# Patient Record
Sex: FEMALE | Race: WHITE | NOT HISPANIC OR LATINO | Employment: PART TIME | URBAN - METROPOLITAN AREA
[De-identification: names, ages, dates, MRNs, and addresses within clinical notes are randomized per-mention and may not be internally consistent; named-entity substitution may affect disease eponyms.]

---

## 2018-09-17 ENCOUNTER — TRANSCRIBE ORDERS (OUTPATIENT)
Dept: ADMINISTRATIVE | Facility: HOSPITAL | Age: 53
End: 2018-09-17

## 2018-09-17 ENCOUNTER — HOSPITAL ENCOUNTER (OUTPATIENT)
Dept: RADIOLOGY | Facility: HOSPITAL | Age: 53
Discharge: HOME | End: 2018-09-17
Payer: COMMERCIAL

## 2018-09-17 DIAGNOSIS — M54.2 NECK PAIN ON RIGHT SIDE: ICD-10-CM

## 2018-09-17 DIAGNOSIS — R07.89 STERNOCOSTAL PAIN: ICD-10-CM

## 2018-09-17 DIAGNOSIS — M54.2 NECK PAIN ON RIGHT SIDE: Primary | ICD-10-CM

## 2018-09-17 PROCEDURE — 71130 X-RAY STRENOCLAVIC JT 3/>VWS: CPT

## 2018-09-17 PROCEDURE — 72050 X-RAY EXAM NECK SPINE 4/5VWS: CPT

## 2018-09-21 ENCOUNTER — TRANSCRIBE ORDERS (OUTPATIENT)
Dept: ADMINISTRATIVE | Facility: HOSPITAL | Age: 53
End: 2018-09-21

## 2018-09-21 DIAGNOSIS — M89.8X1 CLAVICLE PAIN: Primary | ICD-10-CM

## 2018-09-27 ENCOUNTER — HOSPITAL ENCOUNTER (OUTPATIENT)
Dept: CT IMAGING | Facility: HOSPITAL | Age: 53
Discharge: HOME | End: 2018-09-27
Payer: COMMERCIAL

## 2018-09-27 DIAGNOSIS — M89.8X1 CLAVICLE PAIN: ICD-10-CM

## 2018-09-27 PROCEDURE — 73200 CT UPPER EXTREMITY W/O DYE: CPT

## 2019-05-27 ENCOUNTER — OFFICE VISIT (OUTPATIENT)
Dept: URGENT CARE | Facility: CLINIC | Age: 54
End: 2019-05-27
Payer: COMMERCIAL

## 2019-05-27 VITALS
HEIGHT: 64 IN | TEMPERATURE: 97.6 F | SYSTOLIC BLOOD PRESSURE: 153 MMHG | HEART RATE: 82 BPM | RESPIRATION RATE: 16 BRPM | DIASTOLIC BLOOD PRESSURE: 75 MMHG | WEIGHT: 155 LBS | OXYGEN SATURATION: 98 % | BODY MASS INDEX: 26.46 KG/M2

## 2019-05-27 DIAGNOSIS — M62.838 TRAPEZIUS MUSCLE SPASM: Primary | ICD-10-CM

## 2019-05-27 PROCEDURE — 99204 OFFICE O/P NEW MOD 45 MIN: CPT | Performed by: NURSE PRACTITIONER

## 2019-05-27 PROCEDURE — S9088 SERVICES PROVIDED IN URGENT: HCPCS | Performed by: NURSE PRACTITIONER

## 2019-05-27 RX ORDER — BACLOFEN 10 MG/1
10 TABLET ORAL 3 TIMES DAILY PRN
Qty: 30 TABLET | Refills: 0 | Status: SHIPPED | OUTPATIENT
Start: 2019-05-27 | End: 2022-07-12

## 2019-05-27 RX ORDER — LEVOTHYROXINE SODIUM 0.1 MG/1
TABLET ORAL
COMMUNITY
Start: 2011-11-23 | End: 2022-07-12

## 2019-05-27 RX ORDER — MELOXICAM 7.5 MG/1
7.5 TABLET ORAL 2 TIMES DAILY
COMMUNITY
End: 2022-07-12

## 2019-05-27 RX ORDER — DOXYCYCLINE HYCLATE 50 MG/1
324 CAPSULE, GELATIN COATED ORAL
COMMUNITY
End: 2022-07-12

## 2019-06-11 ENCOUNTER — EVALUATION (OUTPATIENT)
Dept: PHYSICAL THERAPY | Facility: CLINIC | Age: 54
End: 2019-06-11
Payer: COMMERCIAL

## 2019-06-11 VITALS — SYSTOLIC BLOOD PRESSURE: 134 MMHG | HEART RATE: 64 BPM | DIASTOLIC BLOOD PRESSURE: 87 MMHG

## 2019-06-11 DIAGNOSIS — R29.3 POSTURE ABNORMALITY: ICD-10-CM

## 2019-06-11 DIAGNOSIS — M62.838 TRAPEZIUS MUSCLE SPASM: Primary | ICD-10-CM

## 2019-06-11 PROCEDURE — 97162 PT EVAL MOD COMPLEX 30 MIN: CPT | Performed by: PHYSICAL THERAPIST

## 2019-06-11 PROCEDURE — 97535 SELF CARE MNGMENT TRAINING: CPT | Performed by: PHYSICAL THERAPIST

## 2019-06-14 ENCOUNTER — OFFICE VISIT (OUTPATIENT)
Dept: PHYSICAL THERAPY | Facility: CLINIC | Age: 54
End: 2019-06-14
Payer: COMMERCIAL

## 2019-06-14 DIAGNOSIS — M62.838 TRAPEZIUS MUSCLE SPASM: Primary | ICD-10-CM

## 2019-06-14 DIAGNOSIS — R29.3 POSTURE ABNORMALITY: ICD-10-CM

## 2019-06-14 PROCEDURE — 97110 THERAPEUTIC EXERCISES: CPT | Performed by: PHYSICAL THERAPIST

## 2019-06-14 PROCEDURE — 97140 MANUAL THERAPY 1/> REGIONS: CPT | Performed by: PHYSICAL THERAPIST

## 2019-06-17 ENCOUNTER — OFFICE VISIT (OUTPATIENT)
Dept: PHYSICAL THERAPY | Facility: CLINIC | Age: 54
End: 2019-06-17
Payer: COMMERCIAL

## 2019-06-17 DIAGNOSIS — M62.838 TRAPEZIUS MUSCLE SPASM: Primary | ICD-10-CM

## 2019-06-17 DIAGNOSIS — R29.3 POSTURE ABNORMALITY: ICD-10-CM

## 2019-06-17 PROCEDURE — 97110 THERAPEUTIC EXERCISES: CPT | Performed by: PHYSICAL THERAPIST

## 2019-06-17 PROCEDURE — 97140 MANUAL THERAPY 1/> REGIONS: CPT | Performed by: PHYSICAL THERAPIST

## 2019-06-19 ENCOUNTER — OFFICE VISIT (OUTPATIENT)
Dept: PHYSICAL THERAPY | Facility: CLINIC | Age: 54
End: 2019-06-19
Payer: COMMERCIAL

## 2019-06-19 DIAGNOSIS — M62.838 TRAPEZIUS MUSCLE SPASM: Primary | ICD-10-CM

## 2019-06-19 DIAGNOSIS — R29.3 POSTURE ABNORMALITY: ICD-10-CM

## 2019-06-19 PROCEDURE — 97110 THERAPEUTIC EXERCISES: CPT | Performed by: PHYSICAL THERAPIST

## 2019-06-19 PROCEDURE — 97140 MANUAL THERAPY 1/> REGIONS: CPT | Performed by: PHYSICAL THERAPIST

## 2019-06-24 ENCOUNTER — APPOINTMENT (OUTPATIENT)
Dept: PHYSICAL THERAPY | Facility: CLINIC | Age: 54
End: 2019-06-24
Payer: COMMERCIAL

## 2019-06-27 ENCOUNTER — OFFICE VISIT (OUTPATIENT)
Dept: PHYSICAL THERAPY | Facility: CLINIC | Age: 54
End: 2019-06-27
Payer: COMMERCIAL

## 2019-06-27 DIAGNOSIS — M62.838 TRAPEZIUS MUSCLE SPASM: Primary | ICD-10-CM

## 2019-06-27 DIAGNOSIS — R29.3 POSTURE ABNORMALITY: ICD-10-CM

## 2019-06-27 PROCEDURE — 97140 MANUAL THERAPY 1/> REGIONS: CPT | Performed by: PHYSICAL THERAPIST

## 2019-06-27 PROCEDURE — 97110 THERAPEUTIC EXERCISES: CPT | Performed by: PHYSICAL THERAPIST

## 2019-08-21 ENCOUNTER — APPOINTMENT (OUTPATIENT)
Dept: LAB | Facility: HOSPITAL | Age: 54
End: 2019-08-21
Payer: COMMERCIAL

## 2019-08-21 ENCOUNTER — TRANSCRIBE ORDERS (OUTPATIENT)
Dept: ADMINISTRATIVE | Facility: HOSPITAL | Age: 54
End: 2019-08-21

## 2019-08-21 DIAGNOSIS — Z98.84 S/P BARIATRIC SURGERY: ICD-10-CM

## 2019-08-21 DIAGNOSIS — E03.9 HYPOTHYROIDISM, UNSPECIFIED TYPE: Primary | ICD-10-CM

## 2019-08-21 DIAGNOSIS — E03.9 HYPOTHYROIDISM, UNSPECIFIED TYPE: ICD-10-CM

## 2019-08-21 DIAGNOSIS — Z98.84 S/P BARIATRIC SURGERY: Primary | ICD-10-CM

## 2019-08-21 LAB
25(OH)D3 SERPL-MCNC: 27.2 NG/ML (ref 30–100)
ALBUMIN SERPL BCP-MCNC: 4.2 G/DL (ref 3.5–5)
ALP SERPL-CCNC: 105 U/L (ref 46–116)
ALT SERPL W P-5'-P-CCNC: 40 U/L (ref 12–78)
ANION GAP SERPL CALCULATED.3IONS-SCNC: 7 MMOL/L (ref 4–13)
AST SERPL W P-5'-P-CCNC: 24 U/L (ref 5–45)
BASOPHILS # BLD AUTO: 0.05 THOUSANDS/ΜL (ref 0–0.1)
BASOPHILS NFR BLD AUTO: 1 % (ref 0–1)
BILIRUB SERPL-MCNC: 0.42 MG/DL (ref 0.2–1)
BUN SERPL-MCNC: 12 MG/DL (ref 5–25)
CALCIUM SERPL-MCNC: 9.5 MG/DL (ref 8.3–10.1)
CHLORIDE SERPL-SCNC: 108 MMOL/L (ref 100–108)
CHOLEST SERPL-MCNC: 251 MG/DL (ref 50–200)
CO2 SERPL-SCNC: 29 MMOL/L (ref 21–32)
CREAT SERPL-MCNC: 0.85 MG/DL (ref 0.6–1.3)
EOSINOPHIL # BLD AUTO: 0.48 THOUSAND/ΜL (ref 0–0.61)
EOSINOPHIL NFR BLD AUTO: 7 % (ref 0–6)
ERYTHROCYTE [DISTWIDTH] IN BLOOD BY AUTOMATED COUNT: 12.5 % (ref 11.6–15.1)
FERRITIN SERPL-MCNC: 692 NG/ML (ref 8–388)
FOLATE SERPL-MCNC: >20 NG/ML (ref 3.1–17.5)
GFR SERPL CREATININE-BSD FRML MDRD: 78 ML/MIN/1.73SQ M
GLUCOSE P FAST SERPL-MCNC: 90 MG/DL (ref 65–99)
HCT VFR BLD AUTO: 42.7 % (ref 34.8–46.1)
HDLC SERPL-MCNC: 55 MG/DL (ref 40–60)
HGB BLD-MCNC: 14.2 G/DL (ref 11.5–15.4)
IMM GRANULOCYTES # BLD AUTO: 0.02 THOUSAND/UL (ref 0–0.2)
IMM GRANULOCYTES NFR BLD AUTO: 0 % (ref 0–2)
IRON SERPL-MCNC: 96 UG/DL (ref 50–170)
LDLC SERPL CALC-MCNC: 162 MG/DL (ref 0–100)
LYMPHOCYTES # BLD AUTO: 2.42 THOUSANDS/ΜL (ref 0.6–4.47)
LYMPHOCYTES NFR BLD AUTO: 33 % (ref 14–44)
MCH RBC QN AUTO: 31.8 PG (ref 26.8–34.3)
MCHC RBC AUTO-ENTMCNC: 33.3 G/DL (ref 31.4–37.4)
MCV RBC AUTO: 96 FL (ref 82–98)
MONOCYTES # BLD AUTO: 0.44 THOUSAND/ΜL (ref 0.17–1.22)
MONOCYTES NFR BLD AUTO: 6 % (ref 4–12)
NEUTROPHILS # BLD AUTO: 3.84 THOUSANDS/ΜL (ref 1.85–7.62)
NEUTS SEG NFR BLD AUTO: 53 % (ref 43–75)
NONHDLC SERPL-MCNC: 196 MG/DL
NRBC BLD AUTO-RTO: 0 /100 WBCS
PLATELET # BLD AUTO: 266 THOUSANDS/UL (ref 149–390)
PMV BLD AUTO: 9.9 FL (ref 8.9–12.7)
POTASSIUM SERPL-SCNC: 4 MMOL/L (ref 3.5–5.3)
PROT SERPL-MCNC: 7.3 G/DL (ref 6.4–8.2)
PTH-INTACT SERPL-MCNC: 38 PG/ML (ref 18.4–80.1)
RBC # BLD AUTO: 4.46 MILLION/UL (ref 3.81–5.12)
SODIUM SERPL-SCNC: 144 MMOL/L (ref 136–145)
T4 FREE SERPL-MCNC: 1.16 NG/DL (ref 0.76–1.46)
TRIGL SERPL-MCNC: 169 MG/DL
TSH SERPL DL<=0.05 MIU/L-ACNC: 0.15 UIU/ML (ref 0.36–3.74)
VIT B12 SERPL-MCNC: 2268 PG/ML (ref 100–900)
WBC # BLD AUTO: 7.25 THOUSAND/UL (ref 4.31–10.16)

## 2019-08-21 PROCEDURE — 85025 COMPLETE CBC W/AUTO DIFF WBC: CPT

## 2019-08-21 PROCEDURE — 80053 COMPREHEN METABOLIC PANEL: CPT

## 2019-08-21 PROCEDURE — 84425 ASSAY OF VITAMIN B-1: CPT

## 2019-08-21 PROCEDURE — 84630 ASSAY OF ZINC: CPT

## 2019-08-21 PROCEDURE — 83970 ASSAY OF PARATHORMONE: CPT

## 2019-08-21 PROCEDURE — 84439 ASSAY OF FREE THYROXINE: CPT

## 2019-08-21 PROCEDURE — 84590 ASSAY OF VITAMIN A: CPT

## 2019-08-21 PROCEDURE — 82306 VITAMIN D 25 HYDROXY: CPT

## 2019-08-21 PROCEDURE — 36415 COLL VENOUS BLD VENIPUNCTURE: CPT

## 2019-08-21 PROCEDURE — 84443 ASSAY THYROID STIM HORMONE: CPT

## 2019-08-21 PROCEDURE — 83540 ASSAY OF IRON: CPT

## 2019-08-21 PROCEDURE — 80061 LIPID PANEL: CPT

## 2019-08-21 PROCEDURE — 82728 ASSAY OF FERRITIN: CPT

## 2019-08-21 PROCEDURE — 82607 VITAMIN B-12: CPT

## 2019-08-21 PROCEDURE — 82746 ASSAY OF FOLIC ACID SERUM: CPT

## 2019-08-24 LAB — ZINC SERPL-MCNC: 86 UG/DL (ref 56–134)

## 2019-08-25 LAB — VIT B1 BLD-SCNC: 195.3 NMOL/L (ref 66.5–200)

## 2019-08-26 LAB — VIT A SERPL-MCNC: 69.5 UG/DL (ref 20.1–62)

## 2019-09-26 ENCOUNTER — TRANSCRIBE ORDERS (OUTPATIENT)
Dept: ADMINISTRATIVE | Facility: HOSPITAL | Age: 54
End: 2019-09-26

## 2019-09-26 DIAGNOSIS — Z12.39 BREAST SCREENING, UNSPECIFIED: Primary | ICD-10-CM

## 2019-09-30 ENCOUNTER — HOSPITAL ENCOUNTER (OUTPATIENT)
Dept: MAMMOGRAPHY | Facility: HOSPITAL | Age: 54
Discharge: HOME | End: 2019-09-30
Attending: SPECIALIST
Payer: COMMERCIAL

## 2019-09-30 VITALS — BODY MASS INDEX: 27.31 KG/M2 | WEIGHT: 160 LBS | HEIGHT: 64 IN

## 2019-09-30 DIAGNOSIS — Z12.39 BREAST SCREENING, UNSPECIFIED: ICD-10-CM

## 2019-09-30 PROCEDURE — 77063 BREAST TOMOSYNTHESIS BI: CPT

## 2019-09-30 PROCEDURE — 77067 SCR MAMMO BI INCL CAD: CPT

## 2020-09-25 ENCOUNTER — TRANSCRIBE ORDERS (OUTPATIENT)
Dept: URGENT CARE | Facility: CLINIC | Age: 55
End: 2020-09-25

## 2020-09-25 ENCOUNTER — LAB (OUTPATIENT)
Dept: LAB | Facility: CLINIC | Age: 55
End: 2020-09-25
Payer: COMMERCIAL

## 2020-09-25 DIAGNOSIS — E03.9 HYPOTHYROIDISM, UNSPECIFIED TYPE: ICD-10-CM

## 2020-09-25 DIAGNOSIS — Z98.84 BARIATRIC SURGERY STATUS: ICD-10-CM

## 2020-09-25 DIAGNOSIS — Z98.84 BARIATRIC SURGERY STATUS: Primary | ICD-10-CM

## 2020-09-25 LAB
25(OH)D3 SERPL-MCNC: 30.2 NG/ML (ref 30–100)
ALBUMIN SERPL BCP-MCNC: 4.1 G/DL (ref 3.5–5)
ALP SERPL-CCNC: 108 U/L (ref 46–116)
ALT SERPL W P-5'-P-CCNC: 109 U/L (ref 12–78)
ANION GAP SERPL CALCULATED.3IONS-SCNC: 4 MMOL/L (ref 4–13)
AST SERPL W P-5'-P-CCNC: 60 U/L (ref 5–45)
BASOPHILS # BLD AUTO: 0.07 THOUSANDS/ΜL (ref 0–0.1)
BASOPHILS NFR BLD AUTO: 1 % (ref 0–1)
BILIRUB SERPL-MCNC: 0.38 MG/DL (ref 0.2–1)
BUN SERPL-MCNC: 13 MG/DL (ref 5–25)
CALCIUM SERPL-MCNC: 9.2 MG/DL (ref 8.3–10.1)
CHLORIDE SERPL-SCNC: 108 MMOL/L (ref 100–108)
CHOLEST SERPL-MCNC: 213 MG/DL (ref 50–200)
CO2 SERPL-SCNC: 28 MMOL/L (ref 21–32)
CREAT SERPL-MCNC: 0.91 MG/DL (ref 0.6–1.3)
EOSINOPHIL # BLD AUTO: 0.55 THOUSAND/ΜL (ref 0–0.61)
EOSINOPHIL NFR BLD AUTO: 8 % (ref 0–6)
ERYTHROCYTE [DISTWIDTH] IN BLOOD BY AUTOMATED COUNT: 12.4 % (ref 11.6–15.1)
FERRITIN SERPL-MCNC: 817 NG/ML (ref 8–388)
FOLATE SERPL-MCNC: >20 NG/ML (ref 3.1–17.5)
GFR SERPL CREATININE-BSD FRML MDRD: 72 ML/MIN/1.73SQ M
GLUCOSE P FAST SERPL-MCNC: 91 MG/DL (ref 65–99)
HCT VFR BLD AUTO: 42.6 % (ref 34.8–46.1)
HDLC SERPL-MCNC: 52 MG/DL
HGB BLD-MCNC: 14.1 G/DL (ref 11.5–15.4)
IMM GRANULOCYTES # BLD AUTO: 0.01 THOUSAND/UL (ref 0–0.2)
IMM GRANULOCYTES NFR BLD AUTO: 0 % (ref 0–2)
IRON SERPL-MCNC: 70 UG/DL (ref 50–170)
LDLC SERPL CALC-MCNC: 139 MG/DL (ref 0–100)
LYMPHOCYTES # BLD AUTO: 2 THOUSANDS/ΜL (ref 0.6–4.47)
LYMPHOCYTES NFR BLD AUTO: 30 % (ref 14–44)
MCH RBC QN AUTO: 31.9 PG (ref 26.8–34.3)
MCHC RBC AUTO-ENTMCNC: 33.1 G/DL (ref 31.4–37.4)
MCV RBC AUTO: 96 FL (ref 82–98)
MONOCYTES # BLD AUTO: 0.52 THOUSAND/ΜL (ref 0.17–1.22)
MONOCYTES NFR BLD AUTO: 8 % (ref 4–12)
NEUTROPHILS # BLD AUTO: 3.47 THOUSANDS/ΜL (ref 1.85–7.62)
NEUTS SEG NFR BLD AUTO: 53 % (ref 43–75)
NONHDLC SERPL-MCNC: 161 MG/DL
NRBC BLD AUTO-RTO: 0 /100 WBCS
PLATELET # BLD AUTO: 278 THOUSANDS/UL (ref 149–390)
PMV BLD AUTO: 9.5 FL (ref 8.9–12.7)
POTASSIUM SERPL-SCNC: 3.9 MMOL/L (ref 3.5–5.3)
PROT SERPL-MCNC: 7.4 G/DL (ref 6.4–8.2)
PTH-INTACT SERPL-MCNC: 79.9 PG/ML (ref 18.4–80.1)
RBC # BLD AUTO: 4.42 MILLION/UL (ref 3.81–5.12)
SODIUM SERPL-SCNC: 140 MMOL/L (ref 136–145)
T4 FREE SERPL-MCNC: 0.93 NG/DL (ref 0.76–1.46)
TRIGL SERPL-MCNC: 110 MG/DL
TSH SERPL DL<=0.05 MIU/L-ACNC: 2.14 UIU/ML (ref 0.36–3.74)
VIT B12 SERPL-MCNC: 440 PG/ML (ref 100–900)
WBC # BLD AUTO: 6.62 THOUSAND/UL (ref 4.31–10.16)

## 2020-09-25 PROCEDURE — 84443 ASSAY THYROID STIM HORMONE: CPT

## 2020-09-25 PROCEDURE — 85025 COMPLETE CBC W/AUTO DIFF WBC: CPT

## 2020-09-25 PROCEDURE — 36415 COLL VENOUS BLD VENIPUNCTURE: CPT

## 2020-09-25 PROCEDURE — 83970 ASSAY OF PARATHORMONE: CPT

## 2020-09-25 PROCEDURE — 80053 COMPREHEN METABOLIC PANEL: CPT

## 2020-09-25 PROCEDURE — 82746 ASSAY OF FOLIC ACID SERUM: CPT

## 2020-09-25 PROCEDURE — 84630 ASSAY OF ZINC: CPT

## 2020-09-25 PROCEDURE — 82728 ASSAY OF FERRITIN: CPT

## 2020-09-25 PROCEDURE — 84439 ASSAY OF FREE THYROXINE: CPT

## 2020-09-25 PROCEDURE — 84590 ASSAY OF VITAMIN A: CPT

## 2020-09-25 PROCEDURE — 80061 LIPID PANEL: CPT

## 2020-09-25 PROCEDURE — 83540 ASSAY OF IRON: CPT

## 2020-09-25 PROCEDURE — 82607 VITAMIN B-12: CPT

## 2020-09-25 PROCEDURE — 82306 VITAMIN D 25 HYDROXY: CPT

## 2020-09-25 PROCEDURE — 84425 ASSAY OF VITAMIN B-1: CPT

## 2020-09-28 LAB — VIT A SERPL-MCNC: 53.4 UG/DL (ref 20.1–62)

## 2020-09-29 LAB — ZINC SERPL-MCNC: 96 UG/DL (ref 56–134)

## 2020-09-30 LAB — VIT B1 BLD-SCNC: 121.7 NMOL/L (ref 66.5–200)

## 2022-04-05 ENCOUNTER — TELEPHONE (OUTPATIENT)
Dept: BARIATRICS | Facility: CLINIC | Age: 57
End: 2022-04-05

## 2022-04-05 NOTE — TELEPHONE ENCOUNTER
Transfer patient who had the RNY on 3/14/2003 with Dr Kenny Cornea  Do not need the operative note due to the surgery being performed more than 10 years ago  Still need patients PCP records  Gave patient our fax number  for her to have her records faxed over to us  Patient stated she will call us back to give us the name of where the surgery was performed  Placed paper work in the [address] [place] [address] under the letter A

## 2022-07-05 ENCOUNTER — TELEPHONE (OUTPATIENT)
Dept: GASTROENTEROLOGY | Facility: CLINIC | Age: 57
End: 2022-07-05

## 2022-07-05 DIAGNOSIS — E78.5 HYPERLIPIDEMIA, UNSPECIFIED HYPERLIPIDEMIA TYPE: Primary | ICD-10-CM

## 2022-07-05 DIAGNOSIS — E55.9 VITAMIN D DEFICIENCY: ICD-10-CM

## 2022-07-05 DIAGNOSIS — E03.9 HYPOTHYROIDISM, UNSPECIFIED TYPE: ICD-10-CM

## 2022-07-05 NOTE — TELEPHONE ENCOUNTER
Patient changing over from [facility]  Wants to know if you can order blood work for her for an upcoming appointment on 08/11/2022  Thank you

## 2022-07-12 ENCOUNTER — OFFICE VISIT (OUTPATIENT)
Dept: FAMILY MEDICINE CLINIC | Facility: CLINIC | Age: 57
End: 2022-07-12
Payer: COMMERCIAL

## 2022-07-12 VITALS
OXYGEN SATURATION: 98 % | HEART RATE: 88 BPM | HEIGHT: 64 IN | WEIGHT: 176.2 LBS | RESPIRATION RATE: 16 BRPM | DIASTOLIC BLOOD PRESSURE: 80 MMHG | BODY MASS INDEX: 30.08 KG/M2 | SYSTOLIC BLOOD PRESSURE: 136 MMHG | TEMPERATURE: 97.5 F

## 2022-07-12 DIAGNOSIS — Z78.0 ASYMPTOMATIC MENOPAUSE: ICD-10-CM

## 2022-07-12 DIAGNOSIS — J45.21 MILD INTERMITTENT ASTHMA WITH ACUTE EXACERBATION: ICD-10-CM

## 2022-07-12 DIAGNOSIS — Z12.12 ENCOUNTER FOR COLORECTAL CANCER SCREENING: ICD-10-CM

## 2022-07-12 DIAGNOSIS — E66.09 CLASS 1 OBESITY DUE TO EXCESS CALORIES WITH SERIOUS COMORBIDITY AND BODY MASS INDEX (BMI) OF 30.0 TO 30.9 IN ADULT: ICD-10-CM

## 2022-07-12 DIAGNOSIS — Z12.31 ENCOUNTER FOR SCREENING MAMMOGRAM FOR BREAST CANCER: ICD-10-CM

## 2022-07-12 DIAGNOSIS — Z12.11 ENCOUNTER FOR COLORECTAL CANCER SCREENING: ICD-10-CM

## 2022-07-12 DIAGNOSIS — E03.8 OTHER SPECIFIED HYPOTHYROIDISM: ICD-10-CM

## 2022-07-12 DIAGNOSIS — J20.8 ACUTE BRONCHITIS DUE TO OTHER SPECIFIED ORGANISMS: Primary | ICD-10-CM

## 2022-07-12 PROBLEM — E66.811 CLASS 1 OBESITY DUE TO EXCESS CALORIES WITH SERIOUS COMORBIDITY AND BODY MASS INDEX (BMI) OF 30.0 TO 30.9 IN ADULT: Status: ACTIVE | Noted: 2022-07-12

## 2022-07-12 PROBLEM — R06.83 SNORING: Status: ACTIVE | Noted: 2021-10-26

## 2022-07-12 PROBLEM — Z98.84 S/P BARIATRIC SURGERY: Status: ACTIVE | Noted: 2019-08-05

## 2022-07-12 PROBLEM — E55.9 VITAMIN D DEFICIENCY: Status: ACTIVE | Noted: 2017-01-06

## 2022-07-12 PROCEDURE — 99204 OFFICE O/P NEW MOD 45 MIN: CPT | Performed by: FAMILY MEDICINE

## 2022-07-12 PROCEDURE — 3725F SCREEN DEPRESSION PERFORMED: CPT | Performed by: FAMILY MEDICINE

## 2022-07-12 RX ORDER — ALBUTEROL SULFATE 90 UG/1
2 AEROSOL, METERED RESPIRATORY (INHALATION) EVERY 6 HOURS PRN
Qty: 18 G | Refills: 2 | Status: SHIPPED | OUTPATIENT
Start: 2022-07-12

## 2022-07-12 RX ORDER — AZITHROMYCIN 250 MG/1
TABLET, FILM COATED ORAL
Qty: 6 TABLET | Refills: 0 | Status: SHIPPED | OUTPATIENT
Start: 2022-07-12 | End: 2022-07-16

## 2022-07-12 RX ORDER — PREDNISONE 50 MG/1
50 TABLET ORAL DAILY
Qty: 5 TABLET | Refills: 0 | Status: SHIPPED | OUTPATIENT
Start: 2022-07-12 | End: 2022-07-17

## 2022-07-12 RX ORDER — LEVOTHYROXINE SODIUM 88 UG/1
TABLET ORAL
COMMUNITY
Start: 2022-05-09 | End: 2022-09-29 | Stop reason: ALTCHOICE

## 2022-07-12 RX ORDER — ESCITALOPRAM OXALATE 10 MG/1
10 TABLET ORAL DAILY
COMMUNITY
Start: 2022-05-31

## 2022-07-12 NOTE — PROGRESS NOTES
Assessment/Plan:  Hypothyroidism  Continue same  I will check her blood work and she is to follow up with endocrinologist     Acute bronchitis due to other specified organisms  She was given prescriptions for Z-Lizandro, prednisone and albuterol inhaler  Discussed about increasing oral hydration humidifier at home  If symptoms are not improving call or come back  Mild intermittent asthma with acute exacerbation  She was given prescriptions for prednisone and albuterol inhaler  She was told to come back in 3-4 weeks  I will consider checking chest x-ray and pulmonary function test when she is better  Class 1 obesity due to excess calories with serious comorbidity and body mass index (BMI) of 30 0 to 30 9 in adult  Discussed about low carb diet and regular exercise  Discussed about treatment options including medications  Discussed about phentermine  Come back in 3-4 weeks and get blood work before she comes back  Diagnoses and all orders for this visit:    Acute bronchitis due to other specified organisms  -     predniSONE 50 mg tablet; Take 1 tablet (50 mg total) by mouth daily for 5 days  -     azithromycin (ZITHROMAX) 250 mg tablet; Take 2 tablets today then 1 tablet daily x 4 days  -     albuterol (ProAir HFA) 90 mcg/act inhaler; Inhale 2 puffs every 6 (six) hours as needed for wheezing    Other specified hypothyroidism  -     CBC and differential; Future    Encounter for screening mammogram for breast cancer  -     Mammo screening bilateral w 3d & cad; Future    Encounter for colorectal cancer screening  -     Ambulatory Referral to General Surgery; Future    Asymptomatic menopause  -     DXA bone density spine hip and pelvis; Future    Mild intermittent asthma with acute exacerbation    BMI 30 0-30 9,adult    Class 1 obesity due to excess calories with serious comorbidity and body mass index (BMI) of 30 0 to 30 9 in adult  -     Insulin, fasting;  Future    Other orders  -     escitalopram (LEXAPRO) 10 mg tablet; Take 10 mg by mouth daily  -     levothyroxine 88 mcg tablet; take 1 tablet by mouth 6 DAYS PER WEEK        There are no Patient Instructions on file for this visit  Return in about 3 weeks (around 8/2/2022)  Subjective:      Patient ID: Román Dobbs is a 65 y o  female  Chief Complaint   Patient presents with   [facility]      Would like labs done    Cough     Coughing up green mucus and having some wheezing    Obesity    Diarrhea    Fatigue    Screening Colonoscopy     Did stool card through insurance       She is here today as a new patient to establish and with complaint of cough and wheezing and upper respiratory symptoms  She stated her symptoms been getting worse over the last week  Also complained of feeling fatigue and tired and weight gain  She follow-up with endocrinologist for hypothyroidism  She has history of gastric bypass more than 20 years ago  The following portions of the patient's history were reviewed and updated as appropriate: allergies, current medications, past family history, past medical history, past social history, past surgical history and problem list     Review of Systems   Constitutional: Positive for fatigue  Negative for activity change, chills and fever  HENT: Positive for congestion, postnasal drip, rhinorrhea and sinus pressure  Respiratory: Positive for cough and wheezing  Negative for apnea  Gastrointestinal: Negative for diarrhea and vomiting  Skin: Negative for rash  Neurological: Negative for dizziness           Current Outpatient Medications   Medication Sig Dispense Refill    albuterol (ProAir HFA) 90 mcg/act inhaler Inhale 2 puffs every 6 (six) hours as needed for wheezing 18 g 2    azithromycin (ZITHROMAX) 250 mg tablet Take 2 tablets today then 1 tablet daily x 4 days 6 tablet 0    escitalopram (LEXAPRO) 10 mg tablet Take 10 mg by mouth daily      levothyroxine 88 mcg tablet take 1 tablet by mouth 6 DAYS PER WEEK      predniSONE 50 mg tablet Take 1 tablet (50 mg total) by mouth daily for 5 days 5 tablet 0     No current facility-administered medications for this visit  Objective:    /80 (BP Location: Left arm, Patient Position: Sitting, Cuff Size: Standard)   Pulse 88   Temp 97 5 °F (36 4 °C) (Tympanic)   Resp 16   Ht 5' 4" (1 626 m)   Wt 79 9 kg (176 lb 3 2 oz)   SpO2 98%   BMI 30 24 kg/m²        Physical Exam  Vitals and nursing note reviewed  Constitutional:       Appearance: Normal appearance  She is well-developed  HENT:      Head: Normocephalic and atraumatic  Right Ear: A middle ear effusion is present  Left Ear: A middle ear effusion is present  Mouth/Throat:      Pharynx: Posterior oropharyngeal erythema present  Eyes:      Pupils: Pupils are equal, round, and reactive to light  Cardiovascular:      Rate and Rhythm: Normal rate and regular rhythm  Heart sounds: Normal heart sounds  Pulmonary:      Effort: Pulmonary effort is normal       Breath sounds: Wheezing present  Abdominal:      General: Bowel sounds are normal       Palpations: Abdomen is soft  Musculoskeletal:         General: Normal range of motion  Cervical back: Normal range of motion and neck supple  Lymphadenopathy:      Cervical: No cervical adenopathy  Skin:     General: Skin is warm and dry  Neurological:      Mental Status: She is alert and oriented to person, place, and time  Cranial Nerves: No cranial nerve deficit  John Weeks MD BMI Counseling: Body mass index is 30 24 kg/m²  The BMI is above normal  Nutrition recommendations include reducing portion sizes, decreasing overall calorie intake and 3-5 servings of fruits/vegetables daily  Exercise recommendations include moderate aerobic physical activity for 150 minutes/week

## 2022-07-12 NOTE — ASSESSMENT & PLAN NOTE
She was given prescriptions for prednisone and albuterol inhaler  She was told to come back in 3-4 weeks  I will consider checking chest x-ray and pulmonary function test when she is better

## 2022-07-12 NOTE — ASSESSMENT & PLAN NOTE
She was given prescriptions for Z-Lizandro, prednisone and albuterol inhaler  Discussed about increasing oral hydration humidifier at home  If symptoms are not improving call or come back

## 2022-07-12 NOTE — ASSESSMENT & PLAN NOTE
Discussed about low carb diet and regular exercise  Discussed about treatment options including medications  Discussed about phentermine  Come back in 3-4 weeks and get blood work before she comes back

## 2022-07-19 ENCOUNTER — TELEPHONE (OUTPATIENT)
Dept: FAMILY MEDICINE CLINIC | Facility: CLINIC | Age: 57
End: 2022-07-19

## 2022-07-19 ENCOUNTER — HOSPITAL ENCOUNTER (OUTPATIENT)
Dept: RADIOLOGY | Facility: HOSPITAL | Age: 57
Discharge: HOME | End: 2022-07-19
Attending: FAMILY MEDICINE

## 2022-07-19 ENCOUNTER — APPOINTMENT (OUTPATIENT)
Dept: RADIOLOGY | Facility: CLINIC | Age: 57
End: 2022-07-19
Payer: COMMERCIAL

## 2022-07-19 DIAGNOSIS — J20.8 ACUTE BRONCHITIS DUE TO OTHER SPECIFIED ORGANISMS: ICD-10-CM

## 2022-07-19 DIAGNOSIS — J20.8 ACUTE BRONCHITIS DUE TO OTHER SPECIFIED ORGANISMS: Primary | ICD-10-CM

## 2022-07-19 PROCEDURE — 71046 X-RAY EXAM CHEST 2 VIEWS: CPT

## 2022-07-19 RX ORDER — METHYLPREDNISOLONE 4 MG/1
TABLET ORAL
Qty: 21 EACH | Refills: 0 | Status: SHIPPED | OUTPATIENT
Start: 2022-07-19 | End: 2022-08-02 | Stop reason: ALTCHOICE

## 2022-07-19 NOTE — TELEPHONE ENCOUNTER
Patient was just seen and finished her meds on Saturday  Still has same symptoms, wheezing, cough and gurgling especially worse when lying down  Asking what to do

## 2022-07-19 NOTE — TELEPHONE ENCOUNTER
Discussed with Dr Lennie Blake , CXR, medrol dose pack, follow appt  Pt aware and agrees  appt sched

## 2022-07-22 ENCOUNTER — OFFICE VISIT (OUTPATIENT)
Dept: FAMILY MEDICINE CLINIC | Facility: CLINIC | Age: 57
End: 2022-07-22
Payer: COMMERCIAL

## 2022-07-22 ENCOUNTER — RA CDI HCC (OUTPATIENT)
Dept: OTHER | Facility: HOSPITAL | Age: 57
End: 2022-07-22

## 2022-07-22 VITALS
RESPIRATION RATE: 18 BRPM | TEMPERATURE: 98 F | HEART RATE: 84 BPM | WEIGHT: 174 LBS | DIASTOLIC BLOOD PRESSURE: 70 MMHG | SYSTOLIC BLOOD PRESSURE: 122 MMHG | HEIGHT: 64 IN | BODY MASS INDEX: 29.71 KG/M2 | OXYGEN SATURATION: 97 %

## 2022-07-22 DIAGNOSIS — J20.8 ACUTE BRONCHITIS DUE TO OTHER SPECIFIED ORGANISMS: ICD-10-CM

## 2022-07-22 DIAGNOSIS — J45.21 MILD INTERMITTENT ASTHMA WITH ACUTE EXACERBATION: Primary | ICD-10-CM

## 2022-07-22 PROCEDURE — 99214 OFFICE O/P EST MOD 30 MIN: CPT | Performed by: NURSE PRACTITIONER

## 2022-07-22 NOTE — PROGRESS NOTES
Assessment/Plan:    Mild intermittent asthma with acute exacerbation  - On day 3 of medrol dose pack  Still complains of wet nonproductive cough and rattling sensation in chest   - Chest x-ray was normal    - Recommended Singulair daily  - Will obtain PFTs and continue to follow up  Acute bronchitis due to other specified organisms  - Treated with z-marco, prednisone, and albuterol inhaler with no improvement  Given additional medrol dose pack  On day 3  Still complains of cough and wheezing    - Will send for PFTs and continue to follow up  - Continue use of albuterol as needed  Diagnoses and all orders for this visit:    Mild intermittent asthma with acute exacerbation  -     Complete PFT with post bronchodilator; Future    Acute bronchitis due to other specified organisms        Subjective:      Patient ID: Anel Kaplan is a 65 y o  female  Patient presents today for follow up appointment  She was treated on 7/12 for acute bronchitis and given a z-marco, prednisone, and albuterol inhaler  She was not feeling better after 7 days of treatment  She was given a medrol dose pack which she is on day 3 of  She had a CXR done which was normal  She still complains of a wet nonproductive cough and wheezing  She does have a history of asthma but it has been uncomplicated for her adult life  She did get COVID twice, [place] this illness is worse  She has had symptoms for about 3 weeks  Denies any fever or chills  The following portions of the patient's history were reviewed and updated as appropriate: allergies, current medications, past family history, past medical history, past social history, past surgical history and problem list     Review of Systems   Constitutional: Negative for chills, fatigue and fever  HENT: Negative for sinus pressure, sinus pain and trouble swallowing  Eyes: Negative for visual disturbance     Respiratory: Positive for cough (nonproductive) and wheezing  Negative for shortness of breath  Cardiovascular: Negative for chest pain and palpitations  Gastrointestinal: Negative for abdominal pain and blood in stool  Endocrine: Negative for cold intolerance and heat intolerance  Genitourinary: Negative for difficulty urinating and dysuria  Musculoskeletal: Negative for gait problem  Skin: Negative for rash  Neurological: Negative for dizziness, syncope and headaches  Hematological: Negative for adenopathy  Psychiatric/Behavioral: Negative for behavioral problems  Objective:      /70 (BP Location: Left arm, Patient Position: Sitting, Cuff Size: Large)   Pulse 84   Temp 98 °F (36 7 °C) (Tympanic)   Resp 18   Ht 5' 4" (1 626 m)   Wt 78 9 kg (174 lb)   SpO2 97%   BMI 29 87 kg/m²          Physical Exam  Vitals and nursing note reviewed  Constitutional:       Appearance: Normal appearance  HENT:      Head: Normocephalic and atraumatic  Right Ear: External ear normal       Left Ear: External ear normal    Eyes:      Conjunctiva/sclera: Conjunctivae normal    Cardiovascular:      Rate and Rhythm: Normal rate and regular rhythm  Heart sounds: Normal heart sounds  Pulmonary:      Effort: Pulmonary effort is normal  No respiratory distress  Breath sounds: Rhonchi (clears with cough) present  Musculoskeletal:         General: Normal range of motion  Cervical back: Normal range of motion  Skin:     General: Skin is warm and dry  Neurological:      Mental Status: She is alert and oriented to person, place, and time  Cranial Nerves: No cranial nerve deficit     Psychiatric:         Mood and Affect: Mood normal          Behavior: Behavior normal

## 2022-07-22 NOTE — ASSESSMENT & PLAN NOTE
- Treated with z-marco, prednisone, and albuterol inhaler with no improvement  Given additional medrol dose pack  On day 3  Still complains of cough and wheezing    - Will send for PFTs and continue to follow up  - Continue use of albuterol as needed

## 2022-07-22 NOTE — PROGRESS NOTES
[facility]  coding opportunities       Chart reviewed, no opportunity found: CHART REVIEWED, NO OPPORTUNITY FOUND        Patients Insurance        Commercial Insurance: [employer]

## 2022-07-22 NOTE — ASSESSMENT & PLAN NOTE
- On day 3 of medrol dose pack  Still complains of wet nonproductive cough and rattling sensation in chest   - Chest x-ray was normal    - Recommended Singulair daily  - Will obtain PFTs and continue to follow up  normal...

## 2022-08-01 ENCOUNTER — APPOINTMENT (OUTPATIENT)
Dept: LAB | Facility: CLINIC | Age: 57
End: 2022-08-01
Payer: COMMERCIAL

## 2022-08-01 DIAGNOSIS — E03.9 HYPOTHYROIDISM, UNSPECIFIED TYPE: ICD-10-CM

## 2022-08-01 DIAGNOSIS — E55.9 VITAMIN D DEFICIENCY: ICD-10-CM

## 2022-08-01 DIAGNOSIS — E03.8 OTHER SPECIFIED HYPOTHYROIDISM: ICD-10-CM

## 2022-08-01 DIAGNOSIS — E78.5 HYPERLIPIDEMIA, UNSPECIFIED HYPERLIPIDEMIA TYPE: ICD-10-CM

## 2022-08-01 DIAGNOSIS — E66.09 CLASS 1 OBESITY DUE TO EXCESS CALORIES WITH SERIOUS COMORBIDITY AND BODY MASS INDEX (BMI) OF 30.0 TO 30.9 IN ADULT: ICD-10-CM

## 2022-08-01 LAB
ALBUMIN SERPL BCP-MCNC: 3.4 G/DL (ref 3.5–5)
ALP SERPL-CCNC: 95 U/L (ref 46–116)
ALT SERPL W P-5'-P-CCNC: 35 U/L (ref 12–78)
ANION GAP SERPL CALCULATED.3IONS-SCNC: 5 MMOL/L (ref 4–13)
AST SERPL W P-5'-P-CCNC: 23 U/L (ref 5–45)
BASOPHILS # BLD AUTO: 0.03 THOUSANDS/ΜL (ref 0–0.1)
BASOPHILS NFR BLD AUTO: 0 % (ref 0–1)
BILIRUB SERPL-MCNC: 0.42 MG/DL (ref 0.2–1)
BUN SERPL-MCNC: 12 MG/DL (ref 5–25)
CALCIUM ALBUM COR SERPL-MCNC: 9.7 MG/DL (ref 8.3–10.1)
CALCIUM SERPL-MCNC: 9.2 MG/DL (ref 8.3–10.1)
CHLORIDE SERPL-SCNC: 110 MMOL/L (ref 96–108)
CHOLEST SERPL-MCNC: 247 MG/DL
CO2 SERPL-SCNC: 26 MMOL/L (ref 21–32)
CREAT SERPL-MCNC: 0.79 MG/DL (ref 0.6–1.3)
EOSINOPHIL # BLD AUTO: 0.55 THOUSAND/ΜL (ref 0–0.61)
EOSINOPHIL NFR BLD AUTO: 8 % (ref 0–6)
ERYTHROCYTE [DISTWIDTH] IN BLOOD BY AUTOMATED COUNT: 13 % (ref 11.6–15.1)
GFR SERPL CREATININE-BSD FRML MDRD: 83 ML/MIN/1.73SQ M
GLUCOSE P FAST SERPL-MCNC: 83 MG/DL (ref 65–99)
HCT VFR BLD AUTO: 41.4 % (ref 34.8–46.1)
HDLC SERPL-MCNC: 49 MG/DL
HGB BLD-MCNC: 13.4 G/DL (ref 11.5–15.4)
IMM GRANULOCYTES # BLD AUTO: 0.01 THOUSAND/UL (ref 0–0.2)
IMM GRANULOCYTES NFR BLD AUTO: 0 % (ref 0–2)
LDLC SERPL CALC-MCNC: 152 MG/DL (ref 0–100)
LYMPHOCYTES # BLD AUTO: 2.15 THOUSANDS/ΜL (ref 0.6–4.47)
LYMPHOCYTES NFR BLD AUTO: 32 % (ref 14–44)
MCH RBC QN AUTO: 32 PG (ref 26.8–34.3)
MCHC RBC AUTO-ENTMCNC: 32.4 G/DL (ref 31.4–37.4)
MCV RBC AUTO: 99 FL (ref 82–98)
MONOCYTES # BLD AUTO: 0.57 THOUSAND/ΜL (ref 0.17–1.22)
MONOCYTES NFR BLD AUTO: 8 % (ref 4–12)
NEUTROPHILS # BLD AUTO: 3.5 THOUSANDS/ΜL (ref 1.85–7.62)
NEUTS SEG NFR BLD AUTO: 52 % (ref 43–75)
NONHDLC SERPL-MCNC: 198 MG/DL
NRBC BLD AUTO-RTO: 0 /100 WBCS
PLATELET # BLD AUTO: 263 THOUSANDS/UL (ref 149–390)
PMV BLD AUTO: 9.9 FL (ref 8.9–12.7)
POTASSIUM SERPL-SCNC: 3.4 MMOL/L (ref 3.5–5.3)
PROT SERPL-MCNC: 6.7 G/DL (ref 6.4–8.4)
RBC # BLD AUTO: 4.19 MILLION/UL (ref 3.81–5.12)
SODIUM SERPL-SCNC: 141 MMOL/L (ref 135–147)
T4 FREE SERPL-MCNC: 1.02 NG/DL (ref 0.76–1.46)
TRIGL SERPL-MCNC: 228 MG/DL
TSH SERPL DL<=0.05 MIU/L-ACNC: 0.63 UIU/ML (ref 0.45–4.5)
WBC # BLD AUTO: 6.81 THOUSAND/UL (ref 4.31–10.16)

## 2022-08-01 PROCEDURE — 82306 VITAMIN D 25 HYDROXY: CPT

## 2022-08-01 PROCEDURE — 84439 ASSAY OF FREE THYROXINE: CPT

## 2022-08-01 PROCEDURE — 80053 COMPREHEN METABOLIC PANEL: CPT

## 2022-08-01 PROCEDURE — 36415 COLL VENOUS BLD VENIPUNCTURE: CPT

## 2022-08-01 PROCEDURE — 80061 LIPID PANEL: CPT

## 2022-08-01 PROCEDURE — 85025 COMPLETE CBC W/AUTO DIFF WBC: CPT

## 2022-08-01 PROCEDURE — 84443 ASSAY THYROID STIM HORMONE: CPT

## 2022-08-01 PROCEDURE — 83525 ASSAY OF INSULIN: CPT

## 2022-08-02 ENCOUNTER — OFFICE VISIT (OUTPATIENT)
Dept: FAMILY MEDICINE CLINIC | Facility: CLINIC | Age: 57
End: 2022-08-02
Payer: COMMERCIAL

## 2022-08-02 VITALS
TEMPERATURE: 98.7 F | BODY MASS INDEX: 30.22 KG/M2 | SYSTOLIC BLOOD PRESSURE: 122 MMHG | HEIGHT: 64 IN | DIASTOLIC BLOOD PRESSURE: 72 MMHG | HEART RATE: 73 BPM | WEIGHT: 177 LBS | OXYGEN SATURATION: 97 % | RESPIRATION RATE: 14 BRPM

## 2022-08-02 DIAGNOSIS — E66.09 CLASS 1 OBESITY DUE TO EXCESS CALORIES WITH SERIOUS COMORBIDITY AND BODY MASS INDEX (BMI) OF 30.0 TO 30.9 IN ADULT: ICD-10-CM

## 2022-08-02 DIAGNOSIS — J45.21 MILD INTERMITTENT ASTHMA WITH ACUTE EXACERBATION: ICD-10-CM

## 2022-08-02 DIAGNOSIS — E78.49 OTHER HYPERLIPIDEMIA: Primary | ICD-10-CM

## 2022-08-02 LAB
25(OH)D3 SERPL-MCNC: 45.3 NG/ML (ref 30–100)
INSULIN SERPL-ACNC: 6.1 MU/L (ref 3–25)

## 2022-08-02 PROCEDURE — 99214 OFFICE O/P EST MOD 30 MIN: CPT | Performed by: FAMILY MEDICINE

## 2022-08-02 RX ORDER — PHENTERMINE HYDROCHLORIDE 37.5 MG/1
37.5 CAPSULE ORAL EVERY MORNING
Qty: 30 CAPSULE | Refills: 0 | Status: SHIPPED | OUTPATIENT
Start: 2022-08-02 | End: 2022-09-06 | Stop reason: SDUPTHER

## 2022-08-02 RX ORDER — ROSUVASTATIN CALCIUM 10 MG/1
10 TABLET, COATED ORAL DAILY
Qty: 90 TABLET | Refills: 3 | Status: SHIPPED | OUTPATIENT
Start: 2022-08-02

## 2022-08-02 NOTE — ASSESSMENT & PLAN NOTE
Not well controlled  I am going to start her on Crestor 10 mg 1 daily  Discussed about possible side effects  Discussed about low-fat diet and regular exercise  I will repeat blood work in 3 months

## 2022-08-02 NOTE — ASSESSMENT & PLAN NOTE
Not well controlled  Discussed about low carb diet and regular exercise  I am going to start her on phentermine  It was discussed about possible side effect of the medication  Come back in 1 month for follow-up

## 2022-08-02 NOTE — PROGRESS NOTES
Assessment/Plan:  Mild intermittent asthma with acute exacerbation    Improved  Will continue to monitor  Class 1 obesity due to excess calories with serious comorbidity and body mass index (BMI) of 30 0 to 30 9 in adult    Not well controlled  Discussed about low carb diet and regular exercise  I am going to start her on phentermine  It was discussed about possible side effect of the medication  Come back in 1 month for follow-up  Other hyperlipidemia    Not well controlled  I am going to start her on Crestor 10 mg 1 daily  Discussed about possible side effects  Discussed about low-fat diet and regular exercise  I will repeat blood work in 3 months  Diagnoses and all orders for this visit:    Other hyperlipidemia  -     rosuvastatin (CRESTOR) 10 MG tablet; Take 1 tablet (10 mg total) by mouth daily    Class 1 obesity due to excess calories with serious comorbidity and body mass index (BMI) of 30 0 to 30 9 in adult  -     phentermine 37 5 MG capsule; Take 1 capsule (37 5 mg total) by mouth every morning    Mild intermittent asthma with acute exacerbation        There are no Patient Instructions on file for this visit  Return in about 1 month (around 9/2/2022)  Subjective:      Patient ID: Geeta Dyer is a 65 y o  female  Chief Complaint   Patient presents with    Follow-up         She is here today for follow-up multiple medical problems  She has been taking her medications  Denies any side effects  She is doing well on Lexapro  She finished her prednisone course on her breathing has been better  Her wheezing improved  She had blood work done recently showed elevated cholesterol  She has significant family  Cardiac medical history for coronary artery disease  She has been trying to diet but has not been exercising  She has not been successful losing weight and she is requesting medication to help with weight loss        The following portions of the patient's history were reviewed and updated as appropriate: allergies, current medications, past family history, past medical history, past social history, past surgical history and problem list     Review of Systems   Constitutional: Negative for chills and fever  HENT: Negative for trouble swallowing  Eyes: Negative for visual disturbance  Respiratory: Negative for cough and shortness of breath  Cardiovascular: Negative for chest pain, palpitations and leg swelling  Gastrointestinal: Negative for abdominal pain, constipation and diarrhea  Endocrine: Negative for cold intolerance and heat intolerance  Genitourinary: Negative for difficulty urinating and dysuria  Musculoskeletal: Negative for gait problem  Skin: Negative for rash  Neurological: Negative for dizziness, tremors, seizures and headaches  Hematological: Negative for adenopathy  Psychiatric/Behavioral: Negative for behavioral problems  Current Outpatient Medications   Medication Sig Dispense Refill    albuterol (ProAir HFA) 90 mcg/act inhaler Inhale 2 puffs every 6 (six) hours as needed for wheezing 18 g 2    escitalopram (LEXAPRO) 10 mg tablet Take 10 mg by mouth daily      levothyroxine 88 mcg tablet take 1 tablet by mouth 6 DAYS PER WEEK      phentermine 37 5 MG capsule Take 1 capsule (37 5 mg total) by mouth every morning 30 capsule 0    rosuvastatin (CRESTOR) 10 MG tablet Take 1 tablet (10 mg total) by mouth daily 90 tablet 3     No current facility-administered medications for this visit  Objective:    /72 (BP Location: Left arm, Patient Position: Sitting, Cuff Size: Large)   Pulse 73   Temp 98 7 °F (37 1 °C) (Tympanic)   Resp 14   Ht 5' 4" (1 626 m)   Wt 80 3 kg (177 lb)   SpO2 97%   BMI 30 38 kg/m²        Physical Exam  Vitals and nursing note reviewed  Constitutional:       Appearance: She is well-developed  HENT:      Head: Normocephalic and atraumatic     Eyes:      Pupils: Pupils are equal, round, and reactive to light  Cardiovascular:      Rate and Rhythm: Normal rate and regular rhythm  Heart sounds: Normal heart sounds  Pulmonary:      Effort: Pulmonary effort is normal       Breath sounds: Normal breath sounds  Abdominal:      General: Bowel sounds are normal       Palpations: Abdomen is soft  Musculoskeletal:         General: Normal range of motion  Cervical back: Normal range of motion and neck supple  Lymphadenopathy:      Cervical: No cervical adenopathy  Skin:     General: Skin is warm  Neurological:      Mental Status: She is alert and oriented to person, place, and time  Cranial Nerves: No cranial nerve deficit                  Henrene Libman, MD

## 2022-08-11 ENCOUNTER — TELEPHONE (OUTPATIENT)
Dept: OTHER | Facility: OTHER | Age: 57
End: 2022-08-11

## 2022-08-11 NOTE — TELEPHONE ENCOUNTER
Patient is calling regarding cancelling an appointment  Date/Time: 8/11/22 09:00     Patient was rescheduled: YES [] NO [x]    Patient requesting call back to reschedule: YES [x] NO []    Patient is sick and needs to reschedule

## 2022-08-12 ENCOUNTER — TELEPHONE (OUTPATIENT)
Dept: FAMILY MEDICINE CLINIC | Facility: CLINIC | Age: 57
End: 2022-08-12

## 2022-08-12 DIAGNOSIS — R05.9 COUGH: Primary | ICD-10-CM

## 2022-08-12 RX ORDER — AZITHROMYCIN 250 MG/1
TABLET, FILM COATED ORAL
Qty: 6 TABLET | Refills: 0 | Status: SHIPPED | OUTPATIENT
Start: 2022-08-12 | End: 2022-08-16

## 2022-08-12 RX ORDER — PREDNISONE 50 MG/1
50 TABLET ORAL DAILY
Qty: 5 TABLET | Refills: 0 | Status: SHIPPED | OUTPATIENT
Start: 2022-08-12 | End: 2022-09-06 | Stop reason: ALTCHOICE

## 2022-08-12 NOTE — TELEPHONE ENCOUNTER
----- Message from [address] Cleaves sent at 8/11/2022  8:55 AM EDT -----  Regarding: Follow up/Sick again  Good morning,  Im having the same issues I did with chest congestion, wheezing and coughing  I was good for about a day after my last visit with Dr Thresa Peabody, but its back again  Not sure if its viral, bacterial or just got aggravated by allergies(pollen etc)

## 2022-08-12 NOTE — TELEPHONE ENCOUNTER
Pt c/o chest congestion , wheezing, negative home test  No head congestion  Son who is a PT was sent home from work same symptoms, grandson positive for RSV  Pt has been using Albuterol, Claritin  Per Alie Valverde, Z pack , prednisone 50mg x5d, increase oral hydration, humidifier  ER for SOB  Pt aware and agrees

## 2022-08-17 ENCOUNTER — HOSPITAL ENCOUNTER (OUTPATIENT)
Dept: BONE DENSITY | Facility: HOSPITAL | Age: 57
Discharge: HOME | End: 2022-08-17
Payer: COMMERCIAL

## 2022-08-17 DIAGNOSIS — Z78.0 ASYMPTOMATIC MENOPAUSE: ICD-10-CM

## 2022-08-17 PROCEDURE — 77080 DXA BONE DENSITY AXIAL: CPT

## 2022-08-31 ENCOUNTER — TELEPHONE (OUTPATIENT)
Dept: FAMILY MEDICINE CLINIC | Facility: CLINIC | Age: 57
End: 2022-08-31

## 2022-08-31 NOTE — TELEPHONE ENCOUNTER
----- Message from Davi Jacobson MD sent at 8/31/2022  6:55 AM EDT -----  DEXA scan showed osteopenia  Recommend vitamin-D with calcium

## 2022-09-06 ENCOUNTER — OFFICE VISIT (OUTPATIENT)
Dept: FAMILY MEDICINE CLINIC | Facility: CLINIC | Age: 57
End: 2022-09-06
Payer: COMMERCIAL

## 2022-09-06 VITALS
DIASTOLIC BLOOD PRESSURE: 76 MMHG | WEIGHT: 172 LBS | HEART RATE: 75 BPM | BODY MASS INDEX: 29.37 KG/M2 | OXYGEN SATURATION: 93 % | TEMPERATURE: 97.3 F | HEIGHT: 64 IN | SYSTOLIC BLOOD PRESSURE: 128 MMHG | RESPIRATION RATE: 16 BRPM

## 2022-09-06 DIAGNOSIS — J45.30 MILD PERSISTENT ASTHMA WITHOUT COMPLICATION: Primary | ICD-10-CM

## 2022-09-06 DIAGNOSIS — E03.8 OTHER SPECIFIED HYPOTHYROIDISM: ICD-10-CM

## 2022-09-06 DIAGNOSIS — E66.09 CLASS 1 OBESITY DUE TO EXCESS CALORIES WITH SERIOUS COMORBIDITY AND BODY MASS INDEX (BMI) OF 30.0 TO 30.9 IN ADULT: ICD-10-CM

## 2022-09-06 PROCEDURE — 99214 OFFICE O/P EST MOD 30 MIN: CPT | Performed by: FAMILY MEDICINE

## 2022-09-06 RX ORDER — PHENTERMINE HYDROCHLORIDE 37.5 MG/1
37.5 CAPSULE ORAL EVERY MORNING
Qty: 30 CAPSULE | Refills: 0 | Status: SHIPPED | OUTPATIENT
Start: 2022-09-06 | End: 2022-10-04 | Stop reason: SDUPTHER

## 2022-09-06 RX ORDER — BUDESONIDE 90 UG/1
1 AEROSOL, POWDER RESPIRATORY (INHALATION) 2 TIMES DAILY
Qty: 1 EACH | Refills: 2 | Status: SHIPPED | OUTPATIENT
Start: 2022-09-06 | End: 2022-10-10

## 2022-09-06 NOTE — ASSESSMENT & PLAN NOTE
She was given prescription for Pulmicort inhaler  She has an appointment for pulmonary function test scheduled    Her chest x-ray came back normal   I am going to refer to see pulmonologist

## 2022-09-06 NOTE — ASSESSMENT & PLAN NOTE
Improving  Continue on phentermine  Discussed about low carb diet and regular exercise  Continue to monitor  Come back in 1 month

## 2022-09-06 NOTE — PROGRESS NOTES
Assessment/Plan:  Mild persistent asthma without complication  She was given prescription for Pulmicort inhaler  She has an appointment for pulmonary function test scheduled  Her chest x-ray came back normal   I am going to refer to see pulmonologist     Hypothyroidism  Well controlled  Continue same  Will continue to monitor  BMI 29 0-29 9,adult  Improving  Continue on phentermine  Discussed about low carb diet and regular exercise  Continue to monitor  Come back in 1 month  Diagnoses and all orders for this visit:    Mild persistent asthma without complication  -     Ambulatory Referral to Pulmonology; Future  -     budesonide (Pulmicort Flexhaler) 90 MCG/ACT inhaler; Inhale 1 puff 2 (two) times a day Rinse mouth after use  Class 1 obesity due to excess calories with serious comorbidity and body mass index (BMI) of 30 0 to 30 9 in adult  -     phentermine 37 5 MG capsule; Take 1 capsule (37 5 mg total) by mouth every morning    Other specified hypothyroidism    BMI 29 0-29 9,adult        There are no Patient Instructions on file for this visit  Return in about 1 month (around 10/6/2022)  Subjective:      Patient ID: Star Nguyen is a 65 y o  female  Chief Complaint   Patient presents with    Obesity     Follow up     Cough       She is here today for follow-up for weight management and for asthma with acute exacerbation  Her symptoms improved after she took the prednisone but she continues to cough and wheezes mostly at nighttime  Denies any shortness of breath  She has been taking phentermine and she lost 5 lb since the last visit  She denies any side effects from the medication  The following portions of the patient's history were reviewed and updated as appropriate: allergies, current medications, past family history, past medical history, past social history, past surgical history and problem list     Review of Systems   Constitutional: Negative for chills and fever  HENT: Negative for trouble swallowing  Eyes: Negative for visual disturbance  Respiratory: Positive for wheezing  Negative for cough and shortness of breath  Cardiovascular: Negative for chest pain, palpitations and leg swelling  Gastrointestinal: Negative for abdominal pain, constipation and diarrhea  Endocrine: Negative for cold intolerance and heat intolerance  Genitourinary: Negative for difficulty urinating and dysuria  Musculoskeletal: Negative for gait problem  Skin: Negative for rash  Neurological: Negative for dizziness, tremors, seizures and headaches  Hematological: Negative for adenopathy  Psychiatric/Behavioral: Negative for behavioral problems  Current Outpatient Medications   Medication Sig Dispense Refill    albuterol (ProAir HFA) 90 mcg/act inhaler Inhale 2 puffs every 6 (six) hours as needed for wheezing 18 g 2    budesonide (Pulmicort Flexhaler) 90 MCG/ACT inhaler Inhale 1 puff 2 (two) times a day Rinse mouth after use  1 each 2    escitalopram (LEXAPRO) 10 mg tablet Take 10 mg by mouth daily      levothyroxine 88 mcg tablet take 1 tablet by mouth 6 DAYS PER WEEK      phentermine 37 5 MG capsule Take 1 capsule (37 5 mg total) by mouth every morning 30 capsule 0    rosuvastatin (CRESTOR) 10 MG tablet Take 1 tablet (10 mg total) by mouth daily 90 tablet 3     No current facility-administered medications for this visit  Objective:    /76 (BP Location: Right arm, Patient Position: Sitting, Cuff Size: Adult)   Pulse 75   Temp (!) 97 3 °F (36 3 °C) (Tympanic)   Resp 16   Ht 5' 4" (1 626 m)   Wt 78 kg (172 lb)   SpO2 93%   BMI 29 52 kg/m²        Physical Exam  Vitals and nursing note reviewed  Constitutional:       Appearance: Normal appearance  She is well-developed  HENT:      Head: Normocephalic and atraumatic  Eyes:      Pupils: Pupils are equal, round, and reactive to light     Cardiovascular:      Rate and Rhythm: Normal rate and regular rhythm  Heart sounds: Normal heart sounds  Pulmonary:      Effort: Pulmonary effort is normal       Breath sounds: Wheezing present  Abdominal:      General: Bowel sounds are normal       Palpations: Abdomen is soft  Musculoskeletal:         General: Normal range of motion  Cervical back: Normal range of motion and neck supple  Lymphadenopathy:      Cervical: No cervical adenopathy  Skin:     General: Skin is warm  Neurological:      Mental Status: She is alert and oriented to person, place, and time  Cranial Nerves: No cranial nerve deficit                  Mae Ge MD

## 2022-09-09 ENCOUNTER — HOSPITAL ENCOUNTER (OUTPATIENT)
Dept: PULMONOLOGY | Facility: HOSPITAL | Age: 57
End: 2022-09-09
Payer: COMMERCIAL

## 2022-09-09 DIAGNOSIS — J45.21 MILD INTERMITTENT ASTHMA WITH ACUTE EXACERBATION: ICD-10-CM

## 2022-09-09 PROCEDURE — 94729 DIFFUSING CAPACITY: CPT

## 2022-09-09 PROCEDURE — 94760 N-INVAS EAR/PLS OXIMETRY 1: CPT

## 2022-09-09 PROCEDURE — 94060 EVALUATION OF WHEEZING: CPT | Performed by: INTERNAL MEDICINE

## 2022-09-09 PROCEDURE — 94726 PLETHYSMOGRAPHY LUNG VOLUMES: CPT

## 2022-09-09 PROCEDURE — 94726 PLETHYSMOGRAPHY LUNG VOLUMES: CPT | Performed by: INTERNAL MEDICINE

## 2022-09-09 PROCEDURE — 94729 DIFFUSING CAPACITY: CPT | Performed by: INTERNAL MEDICINE

## 2022-09-09 PROCEDURE — 94060 EVALUATION OF WHEEZING: CPT

## 2022-09-09 RX ORDER — ALBUTEROL SULFATE 2.5 MG/3ML
2.5 SOLUTION RESPIRATORY (INHALATION) ONCE AS NEEDED
Status: COMPLETED | OUTPATIENT
Start: 2022-09-09 | End: 2022-09-09

## 2022-09-09 RX ADMIN — ALBUTEROL SULFATE 2.5 MG: 2.5 SOLUTION RESPIRATORY (INHALATION) at 08:29

## 2022-09-12 ENCOUNTER — TELEPHONE (OUTPATIENT)
Dept: PULMONOLOGY | Facility: CLINIC | Age: 57
End: 2022-09-12

## 2022-09-13 ENCOUNTER — TELEPHONE (OUTPATIENT)
Dept: FAMILY MEDICINE CLINIC | Facility: CLINIC | Age: 57
End: 2022-09-13

## 2022-09-13 NOTE — TELEPHONE ENCOUNTER
Lm for pt to check her my chart for her results and a ref was placed for pulmonology  She is to call with any questions

## 2022-09-13 NOTE — TELEPHONE ENCOUNTER
----- Message from Gabriel Burnett, [address] sent at 9/13/2022  8:41 AM EDT -----  Pulmonary function test was normal  Please schedule appt to see Pulmonologist

## 2022-09-29 ENCOUNTER — OFFICE VISIT (OUTPATIENT)
Dept: ENDOCRINOLOGY | Facility: CLINIC | Age: 57
End: 2022-09-29
Payer: COMMERCIAL

## 2022-09-29 VITALS
BODY MASS INDEX: 29.06 KG/M2 | WEIGHT: 170.2 LBS | HEIGHT: 64 IN | HEART RATE: 70 BPM | SYSTOLIC BLOOD PRESSURE: 130 MMHG | DIASTOLIC BLOOD PRESSURE: 78 MMHG

## 2022-09-29 DIAGNOSIS — E78.2 MIXED HYPERLIPIDEMIA: ICD-10-CM

## 2022-09-29 DIAGNOSIS — Z98.84 S/P BARIATRIC SURGERY: ICD-10-CM

## 2022-09-29 DIAGNOSIS — E03.9 HYPOTHYROIDISM, UNSPECIFIED TYPE: Primary | ICD-10-CM

## 2022-09-29 DIAGNOSIS — E55.9 VITAMIN D DEFICIENCY: ICD-10-CM

## 2022-09-29 PROCEDURE — 99244 OFF/OP CNSLTJ NEW/EST MOD 40: CPT | Performed by: STUDENT IN AN ORGANIZED HEALTH CARE EDUCATION/TRAINING PROGRAM

## 2022-09-29 RX ORDER — LEVOTHYROXINE SODIUM 0.07 MG/1
TABLET ORAL
Qty: 90 TABLET | Refills: 3 | Status: SHIPPED | OUTPATIENT
Start: 2022-09-29

## 2022-09-29 NOTE — PROGRESS NOTES
Román Dobbs 65 y o  female MRN: [id number]    Encounter: [id number]      Assessment/Plan     Assessment: This is a 65y o -year-old female with hypothyroidism and history of Yuki-en-Y    Plan:    1  Hypothyroidism - hypothyroidism is well controlled  I have updated her script of levothyroxine to 75 mcg daily from 88 mcg 6/7 days per week  Repeat thyroid labs should be obtained in several months for monitoring  2  History of yuki-en-y surgery - will suggest using daily bariatric multivitamin plus calcium citrate supplementation  I believe she is not getting enough calcium through her diet and supplements  Preferred compound is calcium citrate & goal intake is 1200 - 1500 mg daily in divided doses  3  Vit D deficiency - this is stable  Will monitor    4  Mixed hyperlipidemia - LDL elevated, but 10y ASCVD risk is low (3 1%)  Advised focus on healthy diet and exercise  Will monitor  5  Overweight - presently on phentermine    6  Osteopenia - encouraged optimization of nutrition and activity as well as fall prevention     Orders Placed This Encounter   Procedures    T4, free Lab Collect    TSH, 3rd generation Lab Collect    Lipid panel Lab Collect Lab Collect    Comprehensive metabolic panel Lab Collect    Vitamin D 25 hydroxy Lab Collect     RTC 6-mo    CC: Hypothyroidism    History of Present Illness     HPI:    Hope Cabral presents today for initial evaluation  She she has longstanding hypothyroidism which is well controlled on levothyroxine 88 mcg daily except for Sunday  Her dose of levothyroxine has not changed for a long time to her best recollection  She does report some fatigue but also acknowledges poor sleep due to a respiratory symptoms she tends to experience worse at night, including cough and wheezing  She is prescribed a rescue inhaler and was also prescribed a maintenance Pulmicort inhaler which she has not been using regularly    She does have an upcoming visit planned with pulmonology  Radha has no history of radiation to the head or neck and no history of thyroid surgery  There is no family history of thyroid cancer  In addition to the aforementioned symptoms she also reports weight gain, which has her concerned due to her history of bypass surgery  Regarding history of malabsorption, Daja Moreira takes numerous supplements  She hopes to obtain some guidance for management of her supplements  Vit D3 4000 units daily + Zinc Quercetin Vit C + D3 (Vit C 250 mg, D3 2500), zinc 15 mg    Pediatric Flinstones with Iron   Iron 65 mg 2 tabs daily   Vit C 500 mg daily    Calcium carb + D3 (600 mg carbonate + D3 500 units)    Dalia reports no history of kidney stones, fractures or falls  Menopause occurred in mid-40s  She does reprot brittle teeth  Patient prescribed statin but has not yet started therapy  Her goal is to limit pharmacy  She does report parents with cardiovascular disease history       Review of Systems   Constitutional: Positive for fatigue and unexpected weight change  HENT: Negative for trouble swallowing and voice change  Eyes: Negative for photophobia and visual disturbance  Respiratory: Negative for shortness of breath  Cardiovascular: Negative for chest pain and palpitations  Gastrointestinal: Negative for nausea and vomiting  Endocrine: Negative for cold intolerance and heat intolerance  Genitourinary: Negative for flank pain  Neurological: Negative for tremors  Psychiatric/Behavioral: Positive for sleep disturbance  All other systems reviewed and are negative        Historical Information   Past Medical History:   Diagnosis Date    Disease of thyroid gland      Past Surgical History:   Procedure Laterality Date    REDUCTION MAMMAPLASTY Bilateral 1985     Social History   Social History     Substance and Sexual Activity   Alcohol Use None     Social History     Substance and Sexual Activity   Drug Use Not on file     Social History Tobacco Use   Smoking Status Never Smoker   Smokeless Tobacco Never Used     Family History:   Family History   Problem Relation Age of Onset    No Known Problems Mother     No Known Problems Father     No Known Problems Sister     No Known Problems Daughter     No Known Problems Maternal Grandmother     No Known Problems Paternal Grandmother     No Known Problems Paternal Aunt        Meds/Allergies   Current Outpatient Medications   Medication Sig Dispense Refill    albuterol (ProAir HFA) 90 mcg/act inhaler Inhale 2 puffs every 6 (six) hours as needed for wheezing 18 g 2    budesonide (Pulmicort Flexhaler) 90 MCG/ACT inhaler Inhale 1 puff 2 (two) times a day Rinse mouth after use  1 each 2    escitalopram (LEXAPRO) 10 mg tablet Take 10 mg by mouth daily      levothyroxine 88 mcg tablet take 1 tablet by mouth 6 DAYS PER WEEK      phentermine 37 5 MG capsule Take 1 capsule (37 5 mg total) by mouth every morning 30 capsule 0    rosuvastatin (CRESTOR) 10 MG tablet Take 1 tablet (10 mg total) by mouth daily 90 tablet 3     No current facility-administered medications for this visit  Allergies   Allergen Reactions    Sulfa Antibiotics Hives and Rash       Objective   Vitals: Blood pressure 130/78, pulse 70, height 5' 4" (1 626 m), weight 77 2 kg (170 lb 3 2 oz)  Physical Exam  Vitals reviewed  Constitutional:       Appearance: Normal appearance  HENT:      Head: Normocephalic and atraumatic  Nose: Nose normal    Eyes:      General: No scleral icterus  Conjunctiva/sclera: Conjunctivae normal    Neck:      Thyroid: No thyroid mass, thyromegaly or thyroid tenderness  Cardiovascular:      Rate and Rhythm: Normal rate and regular rhythm  Pulmonary:      Effort: Pulmonary effort is normal  No respiratory distress  Musculoskeletal:      Cervical back: Normal range of motion  Right lower leg: No edema  Left lower leg: No edema     Lymphadenopathy:      Cervical: No cervical adenopathy  Neurological:      General: No focal deficit present  Mental Status: She is alert  Psychiatric:         Mood and Affect: Mood normal          Behavior: Behavior normal          The history was obtained from the review of the chart, patient      Lab Results:   Lab Results   Component Value Date/Time    TSH 3RD GENERATON 0 628 08/01/2022 01:50 PM    Free T4 1 02 08/01/2022 01:50 PM     Component      Latest Ref Rng & Units 8/1/2022   WBC      4 31 - 10 16 Thousand/uL 6 81   Red Blood Cell Count      3 81 - 5 12 Million/uL 4 19   Hemoglobin      11 5 - 15 4 g/dL 13 4   HCT      34 8 - 46 1 % 41 4   MCV      82 - 98 fL 99 (H)   MCH      26 8 - 34 3 pg 32 0   MCHC      31 4 - 37 4 g/dL 32 4   RDW      11 6 - 15 1 % 13 0   MPV      8 9 - 12 7 fL 9 9   Platelet Count      543 - 390 Thousands/uL 263   nRBC      /100 WBCs 0   Neutrophils %      43 - 75 % 52   Immat GRANS %      0 - 2 % 0   Lymphocytes Relative      14 - 44 % 32   Monocytes Relative      4 - 12 % 8   Eosinophils      0 - 6 % 8 (H)   Basophils Relative      0 - 1 % 0   Absolute Neutrophils      1 85 - 7 62 Thousands/µL 3 50   Immature Grans Absolute      0 00 - 0 20 Thousand/uL 0 01   Lymphocytes Absolute      0 60 - 4 47 Thousands/µL 2 15   Absolute Monocytes      0 17 - 1 22 Thousand/µL 0 57   Absolute Eosinophils      0 00 - 0 61 Thousand/µL 0 55   Basophils Absolute      0 00 - 0 10 Thousands/µL 0 03   Sodium      135 - 147 mmol/L 141   Potassium      3 5 - 5 3 mmol/L 3 4 (L)   Chloride      96 - 108 mmol/L 110 (H)   CO2      21 - 32 mmol/L 26   Anion Gap      4 - 13 mmol/L 5   BUN      5 - 25 mg/dL 12   Creatinine      0 60 - 1 30 mg/dL 0 79   GLUCOSE FASTING      65 - 99 mg/dL 83   Calcium      8 3 - 10 1 mg/dL 9 2   CORRECTED CALCIUM      8 3 - 10 1 mg/dL 9 7   AST      5 - 45 U/L 23   ALT      12 - 78 U/L 35   Alkaline Phosphatase      46 - 116 U/L 95   Total Protein      6 4 - 8 4 g/dL 6 7   Albumin      3 5 - 5 0 g/dL 3 4 (L) TOTAL BILIRUBIN      0 20 - 1 00 mg/dL 0 42   eGFR      ml/min/1 73sq m 83   Cholesterol      See Comment mg/dL 247 (H)   Triglycerides      See Comment mg/dL 228 (H)   HDL      >=50 mg/dL 49 (L)   LDL Calculated      0 - 100 mg/dL 152 (H)   Non-HDL Cholesterol      mg/dl 198   Vit D, 25-Hydroxy      30 0 - 100 0 ng/mL 45 3   Free T4      0 76 - 1 46 ng/dL 1 02   TSH 3RD GENERATON      0 450 - 4 500 uIU/mL 0 628   INSULIN, FASTING      3 0 - 25 0 mU/L 6 1     Imaging Studies:     8/17/2022    CENTRAL  DXA SCAN     CLINICAL HISTORY:  52-year-old postmenopausal female  OTHER RISK FACTORS:  SSRI therapy      PHARMACOLOGIC THERAPY FOR OSTEOPOROSIS:  None      TECHNIQUE: Bone densitometry was performed using a GE Lunar Prodigy  bone densitometer  Regions of interest appear properly placed       COMPARISON: 2/20/2017      RESULTS:      LUMBAR SPINE  Level: L1-L4 :   BMD:  0 91  gm/cm2   T-score: -2 3         LEFT TOTAL HIP:   BMD: 0 798  gm/cm2   T-score: -1 7      LEFT FEMORAL NECK:   BMD: 0 725  gm/cm2   T score: -2 3      RIGHT TOTAL HIP:   BMD:  0 801  gm/cm2   T-score: -1 6     RIGHT FEMORAL NECK:   BMD:  0 721  gm/cm2    T score: -2 3         IMPRESSION:     1  Low bone mass (osteopenia)      2  Since a DXA study from 2/20/2017, there has been:  A  STATISTICALLY SIGNIFICANT INCREASE in bone mineral density of  0 017 g/cm2 (2 2)% in the hips            3  The 10 year risk of hip fracture is 1 4% with the 10 year risk of major osteoporotic fracture being 8 8% as calculated by the [place]/WHO fracture risk assessment tool (FRAX)     4  The current NOF guidelines recommend treating patients with a T-score of -2 5 or less in the lumbar spine or hips, or in post-menopausal women and men over the age of 48 with low bone mass (osteopenia) and a FRAX 10 year risk score of >3% for hip   fracture and/or >20% for major osteoporotic fracture      5    The NOF recommends follow-up DXA in 1-2 years after initiating therapy for osteoporosis and every 2 years thereafter  More frequent evaluation is appropriate for patients with conditions associated with rapid bone loss, such as glucocorticoid   therapy  The interval between DXA screenings may be longer for individuals without major risk factors and initial T-score in the normal or upper low bone mass range  I have personally reviewed pertinent reports  Portions of the record may have been created with voice recognition software  Occasional wrong word or "sound a like" substitutions may have occurred due to the inherent limitations of voice recognition software  Read the chart carefully and recognize, using context, where substitutions have occurred

## 2022-10-04 ENCOUNTER — OFFICE VISIT (OUTPATIENT)
Dept: FAMILY MEDICINE CLINIC | Facility: CLINIC | Age: 57
End: 2022-10-04
Payer: COMMERCIAL

## 2022-10-04 VITALS
RESPIRATION RATE: 14 BRPM | OXYGEN SATURATION: 98 % | DIASTOLIC BLOOD PRESSURE: 80 MMHG | HEIGHT: 64 IN | TEMPERATURE: 97.5 F | WEIGHT: 169.2 LBS | SYSTOLIC BLOOD PRESSURE: 130 MMHG | BODY MASS INDEX: 28.89 KG/M2 | HEART RATE: 81 BPM

## 2022-10-04 DIAGNOSIS — J45.30 MILD PERSISTENT ASTHMA WITHOUT COMPLICATION: ICD-10-CM

## 2022-10-04 PROBLEM — E66.811 CLASS 1 OBESITY DUE TO EXCESS CALORIES WITH SERIOUS COMORBIDITY AND BODY MASS INDEX (BMI) OF 30.0 TO 30.9 IN ADULT: Status: ACTIVE | Noted: 2022-10-04

## 2022-10-04 PROBLEM — E66.811 CLASS 1 OBESITY DUE TO EXCESS CALORIES WITH SERIOUS COMORBIDITY AND BODY MASS INDEX (BMI) OF 30.0 TO 30.9 IN ADULT: Status: RESOLVED | Noted: 2022-10-04 | Resolved: 2022-10-04

## 2022-10-04 PROBLEM — E66.09 CLASS 1 OBESITY DUE TO EXCESS CALORIES WITH SERIOUS COMORBIDITY AND BODY MASS INDEX (BMI) OF 30.0 TO 30.9 IN ADULT: Status: ACTIVE | Noted: 2022-10-04

## 2022-10-04 PROBLEM — E66.09 CLASS 1 OBESITY DUE TO EXCESS CALORIES WITH SERIOUS COMORBIDITY AND BODY MASS INDEX (BMI) OF 30.0 TO 30.9 IN ADULT: Status: RESOLVED | Noted: 2022-10-04 | Resolved: 2022-10-04

## 2022-10-04 PROCEDURE — 99213 OFFICE O/P EST LOW 20 MIN: CPT | Performed by: FAMILY MEDICINE

## 2022-10-04 RX ORDER — PHENTERMINE HYDROCHLORIDE 37.5 MG/1
37.5 CAPSULE ORAL EVERY MORNING
Qty: 30 CAPSULE | Refills: 0 | Status: SHIPPED | OUTPATIENT
Start: 2022-10-04 | End: 2022-10-19 | Stop reason: SDUPTHER

## 2022-10-04 NOTE — ASSESSMENT & PLAN NOTE
Her chest x-ray and pulmonary function test came back normal   She stated Pulmicort did not help  She was given sample for inhaler  She has an appointment to see pulmonologist next week

## 2022-10-04 NOTE — PROGRESS NOTES
Name: Laura Brink      : 1965      MRN: [id number]  Encounter Provider: Maxwell Fontana MD  Encounter Date: 10/4/2022   Encounter department: [facility] [address] PRIMARY CARE    Assessment & Plan     1  BMI 29 0-29 9,adult  Assessment & Plan:  Improving  Continue to follow low carb diet and regular exercise  Continue on phentermine  She was given refill  Come back in 1 month  Orders:  -     phentermine 37 5 MG capsule; Take 1 capsule (37 5 mg total) by mouth every morning    2  Mild persistent asthma without complication  Assessment & Plan:  Her chest x-ray and pulmonary function test came back normal   She stated Pulmicort did not help  She was given sample for inhaler  She has an appointment to see pulmonologist next week  Subjective     She is here today for follow-up multiple medical problems  She has been taking her phentermine to help with her weight loss  She lost 3 lb since the last visit  She denies any side effects  She was given prescription for Pulmicort last visit  She has been using inhaler without much improvement  She had her pulmonary function test that came back normal   She has an appointment to see pulmonologist next week  Review of Systems   Constitutional: Negative for chills and fever  HENT: Negative for trouble swallowing  Eyes: Negative for visual disturbance  Respiratory: Negative for cough and shortness of breath  Cardiovascular: Negative for chest pain, palpitations and leg swelling  Gastrointestinal: Negative for abdominal pain, constipation and diarrhea  Endocrine: Negative for cold intolerance and heat intolerance  Genitourinary: Negative for difficulty urinating and dysuria  Musculoskeletal: Negative for gait problem  Skin: Negative for rash  Neurological: Negative for dizziness, tremors, seizures and headaches  Hematological: Negative for adenopathy  Psychiatric/Behavioral: Negative for behavioral problems  Past Medical History:   Diagnosis Date    Disease of thyroid gland      Past Surgical History:   Procedure Laterality Date    REDUCTION MAMMAPLASTY Bilateral 1985     Family History   Problem Relation Age of Onset    No Known Problems Mother     No Known Problems Father     No Known Problems Sister     No Known Problems Daughter     No Known Problems Maternal Grandmother     No Known Problems Paternal Grandmother     No Known Problems Paternal Aunt      Social History     Socioeconomic History    Marital status: /Civil Union     Spouse name: None    Number of children: None    Years of education: None    Highest education level: None   Occupational History    None   Tobacco Use    Smoking status: Never Smoker    Smokeless tobacco: Never Used   Vaping Use    Vaping Use: Never used   Substance and Sexual Activity    Alcohol use: None    Drug use: None    Sexual activity: None   Other Topics Concern    None   Social History Narrative    None     Social Determinants of Health     Financial Resource Strain: Not on file   Food Insecurity: Not on file   Transportation Needs: Not on file   Physical Activity: Not on file   Stress: Not on file   Social Connections: Not on file   Intimate Partner Violence: Not on file   Housing Stability: Not on file     Current Outpatient Medications on File Prior to Visit   Medication Sig    albuterol (ProAir HFA) 90 mcg/act inhaler Inhale 2 puffs every 6 (six) hours as needed for wheezing    budesonide (Pulmicort Flexhaler) 90 MCG/ACT inhaler Inhale 1 puff 2 (two) times a day Rinse mouth after use      escitalopram (LEXAPRO) 10 mg tablet Take 10 mg by mouth daily    levothyroxine 75 mcg tablet Take one tablet daily on empty stomach 1 hour before breakfast and 4 hours apart from calcium and vitamin D supplementation    rosuvastatin (CRESTOR) 10 MG tablet Take 1 tablet (10 mg total) by mouth daily    [DISCONTINUED] phentermine 37 5 MG capsule Take 1 capsule (37 5 mg total) by mouth every morning     Allergies   Allergen Reactions    Sulfa Antibiotics Hives and Rash       There is no immunization history on file for this patient  Objective     /80 (BP Location: Left arm, Patient Position: Sitting, Cuff Size: Large)   Pulse 81   Temp 97 5 °F (36 4 °C) (Tympanic)   Resp 14   Ht 5' 4" (1 626 m)   Wt 76 7 kg (169 lb 3 2 oz)   SpO2 98%   BMI 29 04 kg/m²     Physical Exam  Vitals and nursing note reviewed  Constitutional:       Appearance: She is well-developed  HENT:      Head: Normocephalic and atraumatic  Eyes:      Pupils: Pupils are equal, round, and reactive to light  Cardiovascular:      Rate and Rhythm: Normal rate and regular rhythm  Heart sounds: Normal heart sounds  Pulmonary:      Effort: Pulmonary effort is normal       Breath sounds: Normal breath sounds  Abdominal:      General: Bowel sounds are normal       Palpations: Abdomen is soft  Musculoskeletal:         General: Normal range of motion  Cervical back: Normal range of motion and neck supple  Lymphadenopathy:      Cervical: No cervical adenopathy  Skin:     General: Skin is warm  Neurological:      Mental Status: She is alert and oriented to person, place, and time  Cranial Nerves: No cranial nerve deficit         Magdiel Wiley MD

## 2022-10-04 NOTE — ASSESSMENT & PLAN NOTE
Improving  Continue to follow low carb diet and regular exercise  Continue on phentermine  She was given refill  Come back in 1 month

## 2022-10-09 NOTE — PROGRESS NOTES
Pulmonary Consultation   Jordan Acevedo 65 y o  female MRN: [id number]  10/10/2022      Assessment:  Post acute viral bronchitis  · Likely the culprit for the symptoms, now improving on triple inhalers, possible underlying asthma given the history of environmental/seasonal allergies  · + COVID 19 PCR in April/May with similar symptoms  · Recurrent symptoms in July with negative testing however suspect viral URI bronchitis  · Clear lung fields on chest x-ray, PFT without obstruction/restriction, no BD response and normal DLCO    Plan:  · May continue Breztri 2 puffs q 12 for another 14 days, then 1 puff q 12 until next visit  · Reviewed/reinforced the proper inhaler use technique  · Counseled/explained about underlying etiologies and avoiding exposure to sick contact  · Offered bronchoprovocation/methacholine challenge test however she would like to hold off for now  · Continue albuterol p r n  Return in about 5 weeks (around 11/14/2022)  History of Present Illness     New Consult for:  Concern for asthma/bronchitis       Background  65 y o  female with a h/o hypothyroidism, seasonal allergies  Noted URI/wheezing, chest congestion, and rhinitis symptoms around July  Given 2 rounds of steroids with no significant improvement as well as p r n  Albuterol  Reports having positive COVID-19 PCR with nasal congestion/fatigue/and cough in April/May with positive exposure to sick contacts  Symptoms improved after that however had recurrence of the symptoms in July, tested negative for COVID-19 at that time  Also tried Pulmicort inhalers, no improvement of symptoms even with albuterol nebs during the PFT  Chest x-ray was normal   Reports no changes in environment, no new carpets, moving, pains  Recently given Breztri 2 weeks ago, with near resolution of all of the symptoms which she is currently using on daily basis      Hx of seasonal allergies as a child, used to be on allergy shots from age of 12 until age of 6  Noted allergic rhinitis with seasonal variation, or with exposure to grass, pet danders/care  No formal history of asthma, no ED visits, frequent steroid use, or hospitalization  Review of Systems  As per hpi, all other systems reviewed and were negative  Social/exposure history  Born in [place], moved to [address] [place] [address] as a child where she is raised and resides  Lifelong nonsmoker, nonalcoholic  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  No exposure to mold, has 1 dog for several years    Family history:  Seasonal allergies in her son and daughter    Studies:  Imaging and other studies: I have personally reviewed pertinent films in PACS  Chest x-ray 7/19/2022-clear lung fields    Pulmonary function testing:   PFT 09/09/2022-ratio 78%, FEV1 2 58 L/98%, FVC 3 32 L/101%  No BD response  TLC 99%, RV 97%, DLCO 112%  EKG, Pathology, and Other Studies: I have personally reviewed pertinent reports  Historical Information   Past Medical History:   Diagnosis Date   • Anemia     Post gastric bypass surgery   • Asthma     Allergy induced   • Disease of thyroid gland      Past Surgical History:   Procedure Laterality Date   • CHOLECYSTECTOMY      When I had weight loss surgery   • HERNIA REPAIR      Belly Button   • REDUCTION MAMMAPLASTY Bilateral 1985     Family History   Problem Relation Age of Onset   • Cancer Mother    • Hypertension Father         Has a pacemaker as well  • Asthma Sister    • No Known Problems Daughter    • No Known Problems Maternal Grandmother    • No Known Problems Paternal Grandmother    • No Known Problems Paternal Aunt          Medications/Allergies: Reviewed    Vitals: Blood pressure 128/68, pulse 74, temperature 97 8 °F (36 6 °C), resp  rate 16, height 5' 4" (1 626 m), weight 78 kg (172 lb), SpO2 99 %  Body mass index is 29 52 kg/m²  Oxygen Therapy  SpO2: 99 %  Oxygen Therapy: None (Room air)      Physical Exam  Body mass index is 29 52 kg/m²  Gen: not in acute distress,   Neck/Eyes: supple, no adenopathy, PERRL  Ear: normal appearance, no significant hearing impairment  Nose:  normal nasal mucosa, no drainage  Mouth:  unremarkable/normal appearance of lips, teeth and gums  Oropharynx: mucosa is moist, no focal lesions or erythema  Salivary glands: soft nontender  Chest: normal respiratory efforts, clear breath sounds bilaterally  CV: RRR, no murmurs appreciated, no edema  Abdomen: soft, non tender  Extremities:  No observed deformity   Skin: unremarkable  Neuro: AAO X3, no focal motor deficit         Labs:  Lab Results   Component Value Date    WBC 6 81 08/01/2022    HGB 13 4 08/01/2022    HCT 41 4 08/01/2022    MCV 99 (H) 08/01/2022     08/01/2022     Lab Results   Component Value Date    CALCIUM 9 2 08/01/2022    K 3 4 (L) 08/01/2022    CO2 26 08/01/2022     (H) 08/01/2022    BUN 12 08/01/2022    CREATININE 0 79 08/01/2022     No results found for: IGE  Lab Results   Component Value Date    ALT 35 08/01/2022    AST 23 08/01/2022    ALKPHOS 95 08/01/2022           Portions of the record may have been created with voice recognition software  Occasional wrong word or "sound a like" substitutions may have occurred due to the inherent limitations of voice recognition software  Read the chart carefully and recognize, using context, where substitutions have occurred    [facility]

## 2022-10-10 ENCOUNTER — CONSULT (OUTPATIENT)
Dept: PULMONOLOGY | Facility: CLINIC | Age: 57
End: 2022-10-10
Payer: COMMERCIAL

## 2022-10-10 VITALS
WEIGHT: 172 LBS | RESPIRATION RATE: 16 BRPM | TEMPERATURE: 97.8 F | HEART RATE: 74 BPM | DIASTOLIC BLOOD PRESSURE: 68 MMHG | BODY MASS INDEX: 29.37 KG/M2 | HEIGHT: 64 IN | OXYGEN SATURATION: 99 % | SYSTOLIC BLOOD PRESSURE: 128 MMHG

## 2022-10-10 DIAGNOSIS — J45.30 MILD PERSISTENT ASTHMA WITHOUT COMPLICATION: ICD-10-CM

## 2022-10-10 PROCEDURE — 99244 OFF/OP CNSLTJ NEW/EST MOD 40: CPT | Performed by: INTERNAL MEDICINE

## 2022-10-10 RX ORDER — BUDESONIDE, GLYCOPYRROLATE, AND FORMOTEROL FUMARATE 160; 9; 4.8 UG/1; UG/1; UG/1
2 AEROSOL, METERED RESPIRATORY (INHALATION)
COMMUNITY

## 2022-10-11 PROBLEM — J20.8 ACUTE BRONCHITIS DUE TO OTHER SPECIFIED ORGANISMS: Status: RESOLVED | Noted: 2022-07-12 | Resolved: 2022-10-11

## 2022-10-20 NOTE — TELEPHONE ENCOUNTER
Refills have been requested for the following medications:         phentermine 37 5 MG capsule [Wassim Mattiesamra]     [facility]erred pharmacy: [facility]  , [address]    Last seen 10/4/22  Has appt 11/11/22  Sent to provider for approval      1 STEPHEN CHAN 1965 [address] [facility]           Search Criteria    Name Date of Birth Date Range   Vidal Noonan 49- 10- To 10-     Requester Name Requested Date   Ozzie Reyna Thu Oct 20 2022 09:18:44 GMT-0400 Fantasma Leighton Daylight Time)     Summary   Prescriptions  2  Prescribers  1  Pharmacies  1  View Map    Drug Classes   Benzodiazepines  0  Stimulants  2  Opioids  0  Muscle Relaxants  0    Opioid Dosage   Total MME for Active Prescriptions  0    Average MME  0 00  MME Graph   MME Calculator       · Prescriptions  · Notifications    · Prescribers  · Pharmacies  · MME Graph      [] PA   Drug Categories:      [] Stimulants       Show  entries  Search:  Patient Id Prescription #  Filled Written Drug Label Qty Days Strength MME** Prescriber Pharmacy Payment REFILL #/Auth State Detail   1  [id number] 09/09/2022 09/06/2022 Phentermine Hcl (Capsule)  30 0 30 37 5 MG NA [facility]) [address] / 0 [place]    1  [id number] 08/02/2022 08/02/2022 Phentermine Hcl (Capsule)  30 0 30 37 5 MG NA MIMI) [address]

## 2022-10-21 RX ORDER — PHENTERMINE HYDROCHLORIDE 37.5 MG/1
37.5 CAPSULE ORAL EVERY MORNING
Qty: 30 CAPSULE | Refills: 0 | Status: SHIPPED | OUTPATIENT
Start: 2022-10-21

## 2022-10-24 ENCOUNTER — APPOINTMENT (OUTPATIENT)
Dept: RADIOLOGY | Facility: CLINIC | Age: 57
End: 2022-10-24
Payer: COMMERCIAL

## 2022-10-24 ENCOUNTER — OFFICE VISIT (OUTPATIENT)
Dept: URGENT CARE | Facility: CLINIC | Age: 57
End: 2022-10-24
Payer: COMMERCIAL

## 2022-10-24 VITALS
HEIGHT: 64 IN | TEMPERATURE: 98 F | WEIGHT: 170 LBS | RESPIRATION RATE: 18 BRPM | DIASTOLIC BLOOD PRESSURE: 80 MMHG | BODY MASS INDEX: 29.02 KG/M2 | HEART RATE: 70 BPM | SYSTOLIC BLOOD PRESSURE: 146 MMHG | OXYGEN SATURATION: 99 %

## 2022-10-24 DIAGNOSIS — R07.81 RIB PAIN ON LEFT SIDE: Primary | ICD-10-CM

## 2022-10-24 DIAGNOSIS — R07.81 RIB PAIN ON LEFT SIDE: ICD-10-CM

## 2022-10-24 PROCEDURE — 71101 X-RAY EXAM UNILAT RIBS/CHEST: CPT

## 2022-10-24 PROCEDURE — S9088 SERVICES PROVIDED IN URGENT: HCPCS | Performed by: PHYSICIAN ASSISTANT

## 2022-10-24 PROCEDURE — 99213 OFFICE O/P EST LOW 20 MIN: CPT | Performed by: PHYSICIAN ASSISTANT

## 2022-10-24 NOTE — PROGRESS NOTES
[facility]      NAME: Venu Smiley is a 65 y o  female  : 1965    MRN: [id number]  DATE: 2022  TIME: 2:40 PM    Assessment and Plan   Rib pain on left side [R07 81]  1  Rib pain on left side  XR ribs left w pa chest min 3 views       Patient Instructions   Xray appears negative for any fracture  Will follow up with radiologist report when available  Incentive spirometer given  Did hear few crackles in left lower lung  To follow up with PCP in next 2-3 days  Patient agreeable to plan  To present to the ER if symptoms worsen  Chief Complaint     Chief Complaint   Patient presents with   • Rib Injury     Left side  Last week was digging up a tree root  Thinks she injured a rib on her left side  No trauma to the area  History of Present Illness   Venu Smiley presents to the clinic c/o    Hx of osteopenia  Chest Pain   This is a new (left rib pain, not chest pain) problem  The current episode started in the past 7 days (when digging up a tree root)  The onset quality is sudden  The problem occurs constantly  The problem has been unchanged  The pain is present in the lateral region (left rib region, under breast)  The pain is at a severity of 7/10  The pain is moderate  The quality of the pain is described as sharp  The pain does not radiate  Pertinent negatives include no abdominal pain, back pain, cough, fever, palpitations, shortness of breath or vomiting  The pain is aggravated by nothing  She has tried NSAIDs for the symptoms  The treatment provided mild relief  Pertinent negatives for past medical history include no seizures  Review of Systems   Review of Systems   Constitutional: Negative for chills and fever  HENT: Negative for ear pain and sore throat  Eyes: Negative for pain and visual disturbance  Respiratory: Negative for cough and shortness of breath  Cardiovascular: Negative for chest pain and palpitations     Gastrointestinal: Negative for abdominal pain and vomiting  Genitourinary: Negative for dysuria and hematuria  Musculoskeletal: Positive for arthralgias  Negative for back pain  Skin: Negative for color change and rash  Neurological: Negative for seizures and syncope  All other systems reviewed and are negative  Current Medications     Long-Term Medications   Medication Sig Dispense Refill   • escitalopram (LEXAPRO) 10 mg tablet Take 10 mg by mouth daily     • levothyroxine 75 mcg tablet Take one tablet daily on empty stomach 1 hour before breakfast and 4 hours apart from calcium and vitamin D supplementation 90 tablet 3   • phentermine 37 5 MG capsule Take 1 capsule (37 5 mg total) by mouth every morning 30 capsule 0   • rosuvastatin (CRESTOR) 10 MG tablet Take 1 tablet (10 mg total) by mouth daily 90 tablet 3   • [DISCONTINUED] budesonide (Pulmicort Flexhaler) 90 MCG/ACT inhaler Inhale 1 puff 2 (two) times a day Rinse mouth after use   (Patient not taking: Reported on 10/10/2022) 1 each 2       Current Allergies     Allergies as of 10/24/2022 - Reviewed 10/24/2022   Allergen Reaction Noted   • Sulfa antibiotics Hives and Rash 01/06/2017            The following portions of the patient's history were reviewed and updated as appropriate: allergies, current medications, past family history, past medical history, past social history, past surgical history and problem list   Past Medical History:   Diagnosis Date   • Anemia     Post gastric bypass surgery   • Asthma     Allergy induced   • Disease of thyroid gland      Past Surgical History:   Procedure Laterality Date   • CHOLECYSTECTOMY      When I had weight loss surgery   • HERNIA REPAIR      Belly Button   • REDUCTION MAMMAPLASTY Bilateral 1985     Social History     Socioeconomic History   • Marital status: /Civil Union     Spouse name: Not on file   • Number of children: Not on file   • Years of education: Not on file   • Highest education level: Not on file Occupational History   • Not on file   Tobacco Use   • Smoking status: Never Smoker   • Smokeless tobacco: Never Used   Vaping Use   • Vaping Use: Never used   Substance and Sexual Activity   • Alcohol use: Not Currently   • Drug use: Not Currently   • Sexual activity: Yes     Partners: Male     Birth control/protection: Post-menopausal   Other Topics Concern   • Not on file   Social History Narrative   • Not on file     Social Determinants of Health     Financial Resource Strain: Not on file   Food Insecurity: Not on file   Transportation Needs: Not on file   Physical Activity: Not on file   Stress: Not on file   Social Connections: Not on file   Intimate Partner Violence: Not on file   Housing Stability: Not on file       Objective   /80   Pulse 70   Temp 98 °F (36 7 °C)   Resp 18   Ht 5' 4" (1 626 m)   Wt 77 1 kg (170 lb)   SpO2 99%   BMI 29 18 kg/m²      Physical Exam     Physical Exam  Vitals and nursing note reviewed  Constitutional:       General: She is not in acute distress  Appearance: She is well-developed  She is not diaphoretic  HENT:      Head: Normocephalic and atraumatic  Right Ear: External ear normal       Left Ear: External ear normal       Nose: Nose normal    Eyes:      General: No scleral icterus  Right eye: No discharge  Left eye: No discharge  Conjunctiva/sclera: Conjunctivae normal    Cardiovascular:      Rate and Rhythm: Normal rate and regular rhythm  Heart sounds: Normal heart sounds  No murmur heard  No friction rub  No gallop  Pulmonary:      Effort: Pulmonary effort is normal  No respiratory distress  Breath sounds: Normal breath sounds  No decreased breath sounds, wheezing, rhonchi or rales  Comments: Few crackles noted in left lower lung  Chest:       Skin:     General: Skin is warm and dry  Coloration: Skin is not pale  Findings: No erythema or rash     Neurological:      Mental Status: She is alert and oriented to person, place, and time  Psychiatric:         Behavior: Behavior normal          Thought Content:  Thought content normal          Judgment: Judgment normal          Anila Khan PA-C

## 2022-11-21 NOTE — TELEPHONE ENCOUNTER
Refills have been requested for the following medications:         phentermine 37 5 MG capsule [Wassim Abosamra]     Preferred pharmacy: [facility]  , [address]    Last seen 10/4/22  Has appt 12/6/22     1 [facility] CHAN 1965 F 1994 [id number] [facility]           Search Criteria    Name Date of Birth Date Range   Kalee Grain 89- 11- To 11-     Requester Name Requested Date   Patrick Larose Nov 21 2022 08:39:25 GMT-0500 [place] Standard Time)     Summary   Prescriptions  4  Prescribers  1  Pharmacies  1  View Map  Drug Classes   Benzodiazepines  0  Stimulants  4  Opioids  0  Muscle Relaxants  0  Opioid Dosage   Total MME for Active Prescriptions  0    Average MME  0 00  MME Graph   MME Calculator     • Prescriptions  • Notifications    • Prescribers  • Pharmacies  • MME Graph    [] PA   Drug Categories:      [] Stimulants       Show  entries  Search:  Patient Id Prescription #  Filled Written Drug Label Qty Days Strength MME** Prescriber Pharmacy Payment REFILL #/Auth State Detail   1  [id number] 11/19/2022  10/21/2022 Phentermine Hcl (Capsule)  30 0 30 37 5 MG NA MIMI) [facility]  [facility] 0 / 0 [place]    1  [id number] 10/19/2022  10/04/2022 Phentermine Hcl (Capsule)  30 0 30 37 5 MG NA MIMI) [address] 0 / 0 [place]    1  [id number] 09/09/2022 09/06/2022 Phentermine Hcl (Capsule)  30 0 30 37 5 MG NA WASSIM(MD) [address] 0 / 0 [place]    1  [id number] 08/02/2022 08/02/2022 Phentermine Hcl (Capsule)  30 0 30 37 5 MG NA VICTOR MANUELM(MD) [address]

## 2022-11-22 RX ORDER — PHENTERMINE HYDROCHLORIDE 37.5 MG/1
37.5 CAPSULE ORAL EVERY MORNING
Qty: 30 CAPSULE | Refills: 0 | Status: SHIPPED | OUTPATIENT
Start: 2022-11-22

## 2022-12-06 ENCOUNTER — OFFICE VISIT (OUTPATIENT)
Dept: FAMILY MEDICINE CLINIC | Facility: CLINIC | Age: 57
End: 2022-12-06

## 2022-12-06 VITALS
WEIGHT: 153.8 LBS | HEIGHT: 64 IN | TEMPERATURE: 97.6 F | HEART RATE: 78 BPM | DIASTOLIC BLOOD PRESSURE: 84 MMHG | RESPIRATION RATE: 16 BRPM | SYSTOLIC BLOOD PRESSURE: 124 MMHG | OXYGEN SATURATION: 97 % | BODY MASS INDEX: 26.26 KG/M2

## 2022-12-06 DIAGNOSIS — D50.8 OTHER IRON DEFICIENCY ANEMIA: ICD-10-CM

## 2022-12-06 DIAGNOSIS — E55.9 VITAMIN D INSUFFICIENCY: ICD-10-CM

## 2022-12-06 DIAGNOSIS — Z12.31 ENCOUNTER FOR SCREENING MAMMOGRAM FOR BREAST CANCER: ICD-10-CM

## 2022-12-06 DIAGNOSIS — Z12.11 COLON CANCER SCREENING: ICD-10-CM

## 2022-12-06 DIAGNOSIS — E78.49 OTHER HYPERLIPIDEMIA: ICD-10-CM

## 2022-12-06 DIAGNOSIS — E03.8 OTHER SPECIFIED HYPOTHYROIDISM: ICD-10-CM

## 2022-12-06 DIAGNOSIS — Z00.01 ABNORMAL WELLNESS EXAM: ICD-10-CM

## 2022-12-06 DIAGNOSIS — G56.03 BILATERAL CARPAL TUNNEL SYNDROME: Primary | ICD-10-CM

## 2022-12-06 PROBLEM — D64.9 ABSOLUTE ANEMIA: Status: ACTIVE | Noted: 2022-12-06

## 2022-12-06 RX ORDER — DICLOFENAC SODIUM 75 MG/1
75 TABLET, DELAYED RELEASE ORAL 2 TIMES DAILY
Qty: 60 TABLET | Refills: 1 | Status: SHIPPED | OUTPATIENT
Start: 2022-12-06

## 2022-12-06 NOTE — PROGRESS NOTES
Name: Crissy [place]      : 1965      MRN: [id number]  Encounter Provider: Severino Alegria MD  Encounter Date: 2022   Encounter department: [address] [place] [address]  Bilateral carpal tunnel syndrome  Assessment & Plan:  She was given prescription for diclofenac 75 mg 1 tablet twice a day  Discussed about possible side effects  Referral to hand physical therapy    Orders:  -     diclofenac (VOLTAREN) 75 mg EC tablet; Take 1 tablet (75 mg total) by mouth 2 (two) times a day  -     Ambulatory Referral to PT/OT Hand Therapy; Future    2  Colon cancer screening  -     Cologuard    3  Other hyperlipidemia  Assessment & Plan:  Not well controlled  Discussed with patient to continue on her Crestor  She was told to check her blood work  Continue to follow low-fat diet and regular exercise  Orders:  -     CBC and differential; Future  -     Comprehensive metabolic panel; Future  -     Lipid Panel with Direct LDL reflex; Future  -     TSH, 3rd generation with Free T4 reflex; Future    4  Vitamin D insufficiency  -     Vitamin D 25 hydroxy; Future    5  Other iron deficiency anemia  Assessment & Plan: We will continue to monitor her CBC and iron panel  Orders:  -     Iron Panel (Includes Ferritin, Iron Sat%, Iron, and TIBC); Future    6  Encounter for screening mammogram for breast cancer  -     Mammo screening bilateral w 3d & cad; Future; Expected date: 2022    7  Other specified hypothyroidism  Assessment & Plan:  Stable  Continue levothyroxine 75 mcg daily  We will continue to monitor  8  Abnormal wellness exam  Assessment & Plan: It was discussed about immunizations, diet, exercise and safety measures  9  BMI 26 0-26 9,adult  Assessment & Plan:  Improving  Continue on phentermine  We will continue to monitor  Come back in 1 month  Subjective     She is here today for follow-up multiple medical problems    She has been taking her medications  She denies any side effects with her medications  She is due for blood work  She has been taking her phentermine  She lost 23 pounds since starting on phentermine  Today she complains of numbness and tingling in her hands bilateral   She was diagnosed before with carpal tunnel  Review of Systems   Constitutional: Negative for chills and fever  HENT: Negative for trouble swallowing  Eyes: Negative for visual disturbance  Respiratory: Negative for cough and shortness of breath  Cardiovascular: Negative for chest pain, palpitations and leg swelling  Gastrointestinal: Negative for abdominal pain, constipation and diarrhea  Endocrine: Negative for cold intolerance and heat intolerance  Genitourinary: Negative for difficulty urinating and dysuria  Musculoskeletal: Negative for gait problem  Skin: Negative for rash  Neurological: Positive for numbness (Hands bilateral)  Negative for dizziness, tremors, seizures and headaches  Hematological: Negative for adenopathy  Psychiatric/Behavioral: Negative for behavioral problems  Past Medical History:   Diagnosis Date   • Anemia     Post gastric bypass surgery   • Asthma     Allergy induced   • Disease of thyroid gland      Past Surgical History:   Procedure Laterality Date   • CHOLECYSTECTOMY      When I had weight loss surgery   • HERNIA REPAIR      Belly Button   • REDUCTION MAMMAPLASTY Bilateral 1985     Family History   Problem Relation Age of Onset   • Cancer Mother    • Hypertension Father         Has a pacemaker as well     • Asthma Sister    • No Known Problems Daughter    • No Known Problems Maternal Grandmother    • No Known Problems Paternal Grandmother    • No Known Problems Paternal Aunt      Social History     Socioeconomic History   • Marital status: /Civil Union     Spouse name: None   • Number of children: None   • Years of education: None   • Highest education level: None   Occupational History   • None   Tobacco Use   • Smoking status: Never   • Smokeless tobacco: Never   Vaping Use   • Vaping Use: Never used   Substance and Sexual Activity   • Alcohol use: Not Currently   • Drug use: Not Currently   • Sexual activity: Yes     Partners: Male     Birth control/protection: Post-menopausal   Other Topics Concern   • None   Social History Narrative   • None     Social Determinants of Health     Financial Resource Strain: Not on file   Food Insecurity: Not on file   Transportation Needs: Not on file   Physical Activity: Not on file   Stress: Not on file   Social Connections: Not on file   Intimate Partner Violence: Not on file   Housing Stability: Not on file     Current Outpatient Medications on File Prior to Visit   Medication Sig   • albuterol (ProAir HFA) 90 mcg/act inhaler Inhale 2 puffs every 6 (six) hours as needed for wheezing   • Budeson-Glycopyrrol-Formoterol (Breztri Aerosphere) 160-9-4 8 MCG/ACT AERO Inhale 2 puffs Rinse mouth after use  • escitalopram (LEXAPRO) 10 mg tablet Take 10 mg by mouth daily   • levothyroxine 75 mcg tablet Take one tablet daily on empty stomach 1 hour before breakfast and 4 hours apart from calcium and vitamin D supplementation   • phentermine 37 5 MG capsule Take 1 capsule (37 5 mg total) by mouth every morning   • rosuvastatin (CRESTOR) 10 MG tablet Take 1 tablet (10 mg total) by mouth daily   • [DISCONTINUED] budesonide (Pulmicort Flexhaler) 90 MCG/ACT inhaler Inhale 1 puff 2 (two) times a day Rinse mouth after use  (Patient not taking: Reported on 10/10/2022)     Allergies   Allergen Reactions   • Sulfa Antibiotics Hives and Rash       There is no immunization history on file for this patient      Objective     /84 (BP Location: Right arm, Patient Position: Sitting, Cuff Size: Standard)   Pulse 78   Temp 97 6 °F (36 4 °C) (Tympanic)   Resp 16   Ht 5' 4" (1 626 m)   Wt 69 8 kg (153 lb 12 8 oz)   SpO2 97%   BMI 26 40 kg/m²     Physical Exam  Vitals and nursing note reviewed  Constitutional:       Appearance: She is well-developed and well-nourished  HENT:      Head: Normocephalic and atraumatic  Eyes:      Extraocular Movements: EOM normal       Pupils: Pupils are equal, round, and reactive to light  Cardiovascular:      Rate and Rhythm: Normal rate and regular rhythm  Heart sounds: Normal heart sounds  Pulmonary:      Effort: Pulmonary effort is normal       Breath sounds: Normal breath sounds  Abdominal:      General: Bowel sounds are normal       Palpations: Abdomen is soft  Musculoskeletal:         General: No edema  Normal range of motion  Cervical back: Normal range of motion and neck supple  Lymphadenopathy:      Cervical: No cervical adenopathy  Skin:     General: Skin is warm  Neurological:      Mental Status: She is alert and oriented to person, place, and time  Cranial Nerves: No cranial nerve deficit     Psychiatric:         Mood and Affect: Mood and affect normal        Catina Crabtree MD

## 2022-12-06 NOTE — PROGRESS NOTES
Name: Vish Rader      : 1965      MRN: [id number]  Encounter Provider: Rhiannon Cardona MD  Encounter Date: 2022   Encounter department: [address] [place] [address]  Bilateral carpal tunnel syndrome  Assessment & Plan:  She was given prescription for diclofenac 75 mg 1 tablet twice a day  Discussed about possible side effects  Referral to hand physical therapy    Orders:  -     diclofenac (VOLTAREN) 75 mg EC tablet; Take 1 tablet (75 mg total) by mouth 2 (two) times a day  -     Ambulatory Referral to PT/OT Hand Therapy; Future    2  Colon cancer screening  -     Cologuard    3  Other hyperlipidemia  Assessment & Plan:  Not well controlled  Discussed with patient to continue on her Crestor  She was told to check her blood work  Continue to follow low-fat diet and regular exercise  Orders:  -     CBC and differential; Future  -     Comprehensive metabolic panel; Future  -     Lipid Panel with Direct LDL reflex; Future  -     TSH, 3rd generation with Free T4 reflex; Future    4  Vitamin D insufficiency  -     Vitamin D 25 hydroxy; Future    5  Other iron deficiency anemia  Assessment & Plan: We will continue to monitor her CBC and iron panel  Orders:  -     Iron Panel (Includes Ferritin, Iron Sat%, Iron, and TIBC); Future    6  Encounter for screening mammogram for breast cancer  -     Mammo screening bilateral w 3d & cad; Future; Expected date: 2022    7  Other specified hypothyroidism  Assessment & Plan:  Stable  Continue levothyroxine 75 mcg daily  We will continue to monitor  8  Abnormal wellness exam  Assessment & Plan: It was discussed about immunizations, diet, exercise and safety measures  9  BMI 26 0-26 9,adult  Assessment & Plan:  Improving  Continue on phentermine  We will continue to monitor  Come back in 1 month  Subjective     She is here today for follow-up multiple medical problems    She has been taking her medications  She denies any side effects with her medications  She is due for blood work  She has been taking her phentermine  She lost 23 pounds since starting on phentermine  Today she complains of numbness and tingling in her hands bilateral   She was diagnosed before with carpal tunnel  Hyperlipidemia  Pertinent negatives include no chest pain or shortness of breath  Review of Systems   Constitutional: Negative for chills and fever  HENT: Negative for trouble swallowing  Eyes: Negative for visual disturbance  Respiratory: Negative for cough and shortness of breath  Cardiovascular: Negative for chest pain, palpitations and leg swelling  Gastrointestinal: Negative for abdominal pain, constipation and diarrhea  Endocrine: Negative for cold intolerance and heat intolerance  Genitourinary: Negative for difficulty urinating and dysuria  Musculoskeletal: Negative for gait problem  Skin: Negative for rash  Neurological: Positive for numbness (Hands bilateral)  Negative for dizziness, tremors, seizures and headaches  Hematological: Negative for adenopathy  Psychiatric/Behavioral: Negative for behavioral problems  Past Medical History:   Diagnosis Date   • Anemia     Post gastric bypass surgery   • Asthma     Allergy induced   • Disease of thyroid gland      Past Surgical History:   Procedure Laterality Date   • CHOLECYSTECTOMY      When I had weight loss surgery   • HERNIA REPAIR      Belly Button   • REDUCTION MAMMAPLASTY Bilateral 1985     Family History   Problem Relation Age of Onset   • Cancer Mother    • Hypertension Father         Has a pacemaker as well     • Asthma Sister    • No Known Problems Daughter    • No Known Problems Maternal Grandmother    • No Known Problems Paternal Grandmother    • No Known Problems Paternal Aunt      Social History     Socioeconomic History   • Marital status: /Civil Union     Spouse name: None   • Number of children: None   • Years of education: None   • Highest education level: None   Occupational History   • None   Tobacco Use   • Smoking status: Never   • Smokeless tobacco: Never   Vaping Use   • Vaping Use: Never used   Substance and Sexual Activity   • Alcohol use: Not Currently   • Drug use: Not Currently   • Sexual activity: Yes     Partners: Male     Birth control/protection: Post-menopausal   Other Topics Concern   • None   Social History Narrative   • None     Social Determinants of Health     Financial Resource Strain: Not on file   Food Insecurity: Not on file   Transportation Needs: Not on file   Physical Activity: Not on file   Stress: Not on file   Social Connections: Not on file   Intimate Partner Violence: Not on file   Housing Stability: Not on file     Current Outpatient Medications on File Prior to Visit   Medication Sig   • albuterol (ProAir HFA) 90 mcg/act inhaler Inhale 2 puffs every 6 (six) hours as needed for wheezing   • Budeson-Glycopyrrol-Formoterol (Breztri Aerosphere) 160-9-4 8 MCG/ACT AERO Inhale 2 puffs Rinse mouth after use  • escitalopram (LEXAPRO) 10 mg tablet Take 10 mg by mouth daily   • levothyroxine 75 mcg tablet Take one tablet daily on empty stomach 1 hour before breakfast and 4 hours apart from calcium and vitamin D supplementation   • phentermine 37 5 MG capsule Take 1 capsule (37 5 mg total) by mouth every morning   • rosuvastatin (CRESTOR) 10 MG tablet Take 1 tablet (10 mg total) by mouth daily   • [DISCONTINUED] budesonide (Pulmicort Flexhaler) 90 MCG/ACT inhaler Inhale 1 puff 2 (two) times a day Rinse mouth after use  (Patient not taking: Reported on 10/10/2022)     Allergies   Allergen Reactions   • Sulfa Antibiotics Hives and Rash       There is no immunization history on file for this patient      Objective     /84 (BP Location: Right arm, Patient Position: Sitting, Cuff Size: Standard)   Pulse 78   Temp 97 6 °F (36 4 °C) (Tympanic)   Resp 16   Ht 5' 4" (1 626 m)   Wt 69 8 kg (153 lb 12 8 oz)   SpO2 97%   BMI 26 40 kg/m²     Physical Exam  Vitals and nursing note reviewed  Constitutional:       Appearance: She is well-developed  HENT:      Head: Normocephalic and atraumatic  Eyes:      Pupils: Pupils are equal, round, and reactive to light  Cardiovascular:      Rate and Rhythm: Normal rate and regular rhythm  Heart sounds: Normal heart sounds  Pulmonary:      Effort: Pulmonary effort is normal       Breath sounds: Normal breath sounds  Abdominal:      General: Bowel sounds are normal       Palpations: Abdomen is soft  Musculoskeletal:         General: Normal range of motion  Cervical back: Normal range of motion and neck supple  Lymphadenopathy:      Cervical: No cervical adenopathy  Skin:     General: Skin is warm  Neurological:      Mental Status: She is alert and oriented to person, place, and time  Cranial Nerves: No cranial nerve deficit         Marlin Silverman MD

## 2022-12-06 NOTE — ASSESSMENT & PLAN NOTE
Not well controlled  Discussed with patient to continue on her Crestor  She was told to check her blood work  Continue to follow low-fat diet and regular exercise

## 2022-12-06 NOTE — ASSESSMENT & PLAN NOTE
She was given prescription for diclofenac 75 mg 1 tablet twice a day  Discussed about possible side effects    Referral to hand physical therapy

## 2022-12-08 ENCOUNTER — VBI (OUTPATIENT)
Dept: ADMINISTRATIVE | Facility: OTHER | Age: 57
End: 2022-12-08

## 2022-12-13 ENCOUNTER — VBI (OUTPATIENT)
Dept: ADMINISTRATIVE | Facility: OTHER | Age: 57
End: 2022-12-13

## 2022-12-13 ENCOUNTER — OFFICE VISIT (OUTPATIENT)
Dept: URGENT CARE | Facility: CLINIC | Age: 57
End: 2022-12-13

## 2022-12-13 VITALS — RESPIRATION RATE: 18 BRPM | HEART RATE: 85 BPM | OXYGEN SATURATION: 99 % | TEMPERATURE: 97.9 F

## 2022-12-13 DIAGNOSIS — J20.9 ACUTE BRONCHITIS, UNSPECIFIED ORGANISM: Primary | ICD-10-CM

## 2022-12-13 DIAGNOSIS — R05.1 ACUTE COUGH: ICD-10-CM

## 2022-12-13 RX ORDER — AZITHROMYCIN 250 MG/1
TABLET, FILM COATED ORAL
Qty: 6 TABLET | Refills: 0 | Status: SHIPPED | OUTPATIENT
Start: 2022-12-13 | End: 2022-12-17

## 2022-12-13 RX ORDER — BUDESONIDE, GLYCOPYRROLATE, AND FORMOTEROL FUMARATE 160; 9; 4.8 UG/1; UG/1; UG/1
2 AEROSOL, METERED RESPIRATORY (INHALATION) DAILY
Qty: 10.7 G | Refills: 0 | Status: SHIPPED | OUTPATIENT
Start: 2022-12-13

## 2022-12-13 RX ORDER — PREDNISONE 20 MG/1
40 TABLET ORAL DAILY
Qty: 10 TABLET | Refills: 0 | Status: SHIPPED | OUTPATIENT
Start: 2022-12-13 | End: 2022-12-18

## 2022-12-13 NOTE — PATIENT INSTRUCTIONS
Start azithromycin as prescribed  Take prednisone as prescribed  Vitamin D3 2000 IU daily  Vitamin C 1000mg twice per day  Multivitamin daily  Fluids and rest  Over the counter cold medication as needed (EX: Coricidin HBP, tylenol/motrin)  Follow up with PCP in 3-5 days  Proceed to ER if symptoms worsen  Eat yogurt with live and active cultures and/or take a probiotic at least 3 hours before or after antibiotic dose  Monitor stool for diarrhea and/or blood  If this occurs, contact primary care doctor ASAP

## 2022-12-13 NOTE — PROGRESS NOTES
[facility]        NAME: Tara Jones is a 65 y o  female  : 1965    MRN: [id number]  DATE: 2022  TIME: 1:26 PM    Assessment and Plan   Acute bronchitis, unspecified organism [J20 9]  1  Acute bronchitis, unspecified organism  azithromycin (ZITHROMAX) 250 mg tablet    predniSONE 20 mg tablet    Budeson-Glycopyrrol-Formoterol (Breztri Aerosphere) 160-9-4 8 MCG/ACT AERO      2  Acute cough  Budeson-Glycopyrrol-Formoterol (Breztri Aerosphere) 160-9-4 8 MCG/ACT AERO            Patient Instructions     Start azithromycin as prescribed  Take prednisone as prescribed  Vitamin D3 2000 IU daily  Vitamin C 1000mg twice per day  Multivitamin daily  Fluids and rest  Over the counter cold medication as needed (EX: Coricidin HBP, tylenol/motrin)  Follow up with PCP in 3-5 days  Proceed to ER if symptoms worsen  Eat yogurt with live and active cultures and/or take a probiotic at least 3 hours before or after antibiotic dose  Monitor stool for diarrhea and/or blood  If this occurs, contact primary care doctor ASAP  Chief Complaint     Chief Complaint   Patient presents with   • Nasal Congestion     Nasal congestion, loss of voice, sore throat and shortness of breath for over a week  History of Present Illness       Patient is a 60-year-old female with significant PMH of asthma presenting in the clinic today for cold symptoms x1 week  Admits to active cough with discolored sputum, sore throat, congestion, loss of voice  Denies fever, chills, chest pain, SOB, abdominal pain, N/V/D, rhinorrhea, sinus pain/pressure, headache, body aches  Admits the use of Advil and albuterol for symptom management  Patient states she is not vaccinated for COVID or flu  Positive sick contacts at work  Patient mentions taking at home rapid COVID test which was negative  Review of Systems   Review of Systems   Constitutional: Negative for chills, fatigue and fever     HENT: Positive for congestion, sore throat and voice change  Negative for ear pain, postnasal drip, rhinorrhea, sinus pressure and sinus pain  Respiratory: Positive for cough  Negative for shortness of breath  Cardiovascular: Negative for chest pain  Gastrointestinal: Negative for abdominal pain, diarrhea, nausea and vomiting  Musculoskeletal: Negative for myalgias  Skin: Negative for rash  Neurological: Negative for headaches           Current Medications       Current Outpatient Medications:   •  azithromycin (ZITHROMAX) 250 mg tablet, Take 2 tablets today then 1 tablet daily x 4 days, Disp: 6 tablet, Rfl: 0  •  Budeson-Glycopyrrol-Formoterol (Breztri Aerosphere) 160-9-4 8 MCG/ACT AERO, Inhale 2 puffs daily Rinse mouth after use , Disp: 10 7 g, Rfl: 0  •  predniSONE 20 mg tablet, Take 2 tablets (40 mg total) by mouth daily for 5 days, Disp: 10 tablet, Rfl: 0  •  albuterol (ProAir HFA) 90 mcg/act inhaler, Inhale 2 puffs every 6 (six) hours as needed for wheezing, Disp: 18 g, Rfl: 2  •  diclofenac (VOLTAREN) 75 mg EC tablet, Take 1 tablet (75 mg total) by mouth 2 (two) times a day, Disp: 60 tablet, Rfl: 1  •  escitalopram (LEXAPRO) 10 mg tablet, Take 10 mg by mouth daily, Disp: , Rfl:   •  levothyroxine 75 mcg tablet, Take one tablet daily on empty stomach 1 hour before breakfast and 4 hours apart from calcium and vitamin D supplementation, Disp: 90 tablet, Rfl: 3  •  phentermine 37 5 MG capsule, Take 1 capsule (37 5 mg total) by mouth every morning, Disp: 30 capsule, Rfl: 0  •  rosuvastatin (CRESTOR) 10 MG tablet, Take 1 tablet (10 mg total) by mouth daily, Disp: 90 tablet, Rfl: 3    Current Allergies     Allergies as of 12/13/2022 - Reviewed 12/13/2022   Allergen Reaction Noted   • Sulfa antibiotics Hives and Rash 01/06/2017            The following portions of the patient's history were reviewed and updated as appropriate: allergies, current medications, past family history, past medical history, past social history, past surgical history and problem list      Past Medical History:   Diagnosis Date   • Anemia     Post gastric bypass surgery   • Asthma     Allergy induced   • Disease of thyroid gland        Past Surgical History:   Procedure Laterality Date   • CHOLECYSTECTOMY      When I had weight loss surgery   • HERNIA REPAIR      Belly Button   • REDUCTION MAMMAPLASTY Bilateral 1985       Family History   Problem Relation Age of Onset   • Cancer Mother    • Hypertension Father         Has a pacemaker as well  • Asthma Sister    • No Known Problems Daughter    • No Known Problems Maternal Grandmother    • No Known Problems Paternal Grandmother    • No Known Problems Paternal Aunt          Medications have been verified  Objective   Pulse 85   Temp 97 9 °F (36 6 °C)   Resp 18   SpO2 99%        Physical Exam     Physical Exam  Vitals reviewed  Constitutional:       General: She is not in acute distress  Appearance: Normal appearance  She is normal weight  She is not ill-appearing  HENT:      Head: Normocephalic  Right Ear: Hearing, tympanic membrane, ear canal and external ear normal  No middle ear effusion  There is no impacted cerumen  Tympanic membrane is not erythematous or bulging  Left Ear: Hearing, tympanic membrane, ear canal and external ear normal   No middle ear effusion  There is no impacted cerumen  Tympanic membrane is not erythematous or bulging  Nose: Congestion present  No rhinorrhea  Right Sinus: No maxillary sinus tenderness or frontal sinus tenderness  Left Sinus: No maxillary sinus tenderness or frontal sinus tenderness  Mouth/Throat:      Lips: Pink  Mouth: Mucous membranes are moist       Pharynx: Oropharynx is clear  Uvula midline  Posterior oropharyngeal erythema present  No pharyngeal swelling or oropharyngeal exudate  Tonsils: No tonsillar exudate or tonsillar abscesses  1+ on the right  1+ on the left     Eyes:      General:         Right eye: No discharge  Left eye: No discharge  Conjunctiva/sclera: Conjunctivae normal    Cardiovascular:      Rate and Rhythm: Normal rate and regular rhythm  Pulses: Normal pulses  Heart sounds: Normal heart sounds  No murmur heard  No friction rub  No gallop  Pulmonary:      Effort: Pulmonary effort is normal  No respiratory distress  Breath sounds: Rhonchi present  No wheezing or rales  Musculoskeletal:      Cervical back: Normal range of motion and neck supple  No tenderness  Lymphadenopathy:      Cervical: No cervical adenopathy  Skin:     General: Skin is warm  Neurological:      Mental Status: She is alert     Psychiatric:         Mood and Affect: Mood normal          Behavior: Behavior normal

## 2023-01-14 NOTE — PROGRESS NOTES
Pulmonary Follow Up Note   Carlos House 65 y o  female MRN: [id number]  1/16/2023    Assessment:  Post viral bronchitis  Possible reversible airway disease  · Could have an element of reversible airway disease/asthma, Hx asthma as a child/used to be on allergy shots, ACT score 25/25  · Around last spring/early summer  · Try different medicines only Breztri helped  · + COVID-19 PCR in April/May with similar symptoms  · Had recurrence in July, suspect viral URI  · Negative work-up included chest x-ray, PFT/normal DLCO    Plan:  · Of Breztri since Thanksgiving, given 1 more sample to be used if had recurrence of symptoms  · Otherwise remains without maintenance treatment no need for bronchoprovocation test at this point  · Follow-up in 1 year  · Counseled about avoiding allergens exposure    Return in about 1 year (around 1/16/2024)  History of Present Illness     Follow up for: Asthma/bronchitis    Background:  Per initial consult note    "65 y o  female with a h/o hypothyroidism, seasonal allergies      Noted URI/wheezing, chest congestion, and rhinitis symptoms around July  Given 2 rounds of steroids with no significant improvement as well as p r n  Albuterol      Reports having positive COVID-19 PCR with nasal congestion/fatigue/and cough in April/May with positive exposure to sick contacts  Symptoms improved after that however had recurrence of the symptoms in July, tested negative for COVID-19 at that time  Also tried Pulmicort inhalers, no improvement of symptoms even with albuterol nebs during the PFT  Chest x-ray was normal   Reports no changes in environment, no new carpets, moving, pains      Recently given Breztri 2 weeks ago, with near resolution of all of the symptoms which she is currently using on daily basis      Hx of seasonal allergies as a child, used to be on allergy shots from age of 12 until age of 6   Noted allergic rhinitis with seasonal variation, or with exposure to grass, pet danders/care  No formal history of asthma, no ED visits, frequent steroid use, or hospitalization "    10/10/2022 visit-clear lung fields, normal PFT  Advised to stop AutoESL  Not interested in methacholine challenge test   Continue PRN albuterol    Interval History  Since last seen, had mild URI-like symptoms once  Prescribed inhalers did not help a lot, does not recall the name    Stopped using Breztri around the Thanksgiving time  No recurrence of wheezing, cough, or rhinitis symptoms  Review of Systems  As per hpi, all other systems reviewed and were negative    Studies:  Imaging and other studies: I have personally reviewed pertinent films in PACS  Chest x-ray 7/19/2022-clear lung fields     Pulmonary function testing:   PFT 09/09/2022-ratio 78%, FEV1 2 58 L/98%, FVC 3 32 L/101%  No BD response  TLC 99%, RV 97%, DLCO 112%      EKG, Pathology, and Other Studies: I have personally reviewed pertinent reports             Past medical, surgical, social and family histories reviewed  Medications/Allergies: Reviewed      Vitals: Blood pressure 128/74, pulse 80, temperature 97 5 °F (36 4 °C), temperature source Temporal, height 5' 4" (1 626 m), weight 70 8 kg (156 lb), SpO2 96 %  Body mass index is 26 78 kg/m²  Oxygen Therapy  SpO2: 96 %  Oxygen Therapy: None (Room air)      Physical Exam  Body mass index is 26 78 kg/m²     Gen: not in acute distress,   Neck/Eyes: supple, no adenopathy, PERRL  Ear: normal appearance, no significant hearing impairment  Nose:  normal nasal mucosa, no drainage  Mouth:  unremarkable/normal appearance of lips, teeth and gums  Oropharynx: mucosa is moist, no focal lesions or erythema  Salivary glands: soft nontender  Chest: normal respiratory efforts, clear breath sounds bilaterally  CV: RRR, no murmurs appreciated, no edema  Abdomen: soft, non tender  Extremities:  No observed deformity   Skin: unremarkable  Neuro: AAO X3, no focal motor deficit        Labs:  Lab Results Component Value Date    WBC 6 81 08/01/2022    HGB 13 4 08/01/2022    HCT 41 4 08/01/2022    MCV 99 (H) 08/01/2022     08/01/2022     Lab Results   Component Value Date    CALCIUM 9 2 08/01/2022    K 3 4 (L) 08/01/2022    CO2 26 08/01/2022     (H) 08/01/2022    BUN 12 08/01/2022    CREATININE 0 79 08/01/2022     No results found for: IGE  Lab Results   Component Value Date    ALT 35 08/01/2022    AST 23 08/01/2022    ALKPHOS 95 08/01/2022           Portions of the record may have been created with voice recognition software  Occasional wrong word or "sound a like" substitutions may have occurred due to the inherent limitations of voice recognition software  Read the chart carefully and recognize, using context, where substitutions have occurred    Claudene Jenny, M D [facility]

## 2023-01-16 ENCOUNTER — OFFICE VISIT (OUTPATIENT)
Dept: PULMONOLOGY | Facility: CLINIC | Age: 58
End: 2023-01-16

## 2023-01-16 VITALS
HEART RATE: 80 BPM | WEIGHT: 156 LBS | TEMPERATURE: 97.5 F | BODY MASS INDEX: 26.63 KG/M2 | OXYGEN SATURATION: 96 % | DIASTOLIC BLOOD PRESSURE: 74 MMHG | SYSTOLIC BLOOD PRESSURE: 128 MMHG | HEIGHT: 64 IN

## 2023-01-16 DIAGNOSIS — J45.30 MILD PERSISTENT ASTHMA WITHOUT COMPLICATION: Primary | ICD-10-CM

## 2023-01-17 ENCOUNTER — OFFICE VISIT (OUTPATIENT)
Dept: FAMILY MEDICINE CLINIC | Facility: CLINIC | Age: 58
End: 2023-01-17

## 2023-01-17 VITALS
TEMPERATURE: 97.1 F | OXYGEN SATURATION: 95 % | SYSTOLIC BLOOD PRESSURE: 120 MMHG | RESPIRATION RATE: 16 BRPM | HEART RATE: 80 BPM | HEIGHT: 64 IN | BODY MASS INDEX: 25.78 KG/M2 | WEIGHT: 151 LBS | DIASTOLIC BLOOD PRESSURE: 70 MMHG

## 2023-01-17 DIAGNOSIS — E03.9 HYPOTHYROIDISM, UNSPECIFIED TYPE: ICD-10-CM

## 2023-01-17 DIAGNOSIS — E78.49 OTHER HYPERLIPIDEMIA: Primary | ICD-10-CM

## 2023-01-17 DIAGNOSIS — J45.30 MILD PERSISTENT ASTHMA WITHOUT COMPLICATION: ICD-10-CM

## 2023-01-17 DIAGNOSIS — R74.8 ELEVATED LIVER ENZYMES: ICD-10-CM

## 2023-01-17 DIAGNOSIS — G56.03 BILATERAL CARPAL TUNNEL SYNDROME: ICD-10-CM

## 2023-01-17 RX ORDER — PHENTERMINE HYDROCHLORIDE 37.5 MG/1
37.5 CAPSULE ORAL EVERY MORNING
Qty: 30 CAPSULE | Refills: 0 | Status: CANCELLED | OUTPATIENT
Start: 2023-01-17

## 2023-01-17 RX ORDER — DICLOFENAC SODIUM 75 MG/1
75 TABLET, DELAYED RELEASE ORAL 2 TIMES DAILY
Qty: 60 TABLET | Refills: 1 | Status: SHIPPED | OUTPATIENT
Start: 2023-01-17

## 2023-01-17 RX ORDER — LEVOTHYROXINE SODIUM 88 UG/1
88 TABLET ORAL
Qty: 90 TABLET | Refills: 1 | Status: SHIPPED | OUTPATIENT
Start: 2023-01-17

## 2023-01-17 RX ORDER — PHENTERMINE HYDROCHLORIDE 37.5 MG/1
37.5 CAPSULE ORAL EVERY MORNING
Qty: 30 CAPSULE | Refills: 0 | Status: SHIPPED | OUTPATIENT
Start: 2023-01-17

## 2023-01-17 NOTE — ASSESSMENT & PLAN NOTE
Most probably secondary to fatty liver  Discussed about low-fat diet and regular exercise  We will get repeat her blood work in 3 months  I will consider liver ultrasound if patient is agreeable

## 2023-01-17 NOTE — ASSESSMENT & PLAN NOTE
Her cholesterol came back very elevated  She was told to start on her Crestor  Discussed about low-fat diet and exercise  Discussed about possible side effects of medication  Continue to repeat her fasting lipid profile and CMP in 3 months

## 2023-01-17 NOTE — PROGRESS NOTES
Name: Gigi Moy      : 1965      MRN: [id number]  Encounter Provider: Efraín Escobar MD  Encounter Date: 2023   Encounter department: [address] [place] [address]  Other hyperlipidemia  Assessment & Plan:  Her cholesterol came back very elevated  She was told to start on her Crestor  Discussed about low-fat diet and exercise  Discussed about possible side effects of medication  Continue to repeat her fasting lipid profile and CMP in 3 months  Orders:  -     CBC and differential; Future  -     Comprehensive metabolic panel; Future  -     Lipid Panel with Direct LDL reflex; Future  -     TSH, 3rd generation with Free T4 reflex; Future    2  Bilateral carpal tunnel syndrome  -     diclofenac (VOLTAREN) 75 mg EC tablet; Take 1 tablet (75 mg total) by mouth 2 (two) times a day    3  Hypothyroidism, unspecified type  Assessment & Plan:  TSH came back elevated  Recommend to increase levothyroxine to 88 mcg daily  Was discussed for possible side effect  I am going to repeat blood work in 2 to 3 months  Orders:  -     levothyroxine 88 mcg tablet; Take 1 tablet (88 mcg total) by mouth daily in the early morning Take one tablet daily on empty stomach 1 hour before breakfast and 4 hours apart from calcium and vitamin D supplementation    4  BMI 25 0-25 9,adult  Assessment & Plan:  Improving  It was discussed about low-carb diet and regular exercise  Continue on phentermine  She was given refill  Orders:  -     phentermine 37 5 MG capsule; Take 1 capsule (37 5 mg total) by mouth every morning    5  Elevated liver enzymes  Assessment & Plan:  Most probably secondary to fatty liver  Discussed about low-fat diet and regular exercise  We will get repeat her blood work in 3 months  I will consider liver ultrasound if patient is agreeable  6  Mild persistent asthma without complication  Assessment & Plan:  Continue on inhaler    Continue to follow-up with pulmonologist              Subjective     She is here today for follow-up multiple medical problems  She has been taking her medications  She denies any side effects from her medications  Her blood work came back showing elevated cholesterol  She did not start Crestor yet  Her TSH came back elevated  She is on levothyroxine 75 mcg daily  Review of Systems   Constitutional: Negative for chills and fever  HENT: Negative for trouble swallowing  Eyes: Negative for visual disturbance  Respiratory: Negative for cough and shortness of breath  Cardiovascular: Negative for chest pain, palpitations and leg swelling  Gastrointestinal: Negative for abdominal pain, constipation and diarrhea  Endocrine: Negative for cold intolerance and heat intolerance  Genitourinary: Negative for difficulty urinating and dysuria  Musculoskeletal: Negative for gait problem  Skin: Negative for rash  Neurological: Negative for dizziness, tremors, seizures and headaches  Hematological: Negative for adenopathy  Psychiatric/Behavioral: Negative for behavioral problems  Past Medical History:   Diagnosis Date   • Anemia     Post gastric bypass surgery   • Asthma     Allergy induced   • Disease of thyroid gland      Past Surgical History:   Procedure Laterality Date   • CHOLECYSTECTOMY      When I had weight loss surgery   • HERNIA REPAIR      Belly Button   • REDUCTION MAMMAPLASTY Bilateral 1985     Family History   Problem Relation Age of Onset   • Cancer Mother    • Hypertension Father         Has a pacemaker as well     • Asthma Sister    • No Known Problems Daughter    • No Known Problems Maternal Grandmother    • No Known Problems Paternal Grandmother    • No Known Problems Paternal Aunt      Social History     Socioeconomic History   • Marital status: /Civil Union     Spouse name: None   • Number of children: None   • Years of education: None   • Highest education level: None   Occupational History   • None   Tobacco Use   • Smoking status: Never   • Smokeless tobacco: Never   Vaping Use   • Vaping Use: Never used   Substance and Sexual Activity   • Alcohol use: Not Currently   • Drug use: Not Currently   • Sexual activity: Yes     Partners: Male     Birth control/protection: Post-menopausal   Other Topics Concern   • None   Social History Narrative   • None     Social Determinants of Health     Financial Resource Strain: Not on file   Food Insecurity: Not on file   Transportation Needs: Not on file   Physical Activity: Not on file   Stress: Not on file   Social Connections: Not on file   Intimate Partner Violence: Not on file   Housing Stability: Not on file     Current Outpatient Medications on File Prior to Visit   Medication Sig   • albuterol (ProAir HFA) 90 mcg/act inhaler Inhale 2 puffs every 6 (six) hours as needed for wheezing   • Budeson-Glycopyrrol-Formoterol (Breztri Aerosphere) 160-9-4 8 MCG/ACT AERO Inhale 2 puffs daily Rinse mouth after use  • escitalopram (LEXAPRO) 10 mg tablet Take 10 mg by mouth daily   • [DISCONTINUED] diclofenac (VOLTAREN) 75 mg EC tablet Take 1 tablet (75 mg total) by mouth 2 (two) times a day   • [DISCONTINUED] levothyroxine 75 mcg tablet Take one tablet daily on empty stomach 1 hour before breakfast and 4 hours apart from calcium and vitamin D supplementation   • [DISCONTINUED] phentermine 37 5 MG capsule Take 1 capsule (37 5 mg total) by mouth every morning   • rosuvastatin (CRESTOR) 10 MG tablet Take 1 tablet (10 mg total) by mouth daily (Patient not taking: Reported on 1/16/2023)   • [DISCONTINUED] budesonide (Pulmicort Flexhaler) 90 MCG/ACT inhaler Inhale 1 puff 2 (two) times a day Rinse mouth after use  (Patient not taking: Reported on 10/10/2022)     Allergies   Allergen Reactions   • Sulfa Antibiotics Hives and Rash       There is no immunization history on file for this patient      Objective     /70 (BP Location: Left arm, Patient Position: Sitting, Cuff Size: Adult)   Pulse 80   Temp (!) 97 1 °F (36 2 °C) (Tympanic)   Resp 16   Ht 5' 4" (1 626 m)   Wt 68 5 kg (151 lb)   SpO2 95%   BMI 25 92 kg/m²     Physical Exam  Vitals and nursing note reviewed  Constitutional:       Appearance: She is well-developed  HENT:      Head: Normocephalic and atraumatic  Eyes:      Pupils: Pupils are equal, round, and reactive to light  Cardiovascular:      Rate and Rhythm: Normal rate and regular rhythm  Heart sounds: Normal heart sounds  Pulmonary:      Effort: Pulmonary effort is normal       Breath sounds: Normal breath sounds  Abdominal:      General: Bowel sounds are normal       Palpations: Abdomen is soft  Musculoskeletal:         General: Normal range of motion  Cervical back: Normal range of motion and neck supple  Lymphadenopathy:      Cervical: No cervical adenopathy  Skin:     General: Skin is warm  Neurological:      Mental Status: She is alert and oriented to person, place, and time  Cranial Nerves: No cranial nerve deficit         Satya Schaffer MD

## 2023-01-17 NOTE — ASSESSMENT & PLAN NOTE
Improving  It was discussed about low-carb diet and regular exercise  Continue on phentermine  She was given refill

## 2023-01-17 NOTE — ASSESSMENT & PLAN NOTE
TSH came back elevated  Recommend to increase levothyroxine to 88 mcg daily  Was discussed for possible side effect  I am going to repeat blood work in 2 to 3 months

## 2023-01-27 ENCOUNTER — TELEPHONE (OUTPATIENT)
Dept: FAMILY MEDICINE CLINIC | Facility: CLINIC | Age: 58
End: 2023-01-27

## 2023-01-27 NOTE — TELEPHONE ENCOUNTER
Received fax from [facility] requesting additional dx codes for ferritin, iron,transferrin   Dx faxed back [phone]   anemia

## 2023-02-15 NOTE — TELEPHONE ENCOUNTER
Last seen 1/27/23 and last note said to return in 3 months   I copied past refills into chart from [facility] website and sent for provider approval          1 Ivana Bergeron 1965 [address] [facility] [place]           Search Criteria    NAME DATE OF BIRTH DATE RANGE   Myriam Gonsalves 14- [birth year]- To 02-     REQUESTER NAME REQUESTED DATE   Zohreh Ramos Wed Feb 15 2023 14:30:01 GMT-0500 [place] Standard Time)     Summary  Prescriptions  6  Prescribers  1  Pharmacies  1  View Map  Drug Classes  Benzodiazepines  0  Stimulants  6  Opioids  0  Muscle Relaxants  0  Opioid Dosage  Total MME for Active Prescriptions  0    Average MME  0 00  MME Graph   MME Calculator     • Prescriptions  • Notifications    • Prescribers  • Pharmacies  • MME Graph    [] [place] Drug Categories:     [] Stimulants      Showentries  Search:  PATIENT ID PRESCRIPTION # FILLED WRITTEN DRUG LABEL QTY DAYS STRENGTH MME** PRESCRIBER PHARMACY PAYMENT REFILL #/AUTH STATE DETAIL   1 [id number] 01/20/2023 01/17/2023 Phentermine Hcl (Capsule) 30 0 30 37 5 MG NA [facility](MD) [address] [facility] [address] [facility]    1 [id number] 12/21/2022 11/22/2022 Phentermine Hcl (Capsule) 30 0 30 37 5 MG NA [facility](MD) [address] [facility] 0 [place]    1 [id number] 11/19/2022 10/21/2022 Phentermine Hcl (Capsule) 30 0 30 37 5 MG NA WASSIM(MD) [address] [facility]    1 [id number] 10/19/2022 10/04/2022 Phentermine Hcl (Capsule) 30 0 30 37 5 MG NA WASSIM(MD) [address] 0 / 0 [place]    1 [id number] 09/09/2022 09/06/2022 Phentermine Hcl (Capsule) 30 0 30 37 5 MG NA WASSIM(MD) [address] 0 / 0 [place]    1 [id number] 08/02/2022 08/02/2022 Phentermine Hcl (Capsule) 30 0 30 37 5 MG NA WASSIM(MD) [facility] [employer] 0 / 0 [place]    Showing 1 to 6 of 6 entries  [id number]     * Per CDC guidance, the conversion factors and associated daily morphine milligram equivalents for drugs prescribed as part of medication-assisted treatment for opioid use disorder should not be used to benchmark against dosage thresholds meant for opioids prescribed for pain  Report Disclaimer:  Information from the [address] [employer] (PDMP) database is protected health information and any information accessed must be treated as confidential  Any person who knowingly or intentionally makes an unauthorized disclosure of information from the [address] will be subject to civil and criminal penalties as set forth in the ABC-MAP Act 2014-191, Act of Oct  27, 2014, P L  2912  The information in the [address] is submitted by pharmacies and may contain errors and omissions  Independent verification of prescription information with pharmacies and prescribers may sometimes be prudent or necessary    Educational content  CDC MME calculation guidelines  [place] Prescribing Guidelines  Letter Regarding the Misapplication of Prescribing Guidelines   Guide for Appropriate Tapering or Discontinuation of Long-Term Opioid Use   [id number]

## 2023-02-16 ENCOUNTER — HOSPITAL ENCOUNTER (OUTPATIENT)
Dept: MAMMOGRAPHY | Facility: HOSPITAL | Age: 58
End: 2023-02-16

## 2023-02-16 VITALS — HEIGHT: 64 IN | BODY MASS INDEX: 25.78 KG/M2 | WEIGHT: 151 LBS

## 2023-02-16 DIAGNOSIS — J20.8 ACUTE BRONCHITIS DUE TO OTHER SPECIFIED ORGANISMS: ICD-10-CM

## 2023-02-16 DIAGNOSIS — Z12.31 ENCOUNTER FOR SCREENING MAMMOGRAM FOR BREAST CANCER: ICD-10-CM

## 2023-02-16 RX ORDER — PHENTERMINE HYDROCHLORIDE 37.5 MG/1
37.5 CAPSULE ORAL EVERY MORNING
Qty: 30 CAPSULE | Refills: 0 | Status: SHIPPED | OUTPATIENT
Start: 2023-02-16

## 2023-02-16 RX ORDER — ALBUTEROL SULFATE 90 UG/1
AEROSOL, METERED RESPIRATORY (INHALATION)
Qty: 6.7 G | Refills: 2 | Status: SHIPPED | OUTPATIENT
Start: 2023-02-16

## 2023-02-21 ENCOUNTER — OFFICE VISIT (OUTPATIENT)
Dept: ENDOCRINOLOGY | Facility: CLINIC | Age: 58
End: 2023-02-21

## 2023-02-21 VITALS
WEIGHT: 149.2 LBS | HEIGHT: 64 IN | DIASTOLIC BLOOD PRESSURE: 78 MMHG | HEART RATE: 91 BPM | BODY MASS INDEX: 25.47 KG/M2 | SYSTOLIC BLOOD PRESSURE: 124 MMHG

## 2023-02-21 DIAGNOSIS — Z98.84 S/P BARIATRIC SURGERY: ICD-10-CM

## 2023-02-21 DIAGNOSIS — E66.3 OVERWEIGHT: ICD-10-CM

## 2023-02-21 DIAGNOSIS — R79.89 ELEVATED FERRITIN: ICD-10-CM

## 2023-02-21 DIAGNOSIS — E03.9 HYPOTHYROIDISM, UNSPECIFIED TYPE: Primary | ICD-10-CM

## 2023-02-21 DIAGNOSIS — R74.01 TRANSAMINITIS: ICD-10-CM

## 2023-02-21 DIAGNOSIS — E78.2 MIXED HYPERLIPIDEMIA: ICD-10-CM

## 2023-02-21 DIAGNOSIS — E55.9 VITAMIN D DEFICIENCY: ICD-10-CM

## 2023-02-21 DIAGNOSIS — M85.80 OSTEOPENIA, UNSPECIFIED LOCATION: ICD-10-CM

## 2023-02-21 NOTE — PROGRESS NOTES
Kostas Thakkar 65 y o  female MRN: [id number]    Encounter: [id number]      Assessment/Plan     1  Hypothyroidism - dose of levothyroxine increased to 88 mcg daily last month  Will plan to repeat thyroid function tests in 6-8 weeks with goal of maintaining biochemical euthyroidism  2  History of maria c-en-y surgery - Continue bariatric MVI plus calcium citrate  Follow up CMP and iPTH    3  Vit D deficiency - resolved  Vit D wnl on last labs  Will continue to monitor    4  Mixed hyperlipidemia - worsening  Agree with initiation of statin therapy  Has follow up studies for lipid panel to access response    5  Overweight - improving  6  Osteopenia - encouraged optimization of nutrition and activity as well as fall prevention  Next DXA Aug 2024    7  Elevated Ferritin - iron indices otherwise wnl  Ferritin has been persistently elevated  Agree with consideration for RUQ US, as well as other possible etiologies for this non-specific lab    8  Transaminitis - mild  Following with PCP, who will be repeating liver function tests with upcoming labs  Agree with plan to consider RUQ US in case of persistent elevation of LFTs    Orders Placed This Encounter   Procedures   • PTH, intact Lab Collect Lab Collect   • Phosphorus Lab Collect   • T4, free Lab Collect   • Ambulatory referral to Nutrition Services     RTC 6-mo    CC: Hypothyroidism, s/p RYGB    History of Present Illness     HPI:    Annette Barker returns today for follow up of hypothyroidism & s/p RYBG  Dalia reports feeling overall fair  She had a period over the holidays where she was very active and losing weight more effectively, although more recently her weight is starting to plateau  She remains on phentermine, prescribed by PCP  Her dose of levothyroxine was increased in January to 88 mcg daily from 75 mcg daily due to elevated TSH  She was also started on rosuvastatin 10 mg daily on account of elevated LDL       Dalia denies any hyper- or hypothyroid symptoms  She denies any compressive neck complaints  For s/p RYGB, Arti Davidson is now taking procare bariatric MVI once daily and calcium citrate 500 mg bid  Nutrition label for procare bariatric MVI:       Arti Davidson reports no history of kidney stones, fractures or falls  Menopause occurred in mid-40s  Patient reports family history of high cholesterol in both parents  Patient's mother may have had heart disease affecting her at young age, although not entirely sure  MGF  from heart attack (patient was young at the time, does not recall MGF age)  Review of Systems   Constitutional: Negative for diaphoresis and unexpected weight change  HENT: Negative for trouble swallowing and voice change  Eyes: Negative for photophobia and visual disturbance  Respiratory: Negative for shortness of breath  Cardiovascular: Negative for chest pain and palpitations  Gastrointestinal: Negative for nausea and vomiting  Endocrine: Negative for cold intolerance and heat intolerance  Genitourinary: Negative for flank pain  Neurological: Negative for tremors  All other systems reviewed and are negative  Historical Information   Past Medical History:   Diagnosis Date   • Anemia     Post gastric bypass surgery   • Asthma     Allergy induced   • Disease of thyroid gland      Past Surgical History:   Procedure Laterality Date   • CHOLECYSTECTOMY      When I had weight loss surgery   • HERNIA REPAIR      Belly Button   • REDUCTION MAMMAPLASTY Bilateral      Social History   Social History     Substance and Sexual Activity   Alcohol Use Not Currently     Social History     Substance and Sexual Activity   Drug Use Not Currently     Social History     Tobacco Use   Smoking Status Never   Smokeless Tobacco Never     Family History:   Family History   Problem Relation Age of Onset   • Cancer Mother    • Hypertension Father         Has a pacemaker as well     • Asthma Sister    • No Known Problems Daughter • No Known Problems Maternal Grandmother    • No Known Problems Paternal Grandmother    • No Known Problems Paternal Aunt        Meds/Allergies   Current Outpatient Medications   Medication Sig Dispense Refill   • albuterol (PROVENTIL HFA,VENTOLIN HFA) 90 mcg/act inhaler inhale 2 puffs every 6 hours if needed for wheezing 6 7 g 2   • Budeson-Glycopyrrol-Formoterol (Breztri Aerosphere) 160-9-4 8 MCG/ACT AERO Inhale 2 puffs daily Rinse mouth after use  10 7 g 0   • diclofenac (VOLTAREN) 75 mg EC tablet Take 1 tablet (75 mg total) by mouth 2 (two) times a day 60 tablet 1   • escitalopram (LEXAPRO) 10 mg tablet Take 10 mg by mouth daily     • levothyroxine 88 mcg tablet Take 1 tablet (88 mcg total) by mouth daily in the early morning Take one tablet daily on empty stomach 1 hour before breakfast and 4 hours apart from calcium and vitamin D supplementation 90 tablet 1   • phentermine 37 5 MG capsule Take 1 capsule (37 5 mg total) by mouth every morning 30 capsule 0   • rosuvastatin (CRESTOR) 10 MG tablet Take 1 tablet (10 mg total) by mouth daily 90 tablet 3     No current facility-administered medications for this visit  Allergies   Allergen Reactions   • Sulfa Antibiotics Hives and Rash       Objective   Vitals: Blood pressure 124/78, pulse 91, height 5' 4" (1 626 m), weight 67 7 kg (149 lb 3 2 oz)  Physical Exam  Vitals reviewed  Constitutional:       Appearance: Normal appearance  HENT:      Head: Normocephalic and atraumatic  Nose: Nose normal    Eyes:      General: No scleral icterus  Conjunctiva/sclera: Conjunctivae normal       Comments: Heterochromia    Neck:      Thyroid: No thyroid mass, thyromegaly or thyroid tenderness  Cardiovascular:      Rate and Rhythm: Normal rate and regular rhythm  Pulmonary:      Effort: Pulmonary effort is normal  No respiratory distress  Musculoskeletal:      Cervical back: Normal range of motion  Right lower leg: No edema        Left lower leg: No edema  Comments: No tendinous xanthomas on hands, achilles    Lymphadenopathy:      Cervical: No cervical adenopathy  Neurological:      General: No focal deficit present  Mental Status: She is alert  Psychiatric:         Mood and Affect: Mood normal          Behavior: Behavior normal          The history was obtained from the review of the chart, patient  Lab Results:      Ref Range & Units 1/16/23  8:43 AM   Glucose 65 - 99 mg/dL 94    BUN 7 - 25 mg/dL 14    Creatinine 0 40 - 1 10 mg/dL 0 88    Sodium 135 - 145 mmol/L 140    Potassium 3 5 - 5 2 mmol/L 4 2    Chloride 100 - 109 mmol/L 105    Carbon Dioxide 23 - 31 mmol/L 26    Calcium 8 5 - 10 1 mg/dL 9 5    Alkaline Phosphatase 35 - 120 U/L 117    Albumin 3 5 - 4 8 g/dL 4 0    Bilirubin, Total 0 2 - 1 0 mg/dL 0 4    Comment: Use of this assay is not recommended for patients undergoing treatment with eltrombopag due to the potential for falsely elevated results  Protein, Total 6 3 - 8 3 g/dL 6 9    AST <41 U/L 56 High     ALT <56 U/L 86 High     Anion Gap 3 - 11 9    eGFRcr >59 77       Ref Range & Units 1/16/23  8:43 AM   Thyroid Stimulating Hormone 0 36 - 3 74 uIU/mL 7 39 High       Ref Range & Units 1/16/23  8:43 AM   T4, Free 0 76 - 1 46 ng/dL 0 98         Ref Range & Units 1/16/23  8:43 AM   Iron 40 - 180 ug/dL 92    Transferrin 200 - 400 mg/dL 268    % Saturation 20 - 50 % 28    TIBC 260 - 430 ug/dL 329       Ref Range & Units 1/16/23  8:43 AM   Ferritin 10 - 291 ng/mL 901 High        Ref Range & Units 1/16/23  8:43 AM   Cholesterol <200 mg/dL 300 High     Triglyceride <150 mg/dL 191 High     Cholesterol, HDL, Direct >40 mg/dL 58    Cholesterol, Non-HDL <160 mg/dL 242 High     Comment: Note: For NCEP interpretive guidelines please refer to the Laboratory Handbook     CHOL/HDL Ratio  5 17    Comment: Relative Risk   1/2 Average Risk          3 27   Average Risk              4 44   2X Average Risk           7 05   3X Average Risk          11 04   Cholesterol, LDL, Calculated <130 mg/dL 204 High       Ref Range & Units 1/16/23  8:43 AM   Vitamin D, 25-OH 30 - 100 ng/mL 44          Lab Results   Component Value Date/Time    TSH 3RD MATTHEW 0 628 08/01/2022 01:50 PM    Free T4 1 02 08/01/2022 01:50 PM     Component      Latest Ref Rng & Units 8/1/2022   WBC      4 31 - 10 16 Thousand/uL 6 81   Red Blood Cell Count      3 81 - 5 12 Million/uL 4 19   Hemoglobin      11 5 - 15 4 g/dL 13 4   HCT      34 8 - 46 1 % 41 4   MCV      82 - 98 fL 99 (H)   MCH      26 8 - 34 3 pg 32 0   MCHC      31 4 - 37 4 g/dL 32 4   RDW      11 6 - 15 1 % 13 0   MPV      8 9 - 12 7 fL 9 9   Platelet Count      120 - 390 Thousands/uL 263   nRBC      /100 WBCs 0   Neutrophils %      43 - 75 % 52   Immat GRANS %      0 - 2 % 0   Lymphocytes Relative      14 - 44 % 32   Monocytes Relative      4 - 12 % 8   Eosinophils      0 - 6 % 8 (H)   Basophils Relative      0 - 1 % 0   Absolute Neutrophils      1 85 - 7 62 Thousands/µL 3 50   Immature Grans Absolute      0 00 - 0 20 Thousand/uL 0 01   Lymphocytes Absolute      0 60 - 4 47 Thousands/µL 2 15   Absolute Monocytes      0 17 - 1 22 Thousand/µL 0 57   Absolute Eosinophils      0 00 - 0 61 Thousand/µL 0 55   Basophils Absolute      0 00 - 0 10 Thousands/µL 0 03   Sodium      135 - 147 mmol/L 141   Potassium      3 5 - 5 3 mmol/L 3 4 (L)   Chloride      96 - 108 mmol/L 110 (H)   CO2      21 - 32 mmol/L 26   Anion Gap      4 - 13 mmol/L 5   BUN      5 - 25 mg/dL 12   Creatinine      0 60 - 1 30 mg/dL 0 79   GLUCOSE FASTING      65 - 99 mg/dL 83   Calcium      8 3 - 10 1 mg/dL 9 2   CORRECTED CALCIUM      8 3 - 10 1 mg/dL 9 7   AST      5 - 45 U/L 23   ALT      12 - 78 U/L 35   Alkaline Phosphatase      46 - 116 U/L 95   Total Protein      6 4 - 8 4 g/dL 6 7   Albumin      3 5 - 5 0 g/dL 3 4 (L)   TOTAL BILIRUBIN      0 20 - 1 00 mg/dL 0 42   eGFR      ml/min/1 73sq m 83   Cholesterol      See Comment mg/dL 247 (H)   Triglycerides See Comment mg/dL 228 (H)   HDL      >=50 mg/dL 49 (L)   LDL Calculated      0 - 100 mg/dL 152 (H)   Non-HDL Cholesterol      mg/dl 198   Vit D, 25-Hydroxy      30 0 - 100 0 ng/mL 45 3   Free T4      0 76 - 1 46 ng/dL 1 02   TSH 3RD GENERATON      0 450 - 4 500 uIU/mL 0 628   INSULIN, FASTING      3 0 - 25 0 mU/L 6 1     Imaging Studies:   ?  8/17/2022    CENTRAL  DXA SCAN     CLINICAL HISTORY:  27-year-old postmenopausal female  OTHER RISK FACTORS:  SSRI therapy      PHARMACOLOGIC THERAPY FOR OSTEOPOROSIS:  None      TECHNIQUE: Bone densitometry was performed using a GE Lunar Prodigy  bone densitometer  Regions of interest appear properly placed       COMPARISON: 2/20/2017      RESULTS:      LUMBAR SPINE  Level: L1-L4 :   BMD:  0 91  gm/cm2   T-score: -2 3         LEFT TOTAL HIP:   BMD: 0 798  gm/cm2   T-score: -1 7      LEFT FEMORAL NECK:   BMD: 0 725  gm/cm2   T score: -2 3      RIGHT TOTAL HIP:   BMD:  0 801  gm/cm2   T-score: -1 6     RIGHT FEMORAL NECK:   BMD:  0 721  gm/cm2    T score: -2 3         IMPRESSION:     1  Low bone mass (osteopenia)      2  Since a DXA study from 2/20/2017, there has been:  A  STATISTICALLY SIGNIFICANT INCREASE in bone mineral density of  0 017 g/cm2 (2 2)% in the hips            3  The 10 year risk of hip fracture is 1 4% with the 10 year risk of major osteoporotic fracture being 8 8% as calculated by the [facility]/WHO fracture risk assessment tool (FRAX)     4  The current NOF guidelines recommend treating patients with a T-score of -2 5 or less in the lumbar spine or hips, or in post-menopausal women and men over the age of 48 with low bone mass (osteopenia) and a FRAX 10 year risk score of >3% for hip   fracture and/or >20% for major osteoporotic fracture      5  The NOF recommends follow-up DXA in 1-2 years after initiating therapy for osteoporosis and every 2 years thereafter   More frequent evaluation is appropriate for patients with conditions associated with rapid bone loss, such as glucocorticoid   therapy  The interval between DXA screenings may be longer for individuals without major risk factors and initial T-score in the normal or upper low bone mass range  I have personally reviewed pertinent reports  Portions of the record may have been created with voice recognition software  Occasional wrong word or "sound a like" substitutions may have occurred due to the inherent limitations of voice recognition software  Read the chart carefully and recognize, using context, where substitutions have occurred

## 2023-03-06 ENCOUNTER — TELEPHONE (OUTPATIENT)
Dept: FAMILY MEDICINE CLINIC | Facility: CLINIC | Age: 58
End: 2023-03-06

## 2023-03-06 NOTE — TELEPHONE ENCOUNTER
We received fax the her iron panel needed additional dx including ferritin   I looked back and her ferritin level was elevated so I faxed back elevated ferritin level as dx code to [facility]

## 2023-03-20 RX ORDER — PHENTERMINE HYDROCHLORIDE 37.5 MG/1
37.5 CAPSULE ORAL EVERY MORNING
Qty: 30 CAPSULE | Refills: 0 | Status: SHIPPED | OUTPATIENT
Start: 2023-03-20

## 2023-03-20 NOTE — TELEPHONE ENCOUNTER
Refills have been requested for the following medications:         phentermine 37 5 MG capsule [Wassim Abosamra]     Preferred pharmacy: [address] [place] [address] [facility]  , [address]    Last seen 1/17/23     1 STEPHEN CHAN 1965 F 1994 [id number] ASTRID INGRAM           Search Criteria    Name Date of Birth Date Range   Jm Lozano [id number] 03- To 03-     Requester Name Requested Date   Kirk Rajput Mar 20 2023 09:03:03 GMT-0400 Jerralkei Regan Daylight Time)     Summary   Prescriptions  7  Prescribers  1  Pharmacies  1  View Map  Drug Classes   Benzodiazepines  0  Stimulants  7  Opioids  0  Muscle Relaxants  0  Opioid Dosage   Total MME for Active Prescriptions  0    Average MME  0 00  MME Graph   MME Calculator     • Prescriptions  • Notifications    • Prescribers  • Pharmacies  • MME Graph    [] PA   Drug Categories:      [] Stimulants       Show  entries  Search:  Patient Id Prescription #  Filled Written Drug Label Qty Days Strength MME** Prescriber Pharmacy Payment REFILL #/Auth State Detail   1  [id number] 02/17/2023 02/16/2023 Phentermine Hcl (Capsule)  30 0 30 37 5 MG NA WASSIM(MD) [address] 0 / 0 [place]    1  [id number] 01/20/2023 01/17/2023 Phentermine Hcl (Capsule)  30 0 30 37 5 MG NA WASSIM(MD) [address] 0 / 0 [place]    1  [id number] 12/21/2022 11/22/2022 Phentermine Hcl (Capsule)  30 0 30 37 5 MG NA WASSIM(MD) [address] 0 / 0 [place]    1  [id number] 11/19/2022  10/21/2022 Phentermine Hcl (Capsule)  30 0 30 37 5 MG NA WASSIM(MD) [address] 0 / 0 [place]    1  [id number] 10/19/2022  10/04/2022 Phentermine Hcl (Capsule)  30 0 30 37 5 MG NA WASSIM(MD) [address] 0 / 0 [place]    1  [id number] 09/09/2022 09/06/2022 Phentermine Hcl (Capsule)  30 0 30 37 5 MG NA MIMI) [facility], [facility]  Commercial Insurance 0 / 0 [place]    1  [id number] 08/02/2022 08/02/2022 Phentermine Hcl (Capsule)  30 0 30 37 5 MG NA MIMI) [address]

## 2023-03-24 ENCOUNTER — TELEPHONE (OUTPATIENT)
Dept: FAMILY MEDICINE CLINIC | Facility: CLINIC | Age: 58
End: 2023-03-24

## 2023-03-24 NOTE — TELEPHONE ENCOUNTER
----- Message from [address] Shaila Rice sent at 3/24/2023  2:59 AM EDT -----  Regarding: Neck/shoulder pain  Contact: [phone]  Good morning   The right side of my neck had been bothering me terribly the last few days/nights  This has happened many times in the past    Advil, Tylenol etc aren’t helping very much  Reche Art been waking me up at night and I’m not sure what to do to relieve the pain  The stretches I was taught last time aren’t helping with the discomfort  I think I was prescribed a muscle relaxer before which helped  What should I do?   Thank you,  Tavon Found

## 2023-04-03 DIAGNOSIS — E03.9 HYPOTHYROIDISM, UNSPECIFIED TYPE: ICD-10-CM

## 2023-04-03 RX ORDER — LEVOTHYROXINE SODIUM 88 UG/1
88 TABLET ORAL
Qty: 90 TABLET | Refills: 0 | Status: SHIPPED | OUTPATIENT
Start: 2023-04-03

## 2023-04-05 ENCOUNTER — VBI (OUTPATIENT)
Dept: ADMINISTRATIVE | Facility: OTHER | Age: 58
End: 2023-04-05

## 2023-04-18 PROBLEM — F41.9 ANXIETY: Status: ACTIVE | Noted: 2023-04-18

## 2023-04-22 DIAGNOSIS — J20.8 ACUTE BRONCHITIS DUE TO OTHER SPECIFIED ORGANISMS: ICD-10-CM

## 2023-04-24 RX ORDER — ALBUTEROL SULFATE 90 UG/1
2 AEROSOL, METERED RESPIRATORY (INHALATION) EVERY 4 HOURS PRN
Qty: 6.7 G | Refills: 0 | Status: SHIPPED | OUTPATIENT
Start: 2023-04-24

## 2023-04-25 ENCOUNTER — HOSPITAL ENCOUNTER (OUTPATIENT)
Dept: ULTRASOUND IMAGING | Facility: HOSPITAL | Age: 58
Discharge: HOME | End: 2023-04-25

## 2023-04-25 DIAGNOSIS — R74.8 ELEVATED LIVER ENZYMES: ICD-10-CM

## 2023-05-15 ENCOUNTER — OFFICE VISIT (OUTPATIENT)
Dept: FAMILY MEDICINE CLINIC | Facility: CLINIC | Age: 58
End: 2023-05-15

## 2023-05-15 VITALS
OXYGEN SATURATION: 99 % | DIASTOLIC BLOOD PRESSURE: 78 MMHG | HEIGHT: 64 IN | TEMPERATURE: 97.7 F | HEART RATE: 75 BPM | BODY MASS INDEX: 24.65 KG/M2 | WEIGHT: 144.4 LBS | SYSTOLIC BLOOD PRESSURE: 128 MMHG | RESPIRATION RATE: 16 BRPM

## 2023-05-15 DIAGNOSIS — J01.00 ACUTE NON-RECURRENT MAXILLARY SINUSITIS: Primary | ICD-10-CM

## 2023-05-15 RX ORDER — PREDNISONE 50 MG/1
50 TABLET ORAL DAILY
Qty: 5 TABLET | Refills: 0 | Status: SHIPPED | OUTPATIENT
Start: 2023-05-15 | End: 2023-05-20

## 2023-05-15 RX ORDER — AZITHROMYCIN 250 MG/1
TABLET, FILM COATED ORAL
Qty: 6 TABLET | Refills: 0 | Status: SHIPPED | OUTPATIENT
Start: 2023-05-15 | End: 2023-05-19

## 2023-05-15 NOTE — PROGRESS NOTES
Name: Jeff Afognak      : 1965      MRN: [id number]  Encounter Provider: Rose Martel MD  Encounter Date: 5/15/2023   Encounter department: [address] [place] [address]  Acute non-recurrent maxillary sinusitis  Assessment & Plan:  Prescribed azithromycin for antibiotics  Prescribed prednisone for inflammation  Follow up if symptoms do not improve or worsen    Orders:  -     azithromycin (ZITHROMAX) 250 mg tablet; Take 2 tablets today then 1 tablet daily x 4 days  -     predniSONE 50 mg tablet; Take 1 tablet (50 mg total) by mouth daily for 5 days           Subjective     S BRYSON  is a 65 y o  female with history of hypothyroidism, asthma, seasonal allergies, and HLD presenting today with complaints of sore throat, maxillary congestion, productive cough, chills, and fatigue x2 5 weeks  States everything has improved except for nasal congestion  Denies fevers, N/V, abdominal pain, SOB, or CP  Has lost the ability to taste and smell  Has been using her albuterol inhaler, advil, and tylenol with little relief  Took 3 COVID test at home which were negative  Had to cancel her hand surgery due to illness  No known sick contacts  States  is at home with similar symptoms  Review of Systems   Constitutional: Positive for chills and fatigue  Negative for fever  HENT: Positive for congestion, sinus pressure (Maxillary) and sore throat  Negative for ear discharge, ear pain, postnasal drip, rhinorrhea, sinus pain and trouble swallowing  Eyes: Negative for photophobia, redness and visual disturbance  Respiratory: Positive for cough  Negative for shortness of breath and wheezing  Cardiovascular: Negative for chest pain and palpitations  Gastrointestinal: Negative for abdominal pain, diarrhea, nausea and vomiting  Musculoskeletal: Positive for myalgias  Negative for arthralgias  Skin: Negative for rash     Neurological: Negative for dizziness, weakness and headaches  Past Medical History:   Diagnosis Date   • Anemia     Post gastric bypass surgery   • Asthma     Allergy induced   • Disease of thyroid gland      Past Surgical History:   Procedure Laterality Date   • CHOLECYSTECTOMY      When I had weight loss surgery   • HERNIA REPAIR      Belly Button   • REDUCTION MAMMAPLASTY Bilateral 1985     Family History   Problem Relation Age of Onset   • Cancer Mother    • Hypertension Father         Has a pacemaker as well  • Asthma Sister    • No Known Problems Daughter    • No Known Problems Maternal Grandmother    • No Known Problems Paternal Grandmother    • No Known Problems Paternal Aunt      Social History     Socioeconomic History   • Marital status: /Civil Union     Spouse name: None   • Number of children: None   • Years of education: None   • Highest education level: None   Occupational History   • None   Tobacco Use   • Smoking status: Never   • Smokeless tobacco: Never   Vaping Use   • Vaping Use: Never used   Substance and Sexual Activity   • Alcohol use: Not Currently   • Drug use: Not Currently   • Sexual activity: Yes     Partners: Male     Birth control/protection: Post-menopausal   Other Topics Concern   • None   Social History Narrative   • None     Social Determinants of Health     Financial Resource Strain: Not on file   Food Insecurity: Not on file   Transportation Needs: Not on file   Physical Activity: Not on file   Stress: Not on file   Social Connections: Not on file   Intimate Partner Violence: Not on file   Housing Stability: Not on file     Current Outpatient Medications on File Prior to Visit   Medication Sig   • albuterol (PROVENTIL HFA,VENTOLIN HFA) 90 mcg/act inhaler Inhale 2 puffs every 4 (four) hours as needed for wheezing   • Budeson-Glycopyrrol-Formoterol (Breztri Aerosphere) 160-9-4 8 MCG/ACT AERO Inhale 2 puffs daily Rinse mouth after use     • escitalopram (LEXAPRO) 10 mg tablet Take 1 tablet (10 mg total) by mouth daily "  • levothyroxine 75 mcg tablet Take 1 tablet (75 mcg total) by mouth daily in the early morning Take one tablet daily on empty stomach 1 hour before breakfast and 4 hours apart from calcium and vitamin D supplementation   • phentermine 37 5 MG capsule Take 1 capsule (37 5 mg total) by mouth every morning   • rosuvastatin (CRESTOR) 10 MG tablet Take 1 tablet (10 mg total) by mouth daily   • diclofenac (VOLTAREN) 75 mg EC tablet Take 1 tablet (75 mg total) by mouth 2 (two) times a day (Patient not taking: Reported on 4/18/2023)   • [DISCONTINUED] budesonide (Pulmicort Flexhaler) 90 MCG/ACT inhaler Inhale 1 puff 2 (two) times a day Rinse mouth after use  (Patient not taking: Reported on 10/10/2022)     Allergies   Allergen Reactions   • Sulfa Antibiotics Hives and Rash       There is no immunization history on file for this patient  Objective     /78 (BP Location: Left arm, Patient Position: Sitting, Cuff Size: Standard)   Pulse 75   Temp 97 7 °F (36 5 °C) (Tympanic)   Resp 16   Ht 5' 4\" (1 626 m)   Wt 65 5 kg (144 lb 6 4 oz)   SpO2 99%   BMI 24 79 kg/m²     Physical Exam  Vitals and nursing note reviewed  Constitutional:       Appearance: Normal appearance  HENT:      Head: Normocephalic and atraumatic  Right Ear: Ear canal and external ear normal       Left Ear: Ear canal and external ear normal       Ears:      Comments: Fluid behind bilateral TMs without erythema     Nose: Congestion present  Mouth/Throat:      Mouth: Mucous membranes are moist       Pharynx: Oropharynx is clear  Posterior oropharyngeal erythema present  No oropharyngeal exudate  Comments: No tonsillar hypertrophy  Eyes:      Extraocular Movements: Extraocular movements intact  Conjunctiva/sclera: Conjunctivae normal    Cardiovascular:      Rate and Rhythm: Normal rate and regular rhythm  Heart sounds: No murmur heard    Pulmonary:      Effort: Pulmonary effort is normal       Breath sounds: Normal " breath sounds  No wheezing, rhonchi or rales  Musculoskeletal:         General: No swelling  Cervical back: Normal range of motion and neck supple  Right lower leg: No edema  Left lower leg: No edema  Lymphadenopathy:      Cervical: No cervical adenopathy  Skin:     General: Skin is warm  Findings: No rash  Neurological:      Mental Status: She is alert and oriented to person, place, and time     Psychiatric:         Mood and Affect: Mood normal          Behavior: Behavior normal        Moe Irwin MD

## 2023-05-15 NOTE — ASSESSMENT & PLAN NOTE
Prescribed azithromycin for antibiotics  Prescribed prednisone for inflammation  Follow up if symptoms do not improve or worsen

## 2023-05-30 ENCOUNTER — OFFICE VISIT (OUTPATIENT)
Dept: FAMILY MEDICINE CLINIC | Facility: CLINIC | Age: 58
End: 2023-05-30

## 2023-05-30 VITALS
OXYGEN SATURATION: 97 % | WEIGHT: 145.6 LBS | BODY MASS INDEX: 24.86 KG/M2 | DIASTOLIC BLOOD PRESSURE: 70 MMHG | TEMPERATURE: 98.7 F | SYSTOLIC BLOOD PRESSURE: 116 MMHG | HEIGHT: 64 IN | HEART RATE: 67 BPM | RESPIRATION RATE: 14 BRPM

## 2023-05-30 DIAGNOSIS — E78.49 OTHER HYPERLIPIDEMIA: ICD-10-CM

## 2023-05-30 DIAGNOSIS — F41.9 ANXIETY: ICD-10-CM

## 2023-05-30 DIAGNOSIS — R74.8 ELEVATED LIVER ENZYMES: ICD-10-CM

## 2023-05-30 DIAGNOSIS — E03.8 OTHER SPECIFIED HYPOTHYROIDISM: ICD-10-CM

## 2023-05-30 RX ORDER — PHENTERMINE HYDROCHLORIDE 37.5 MG/1
37.5 TABLET ORAL DAILY
Qty: 30 TABLET | Refills: 0 | Status: SHIPPED | OUTPATIENT
Start: 2023-05-30

## 2023-05-30 NOTE — ASSESSMENT & PLAN NOTE
- patient stable and asymptomatic   Recent labs 05/26/2023 show improvement with AST 62 and    - continue to monitor  - consider liver ultrasound if levels worsen or do not improve

## 2023-05-30 NOTE — ASSESSMENT & PLAN NOTE
- patient initially taking phentermine every other day  - plan to switch to one-half phentermine tablet daily  - patient experiencing increased appetite and fatigue after last phentermine dose decrease  Educated that these are expected effects of decreased dose     - encouraged to exercise daily and continue will balanced diet

## 2023-05-30 NOTE — PROGRESS NOTES
"Name: Kateryna Chacko      : 1965      MRN: [id number]  Encounter Provider: Michelle Taylor MD  Encounter Date: 2023   Encounter department: [address] [place] [address]  BMI 24 0-24 9, adult  Assessment & Plan:  - patient initially taking phentermine every other day  - plan to switch to one-half phentermine tablet daily  - patient experiencing increased appetite and fatigue after last phentermine dose decrease  Educated that these are expected effects of decreased dose  - encouraged to exercise daily and continue will balanced diet    Orders:  -     phentermine (ADIPEX-P) 37 5 MG tablet; Take 1 tablet (37 5 mg total) by mouth in the morning    2  Other specified hypothyroidism  Assessment & Plan:  - patient with stable and asymptomatic on levothyroxine 75mcg daily  Continue current medications   - Recent labs 2023 show T4 0 81 and TSH 0 53  - continue to monitor    Orders:  -     TSH, 3rd generation with Free T4 reflex; Future    3  Elevated liver enzymes  Assessment & Plan:  - patient stable and asymptomatic  Recent labs 2023 show improvement with AST 62 and    - continue to monitor  - consider liver ultrasound if levels worsen or do not improve    Orders:  -     Comprehensive metabolic panel; Future    4  Anxiety  Assessment & Plan:  - patient with stable mood  - continue with Lexapro 10mg daily  5  Other hyperlipidemia  Assessment & Plan:  - stable on Crestor 10mg daily  Continue with current medications  - continue to monitor    Orders:  -     CBC and differential; Future  -     Comprehensive metabolic panel; Future  -     Lipid Panel with Direct LDL reflex; Future           Subjective     Ritika Verduzco  is a 65year old female with a history of hyperlipidemia, hypothyroid, gastric bypass and elevated liver enzymes presenting for follow up  She reports feeling \"good\"   She had sinusitis 2 weeks ago but reports feeling better after the 4th day of " medication  She denies any congestion, cough, chest pain, SOB, weakness or changes to her bowel or bladder function  She reports mild increased appetite and fatigue after decreasing her phentermine to every other day  She has gained 3 lbs since April  Discussed that increased appetite and fatigue are expected with phentermine decrease  Discussed plan to take half of a phentermine tablet daily instead of one full tablet every other day  Recent labs showed T4 0 81, TSH 0 53, AST 62,   Thyroid levels improved, LFTs improving  Hyperlipidemia  Pertinent negatives include no chest pain or shortness of breath  Review of Systems   Constitutional: Positive for appetite change and fatigue  Negative for chills and fever  Respiratory: Negative for cough and shortness of breath  Cardiovascular: Negative for chest pain and palpitations  Gastrointestinal: Negative for abdominal distention, abdominal pain, diarrhea, nausea and vomiting  Genitourinary: Negative for dysuria and hematuria  Skin: Negative for color change and rash  All other systems reviewed and are negative  Past Medical History:   Diagnosis Date   • Anemia     Post gastric bypass surgery   • Asthma     Allergy induced   • Disease of thyroid gland      Past Surgical History:   Procedure Laterality Date   • CHOLECYSTECTOMY      When I had weight loss surgery   • HERNIA REPAIR      Belly Button   • REDUCTION MAMMAPLASTY Bilateral 1985     Family History   Problem Relation Age of Onset   • Cancer Mother    • Hypertension Father         Has a pacemaker as well     • Asthma Sister    • No Known Problems Daughter    • No Known Problems Maternal Grandmother    • No Known Problems Paternal Grandmother    • No Known Problems Paternal Aunt      Social History     Socioeconomic History   • Marital status: /Civil Union     Spouse name: None   • Number of children: None   • Years of education: None   • Highest education level: None Occupational History   • None   Tobacco Use   • Smoking status: Never   • Smokeless tobacco: Never   Vaping Use   • Vaping Use: Never used   Substance and Sexual Activity   • Alcohol use: Not Currently   • Drug use: Not Currently   • Sexual activity: Yes     Partners: Male     Birth control/protection: Post-menopausal   Other Topics Concern   • None   Social History Narrative   • None     Social Determinants of Health     Financial Resource Strain: Not on file   Food Insecurity: Not on file   Transportation Needs: Not on file   Physical Activity: Not on file   Stress: Not on file   Social Connections: Not on file   Intimate Partner Violence: Not on file   Housing Stability: Not on file     Current Outpatient Medications on File Prior to Visit   Medication Sig   • albuterol (PROVENTIL HFA,VENTOLIN HFA) 90 mcg/act inhaler Inhale 2 puffs every 4 (four) hours as needed for wheezing   • Budeson-Glycopyrrol-Formoterol (Breztri Aerosphere) 160-9-4 8 MCG/ACT AERO Inhale 2 puffs daily Rinse mouth after use  • escitalopram (LEXAPRO) 10 mg tablet Take 1 tablet (10 mg total) by mouth daily   • levothyroxine 75 mcg tablet Take 1 tablet (75 mcg total) by mouth daily in the early morning Take one tablet daily on empty stomach 1 hour before breakfast and 4 hours apart from calcium and vitamin D supplementation   • rosuvastatin (CRESTOR) 10 MG tablet Take 1 tablet (10 mg total) by mouth daily   • [DISCONTINUED] phentermine 37 5 MG capsule Take 1 capsule (37 5 mg total) by mouth every morning (Patient taking differently: Take 37 5 mg by mouth every other day)   • diclofenac (VOLTAREN) 75 mg EC tablet Take 1 tablet (75 mg total) by mouth 2 (two) times a day (Patient not taking: Reported on 4/18/2023)   • [DISCONTINUED] budesonide (Pulmicort Flexhaler) 90 MCG/ACT inhaler Inhale 1 puff 2 (two) times a day Rinse mouth after use   (Patient not taking: Reported on 10/10/2022)     Allergies   Allergen Reactions   • Sulfa Antibiotics "Hives and Rash       There is no immunization history on file for this patient  Objective     /70 (BP Location: Left arm, Patient Position: Sitting, Cuff Size: Adult)   Pulse 67   Temp 98 7 °F (37 1 °C) (Tympanic)   Resp 14   Ht 5' 4\" (1 626 m)   Wt 66 kg (145 lb 9 6 oz)   SpO2 97%   BMI 24 99 kg/m²     Physical Exam  Vitals and nursing note reviewed  Constitutional:       Appearance: Normal appearance  She is well-developed  HENT:      Head: Normocephalic and atraumatic  Right Ear: Tympanic membrane normal       Left Ear: Tympanic membrane normal    Eyes:      Pupils: Pupils are equal, round, and reactive to light  Cardiovascular:      Rate and Rhythm: Normal rate and regular rhythm  Heart sounds: Normal heart sounds  No murmur heard  No friction rub  No gallop  Pulmonary:      Effort: Pulmonary effort is normal       Breath sounds: Normal breath sounds  No wheezing, rhonchi or rales  Abdominal:      General: Bowel sounds are normal       Palpations: Abdomen is soft  Musculoskeletal:         General: Normal range of motion  Cervical back: Normal range of motion and neck supple  Right lower leg: No edema  Left lower leg: No edema  Lymphadenopathy:      Cervical: No cervical adenopathy  Skin:     General: Skin is warm and dry  Neurological:      General: No focal deficit present  Mental Status: She is alert and oriented to person, place, and time  Mental status is at baseline  Cranial Nerves: No cranial nerve deficit         Jeffrey Preciado MD  "

## 2023-05-30 NOTE — ASSESSMENT & PLAN NOTE
- patient with stable and asymptomatic on levothyroxine 75mcg daily   Continue current medications   - Recent labs 05/26/2023 show T4 0 81 and TSH 0 53  - continue to monitor

## 2023-06-06 ENCOUNTER — CLINICAL SUPPORT (OUTPATIENT)
Dept: NUTRITION | Facility: HOSPITAL | Age: 58
End: 2023-06-06
Attending: STUDENT IN AN ORGANIZED HEALTH CARE EDUCATION/TRAINING PROGRAM
Payer: COMMERCIAL

## 2023-06-06 VITALS — WEIGHT: 147.05 LBS | BODY MASS INDEX: 25.24 KG/M2

## 2023-06-06 DIAGNOSIS — E66.3 OVERWEIGHT: ICD-10-CM

## 2023-06-06 DIAGNOSIS — E78.2 MIXED HYPERLIPIDEMIA: ICD-10-CM

## 2023-06-06 PROCEDURE — 97802 MEDICAL NUTRITION INDIV IN: CPT

## 2023-06-06 NOTE — PROGRESS NOTES
" Nutrition Assessment Form    Patient Name: Dwaine Rosales    YOB: 1965    Sex: Female     Assessment Date: 6/6/2023  Start Time: 10 AM Stop Time: 11 AM Total Minutes: 60     Data:  Present at session: self   Parent/Patient Concerns/reason for visit: \"High cholesterol, weight is always up and down\"   Medical Dx/Reason for Referral: E78 2 (ICD-10-CM) - Mixed hyperlipidemia  E66 3 (ICD-10-CM) - Overweight   Past Medical History:   Diagnosis Date   • Anemia     Post gastric bypass surgery   • Asthma     Allergy induced   • Disease of thyroid gland        Current Outpatient Medications   Medication Sig Dispense Refill   • albuterol (PROVENTIL HFA,VENTOLIN HFA) 90 mcg/act inhaler Inhale 2 puffs every 4 (four) hours as needed for wheezing 6 7 g 0   • Budeson-Glycopyrrol-Formoterol (Breztri Aerosphere) 160-9-4 8 MCG/ACT AERO Inhale 2 puffs daily Rinse mouth after use  10 7 g 0   • diclofenac (VOLTAREN) 75 mg EC tablet Take 1 tablet (75 mg total) by mouth 2 (two) times a day (Patient not taking: Reported on 4/18/2023) 60 tablet 1   • escitalopram (LEXAPRO) 10 mg tablet Take 1 tablet (10 mg total) by mouth daily 90 tablet 1   • levothyroxine 75 mcg tablet Take 1 tablet (75 mcg total) by mouth daily in the early morning Take one tablet daily on empty stomach 1 hour before breakfast and 4 hours apart from calcium and vitamin D supplementation 90 tablet 0   • phentermine (ADIPEX-P) 37 5 MG tablet Take 1 tablet (37 5 mg total) by mouth in the morning 30 tablet 0   • rosuvastatin (CRESTOR) 10 MG tablet Take 1 tablet (10 mg total) by mouth daily 90 tablet 3     No current facility-administered medications for this visit          Additional Meds/Supplements: Calcium supplement BID, gastric bypass MVI prescribed, something for cholesterol (unsure)   Special Learning Needs/barriers to learning/any new barriers NA   Height: HC Readings from Last 5 Encounters:   No data found for [facility], [facility]       Weight: Wt Readings from Last 10 " "Encounters:   06/06/23 66 7 kg (147 lb 0 8 oz)   05/30/23 66 kg (145 lb 9 6 oz)   05/15/23 65 5 kg (144 lb 6 4 oz)   04/18/23 64 4 kg (142 lb)   02/21/23 67 7 kg (149 lb 3 2 oz)   02/16/23 68 5 kg (151 lb)   01/17/23 68 5 kg (151 lb)   01/16/23 70 8 kg (156 lb)   12/06/22 69 8 kg (153 lb 12 8 oz)   10/24/22 77 1 kg (170 lb)      Estimated body mass index is 25 24 kg/m² as calculated from the following:    Height as of 5/30/23: 5' 4\" (1 626 m)  Weight as of this encounter: 66 7 kg (147 lb 0 8 oz)  Recent Weight Change: [x]Yes     []No  Amount: 177# last July - if true 30# weight loss in less than 1 year 16 9% body weight      Energy Needs: 1330 - 1670 kcals/day (20-25 kcal/kg)   Allergies   Allergen Reactions   • Sulfa Antibiotics Hives and Rash    or intolerances NKFA  Dairy - belly gurgles, needs bathroom  Excess sugar - belly gurgles, increase in body temp   Social History     Substance and Sexual Activity   Alcohol Use Not Currently    Very occasional   Social History     Tobacco Use   Smoking Status Never   Smokeless Tobacco Never    No   Who shops? 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  [x]Yes     []No  When: 20 years ago     Why: Gastric bypass surgery         Diet Hx: 4 servings of 16 oz hot tea daily, water 20 oz daily, unsweetened iced tea  Breakfast: 8:30 AM High protein " "drinkable yogurt (Chiobani), apple, banana, Cheerios/bran and granola cereal, maple and brown sugar oatmeal, whole english muffin with apricot jelly  Hot tea 16 oz with no sweetener   Lunch: Mid-afternoon 2PM Skip often, provides food for staff daily, does not like to eat at work -- feels tired after eating usually  Deli sandwich, dill pickles, hot dog, soup, chicken pot pie, left over dinner     Dinner: In summer 8PM 1 tacos (ground meat, flour/corn tortilla, lettuce, salsa, shredded blend cheese), 1 cup pasta (marinara sauce) with 3 oz protein (meatballs, turkey sausage), crock pot chicken with veggies and a starch     Snacks: AM -   PM - a cookie occasionally, kit michelle if out on delivery  HS - 1 hour after dinner; cookies (2-5), brownie (1) (always something sweet)   Other Notes/ Initial Assessment: Raquel Barton had a maria c-en-Y gastric bypass surgery 20 years ago  She describes teeth breaking with pregnancies in the past - was instructed to take calcium supplements  Identifies \"issues\" as eating later and reaching for sugary items  Dalia loves salads and vegetables but feels she does not include them often  When first she started losing weight 20 years ago she felt very comfortable at 130#  High cholesterol runs in her family  RD viewed most recent lab results from pt's personal phone  Dalia uses NORM, olive oil, does not use vegetable oil often, butter, I can't believe it's not butter spread  Dalia's goal is to reach her goal weight and feel happy there  Updated assessment (Follow up note only):       Nutrition Diagnosis:   Food and nutrition related knowledge deficit  related to mixed hyperlipidemia as evidenced by Conditions associated with diagnosis or treatment       Any change or new dx since previous visit:     Nutrition Diagnosis:         Medical Nutrition Therapy Intervention:  [x]Individualized Meal Plan  Discussed portion sizes in relation to food items provided in diet recall    Discussed meal/snack " "timing, \"front-loading\" of energy intake to fuel for energy use throughout the day  [x]Understanding Lab Values  Discussed LDL vs HDL and their function in the body   []Basic Pathophysiology of Disease []Food/Medication Interactions   []Food Diary []Exercise   [x]Lifestyle/Behavior Modification Techniques  Discussed the importance of eating 3 meals daily and not skipping, and how metabolism is affected  Also discussed adding in small snacks or 5-6 small meals daily if desired vs 3 larger ones, and appropriate options and portions  Appropriate timing of meals, including eating within 1 hr of waking and eating meals slowly 20mins/meals, minimizing mindless/boredom or habitual eating, etc was also mentioned  Fluid intake discussed and appropriate amounts and choices, 16 oz with meals and additional 32oz during the day  S/S dehydration also covered  []Medication, Mechanism of Action   [x]Label Reading: dietary cholesterol  []Self Blood Glucose Monitoring   [x]Weight/BMI Goals: Short term goal of 2-4# x 1 month or next visit [x]Other - Provided and discussed \"LDL-Lowering Cholesterol Nutrition Therapy\" handout  Comprehension: [x]Excellent  []Very Good  []Good  []Fair   []Poor    Receptivity: [x]Excellent  []Very Good  []Good  []Fair   []Poor    Expected Compliance: [x]Excellent  []Very Good  []Good  []Fair   []Poor        Goals (initial)/ Progress made on previous goals/new goals:  1  Jesús Parry will have lunch at least 3 times weekly  2  Jesús Parry will decrease occasion of HS snack to at most 4 times weekly - will chose protein/vegatable/fruit at least 2/4 times  3  Jessú Parry will monitor intake of dietary cholesterol, no more than 200 mg/day  No follow-ups on file    Labs:  CMP  Lab Results   Component Value Date    ALKPHOS 95 08/01/2022    ALT 35 08/01/2022    AST 23 08/01/2022    BUN 12 08/01/2022    CALCIUM 9 2 08/01/2022     (H) 08/01/2022    CO2 26 08/01/2022    CORRECTEDCA 9 7 08/01/2022    " "CREATININE 0 79 08/01/2022    EGFR 83 08/01/2022    GLUF 83 08/01/2022    K 3 4 (L) 08/01/2022       BMP  Lab Results   Component Value Date    BUN 12 08/01/2022    CALCIUM 9 2 08/01/2022     (H) 08/01/2022    CO2 26 08/01/2022    CREATININE 0 79 08/01/2022    K 3 4 (L) 08/01/2022       Lipids  No results found for: \"CHOL\"  Lab Results   Component Value Date    HDL 49 (L) 08/01/2022    HDL 52 09/25/2020    HDL 55 08/21/2019     Lab Results   Component Value Date    LDLCALC 152 (H) 08/01/2022    LDLCALC 139 (H) 09/25/2020    LDLCALC 162 (H) 08/21/2019     Lab Results   Component Value Date    TRIG 228 (H) 08/01/2022    TRIG 110 09/25/2020    TRIG 169 (H) 08/21/2019     No results found for: \"CHOLHDL\"    Hemoglobin A1C  No results found for: \"HGBA1C\"    Fasting Glucose  Lab Results   Component Value Date    GLUF 83 08/01/2022       Insulin     Thyroid  No results found for: \"W3SSGHJ\", \"C9YAHCB\", \"THYROIDAB\", \"TSH\"    Hepatic Function Panel  Lab Results   Component Value Date    ALKPHOS 95 08/01/2022    ALT 35 08/01/2022    AST 23 08/01/2022       Celiac Disease Antibody Panel  No results found for: \"ENDOMYSIAL IGA\", \"GLIADIN IGA\", \"GLIADIN IGG\", \"IGA\", \"TISSUE TRANSGLUT AB\", \"TTG IGA\"   Iron  Lab Results   Component Value Date    FERRITIN 817 (H) 09/25/2020    IRON 70 09/25/2020            Gracy Valdez  [address]  [place] [address]    "

## 2023-06-13 ENCOUNTER — TELEPHONE (OUTPATIENT)
Dept: FAMILY MEDICINE CLINIC | Facility: CLINIC | Age: 58
End: 2023-06-13
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
## 2023-06-13 DIAGNOSIS — S40.262A TICK BITE OF LEFT SHOULDER, INITIAL ENCOUNTER: Primary | ICD-10-CM

## 2023-06-13 RX ORDER — DOXYCYCLINE HYCLATE 100 MG
100 TABLET ORAL 2 TIMES DAILY
Qty: 2 TABLET | Refills: 0 | Status: SHIPPED | OUTPATIENT
Start: 2023-06-13 | End: 2023-06-14

## 2023-06-13 NOTE — TELEPHONE ENCOUNTER
----- [address] sent at 6/12/2023  6:49 PM EDT -----  Regarding: Tick bite  Contact: [phone]  I had a tick on me this morning  I pulled it off and put peroxide on it  Should I be taking a course of antibiotics?     Thank you,   Lorraine Gayle

## 2023-07-14 PROBLEM — J01.00 ACUTE NON-RECURRENT MAXILLARY SINUSITIS: Status: RESOLVED | Noted: 2023-05-15 | Resolved: 2023-07-14

## 2023-08-01 ENCOUNTER — OFFICE VISIT (OUTPATIENT)
Dept: FAMILY MEDICINE CLINIC | Facility: CLINIC | Age: 58
End: 2023-08-01
Payer: COMMERCIAL

## 2023-08-01 VITALS
RESPIRATION RATE: 16 BRPM | HEIGHT: 64 IN | DIASTOLIC BLOOD PRESSURE: 70 MMHG | WEIGHT: 150 LBS | HEART RATE: 68 BPM | TEMPERATURE: 97.1 F | BODY MASS INDEX: 25.61 KG/M2 | SYSTOLIC BLOOD PRESSURE: 122 MMHG | OXYGEN SATURATION: 97 %

## 2023-08-01 DIAGNOSIS — E03.8 OTHER SPECIFIED HYPOTHYROIDISM: Primary | ICD-10-CM

## 2023-08-01 DIAGNOSIS — E78.49 OTHER HYPERLIPIDEMIA: ICD-10-CM

## 2023-08-01 PROCEDURE — 99213 OFFICE O/P EST LOW 20 MIN: CPT | Performed by: FAMILY MEDICINE

## 2023-08-01 RX ORDER — PHENTERMINE HYDROCHLORIDE 37.5 MG/1
37.5 TABLET ORAL DAILY
Qty: 30 TABLET | Refills: 0 | Status: SHIPPED | OUTPATIENT
Start: 2023-08-01

## 2023-08-01 NOTE — ASSESSMENT & PLAN NOTE
She gained 5 pounds since her last visit. I am going to start her back on phentermine 37.5 mg daily. Discussed with possible side effect. Discussed about low-carb diet and regular exercise. Come back in 1 month.

## 2023-08-01 NOTE — PROGRESS NOTES
Name: Hany Mejía      : 1965      MRN: [id number]  Encounter Provider: Cornell Keane MD  Encounter Date: 2023   Encounter department: [facility]     1. Other specified hypothyroidism  Assessment & Plan:  Well-controlled. Continue on levothyroxine. We will continue to monitor. 2. Other hyperlipidemia    3. BMI 25.0-25.9,adult  Assessment & Plan:  She gained 5 pounds since her last visit. I am going to start her back on phentermine 37.5 mg daily. Discussed with possible side effect. Discussed about low-carb diet and regular exercise. Come back in 1 month. 4. BMI 24.0-24.9, adult  -     phentermine (ADIPEX-P) 37.5 MG tablet; Take 1 tablet (37.5 mg total) by mouth in the morning           Subjective     She is here today for follow-up for weight management. She stopped taking phentermine 2 months ago and she gained 5 pounds since the last visit. She has been trying to watch her diet but has not been as active. Review of Systems   Constitutional: Negative for chills and fever. HENT: Negative for trouble swallowing. Eyes: Negative for visual disturbance. Respiratory: Negative for cough and shortness of breath. Cardiovascular: Negative for chest pain, palpitations and leg swelling. Gastrointestinal: Negative for abdominal pain, constipation and diarrhea. Endocrine: Negative for cold intolerance and heat intolerance. Genitourinary: Negative for difficulty urinating and dysuria. Musculoskeletal: Positive for arthralgias. Negative for gait problem. Skin: Negative for rash. Neurological: Negative for dizziness, tremors, seizures and headaches. Hematological: Negative for adenopathy. Psychiatric/Behavioral: Negative for behavioral problems.        Past Medical History:   Diagnosis Date   • Anemia     Post gastric bypass surgery   • Asthma     Allergy induced   • Disease of thyroid gland      Past Surgical History: Procedure Laterality Date   • CHOLECYSTECTOMY      When I had weight loss surgery   • HERNIA REPAIR      Belly Button   • REDUCTION MAMMAPLASTY Bilateral 1985     Family History   Problem Relation Age of Onset   • Cancer Mother    • Hypertension Father         Has a pacemaker as well. • Asthma Sister    • No Known Problems Daughter    • No Known Problems Maternal Grandmother    • No Known Problems Paternal Grandmother    • No Known Problems Paternal Aunt      Social History     Socioeconomic History   • Marital status: /Civil Union     Spouse name: None   • Number of children: None   • Years of education: None   • Highest education level: None   Occupational History   • None   Tobacco Use   • Smoking status: Never   • Smokeless tobacco: Never   Vaping Use   • Vaping Use: Never used   Substance and Sexual Activity   • Alcohol use: Not Currently   • Drug use: Not Currently   • Sexual activity: Yes     Partners: Male     Birth control/protection: Post-menopausal   Other Topics Concern   • None   Social History Narrative   • None     Social Determinants of Health     Financial Resource Strain: Not on file   Food Insecurity: Not on file   Transportation Needs: Not on file   Physical Activity: Not on file   Stress: Not on file   Social Connections: Not on file   Intimate Partner Violence: Not on file   Housing Stability: Not on file     Current Outpatient Medications on File Prior to Visit   Medication Sig   • albuterol (PROVENTIL HFA,VENTOLIN HFA) 90 mcg/act inhaler Inhale 2 puffs every 4 (four) hours as needed for wheezing   • Budeson-Glycopyrrol-Formoterol (Breztri Aerosphere) 160-9-4.8 MCG/ACT AERO Inhale 2 puffs daily Rinse mouth after use.    • escitalopram (LEXAPRO) 10 mg tablet Take 1 tablet (10 mg total) by mouth daily   • rosuvastatin (CRESTOR) 10 MG tablet Take 1 tablet (10 mg total) by mouth daily   • levothyroxine 75 mcg tablet Take 1 tablet (75 mcg total) by mouth daily in the early morning Take one tablet daily on empty stomach 1 hour before breakfast and 4 hours apart from calcium and vitamin D supplementation   • [DISCONTINUED] budesonide (Pulmicort Flexhaler) 90 MCG/ACT inhaler Inhale 1 puff 2 (two) times a day Rinse mouth after use. (Patient not taking: Reported on 10/10/2022)   • [DISCONTINUED] phentermine (ADIPEX-P) 37.5 MG tablet Take 1 tablet (37.5 mg total) by mouth in the morning (Patient not taking: Reported on 8/1/2023)     Allergies   Allergen Reactions   • Sulfa Antibiotics Hives and Rash       There is no immunization history on file for this patient. Objective     /70 (BP Location: Left arm, Patient Position: Sitting, Cuff Size: Adult)   Pulse 68   Temp (!) 97.1 °F (36.2 °C) (Tympanic)   Resp 16   Ht 5' 4" (1.626 m)   Wt 68 kg (150 lb)   SpO2 97%   BMI 25.75 kg/m²     Physical Exam  Vitals and nursing note reviewed. Constitutional:       Appearance: She is well-developed. HENT:      Head: Normocephalic and atraumatic. Eyes:      Pupils: Pupils are equal, round, and reactive to light. Cardiovascular:      Rate and Rhythm: Normal rate and regular rhythm. Heart sounds: Normal heart sounds. Pulmonary:      Effort: Pulmonary effort is normal.      Breath sounds: Normal breath sounds. Abdominal:      General: Bowel sounds are normal.      Palpations: Abdomen is soft. Musculoskeletal:         General: Normal range of motion. Cervical back: Normal range of motion and neck supple. Lymphadenopathy:      Cervical: No cervical adenopathy. Skin:     General: Skin is warm. Neurological:      Mental Status: She is alert and oriented to person, place, and time. Cranial Nerves: No cranial nerve deficit.        Derian Kraus MD

## 2023-08-02 DIAGNOSIS — E78.49 OTHER HYPERLIPIDEMIA: ICD-10-CM

## 2023-08-02 RX ORDER — ROSUVASTATIN CALCIUM 10 MG/1
10 TABLET, COATED ORAL DAILY
Qty: 90 TABLET | Refills: 3 | Status: SHIPPED | OUTPATIENT
Start: 2023-08-02

## 2023-08-08 ENCOUNTER — CLINICAL SUPPORT (OUTPATIENT)
Dept: NUTRITION | Facility: HOSPITAL | Age: 58
End: 2023-08-08
Payer: COMMERCIAL

## 2023-08-08 VITALS — WEIGHT: 148.15 LBS | BODY MASS INDEX: 25.43 KG/M2

## 2023-08-08 DIAGNOSIS — E78.2 MIXED HYPERLIPIDEMIA: ICD-10-CM

## 2023-08-08 PROCEDURE — 97803 MED NUTRITION INDIV SUBSEQ: CPT

## 2023-08-08 NOTE — PROGRESS NOTES
Nutrition Assessment Form    Patient Name: Oleksandr Griffiths    YOB: 1965    Sex: Female     Assessment Date: 8/8/2023  Start Time: 9:00 AM Stop Time: 9:30 AM Total Minutes: 30     Data:  Present at session: self   Parent/Patient Concerns/reason for visit: "I am having a tough time breaking habits"   Medical Dx/Reason for Referral: E78.2 (ICD-10-CM) - Mixed hyperlipidemia  E66.3 (ICD-10-CM) - Overweight   Past Medical History:   Diagnosis Date   • Anemia     Post gastric bypass surgery   • Asthma     Allergy induced   • Disease of thyroid gland        Current Outpatient Medications   Medication Sig Dispense Refill   • albuterol (PROVENTIL HFA,VENTOLIN HFA) 90 mcg/act inhaler Inhale 2 puffs every 4 (four) hours as needed for wheezing 6.7 g 0   • Budeson-Glycopyrrol-Formoterol (Breztri Aerosphere) 160-9-4.8 MCG/ACT AERO Inhale 2 puffs daily Rinse mouth after use. 10.7 g 0   • escitalopram (LEXAPRO) 10 mg tablet Take 1 tablet (10 mg total) by mouth daily 90 tablet 1   • levothyroxine 75 mcg tablet Take 1 tablet (75 mcg total) by mouth daily in the early morning Take one tablet daily on empty stomach 1 hour before breakfast and 4 hours apart from calcium and vitamin D supplementation 90 tablet 0   • phentermine (ADIPEX-P) 37.5 MG tablet Take 1 tablet (37.5 mg total) by mouth in the morning 30 tablet 0   • rosuvastatin (CRESTOR) 10 MG tablet take 1 tablet by mouth once daily 90 tablet 3     No current facility-administered medications for this visit.         Additional Meds/Supplements: Calcium supplement BID, gastric bypass MVI prescribed, something for cholesterol (unsure)   Special Learning Needs/barriers to learning/any new barriers NA   Height: HC Readings from Last 5 Encounters:   No data found for [facility].      Weight: Wt Readings from Last 10 Encounters:   08/08/23 67.2 kg (148 lb 2.4 oz)   08/01/23 68 kg (150 lb)   06/06/23 66.7 kg (147 lb 0.8 oz)   05/30/23 66 kg (145 lb 9.6 oz)   05/15/23 65.5 kg (144 lb 6.4 oz)   04/18/23 64.4 kg (142 lb)   02/21/23 67.7 kg (149 lb 3.2 oz)   02/16/23 68.5 kg (151 lb)   01/17/23 68.5 kg (151 lb)   01/16/23 70.8 kg (156 lb)      Estimated body mass index is 25.43 kg/m² as calculated from the following:    Height as of 8/1/23: 5' 4" (1.626 m). Weight as of this encounter: 67.2 kg (148 lb 2.4 oz). Recent Weight Change: [x]Yes     []No  Amount: 4# weight gain in 3 months      Energy Needs: 1330 - 1670 kcals/day (20-25 kcal/kg)   Allergies   Allergen Reactions   • Sulfa Antibiotics Hives and Rash    or intolerances NKFA  Dairy - belly gurgles, needs bathroom  Excess sugar - belly gurgles, increase in body temp   Social History     Substance and Sexual Activity   Alcohol Use Not Currently    Very occasional   Social History     Tobacco Use   Smoking Status Never   Smokeless Tobacco Never    No   Who shops? 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  [x]Yes     []No  When: 20 years ago     Why: Gastric bypass surgery         Diet Hx: 4 servings of 16 oz hot tea daily, water 20 oz daily, unsweetened iced tea  Breakfast: 8:30 AM High protein drinkable yogurt (Chiobani), apple, banana, Cheerios/bran and granola cereal, maple and brown sugar oatmeal, whole english muffin with apricot jelly  Hot tea 16 oz with no sweetener   Lunch: Mid-afternoon 2PM Skip often, provides food for staff daily, does not like to eat at work -- feels tired after eating usually  Deli sandwich, dill pickles, hot dog, soup, chicken pot pie, left over dinner     Dinner: In summer 8PM 1 tacos (ground meat, flour/corn tortilla, lettuce, salsa, shredded blend cheese), 1 cup pasta (marinara sauce) with 3 oz protein (meatballs, turkey sausage), crock pot chicken with veggies and a starch     Snacks: AM -   PM - a cookie occasionally, kit michelle if out on delivery  HS - 1 hour after dinner; cookies (2-5), brownie (1) (always something sweet)   Other Notes/ Initial Assessment: Ana Maria Robles had a maria c-en-Y gastric bypass surgery 20 years ago. She describes teeth breaking with pregnancies in the past - was instructed to take calcium supplements. Identifies "issues" as eating later and reaching for sugary items. Dalia loves salads and vegetables but feels she does not include them often. When first she started losing weight 20 years ago she felt very comfortable at 130#. High cholesterol runs in her family. RD viewed most recent lab results from pt's personal phone. Dalia uses NORM, olive oil, does not use vegetable oil often, butter, I can't believe it's not butter spread. Dalia's goal is to reach her goal weight and feel happy there. Updated assessment (Follow up note only): 8/8/23   Having a tough time breaking habits. She reports no change in dietary habits, recall relatively the same. Started CoQ10 about 2-3 months ago - recommendation from her . Ana Maria Robles is working late, dinner is late, and is feeling as though she's not getting enough meat and veggies in her diet. She reports carbohydrates are the easy thing to grab. Habits she is trying to change include skipping lunch, late night shacking, high-carb snacks. Snacks are located out of reach in a high cabinet. Identifies having healthy snacks within eyesight at home to be a good intervention.  She plans to utilize [employer] on countertop to house protein containing snacks as well as easy to eat fruits. Nutrition Diagnosis:   Food and nutrition related knowledge deficit  related to mixed hyperlipidemia as evidenced by Conditions associated with diagnosis or treatment       Any change or new dx since previous visit:     Nutrition Diagnosis:         Medical Nutrition Therapy Intervention:  [x]Individualized Meal Plan  Discussed adequate protein intake and protein-containing snacks to include. []Understanding Lab Values     []Basic Pathophysiology of Disease []Food/Medication Interactions   []Food Diary []Exercise   [x]Lifestyle/Behavior Modification Techniques  Discussed the importance of eating 3 meals daily and not skipping, and how metabolism is affected. Also discussed adding in small snacks or 5-6 small meals daily if desired vs 3 larger ones, and appropriate options and portions. Appropriate timing of meals, including eating within 1 hr of waking and eating meals slowly 20mins/meals, minimizing mindless/boredom or habitual eating, etc was also mentioned. []Medication, Mechanism of Action   [x]Label Reading: serving size []Self Blood Glucose Monitoring   [x]Weight/BMI Goals: Short term goal of 2-4# x 1 month or next visit [x]Other - Provided snack ideas handout          Comprehension: [x]Excellent  []Very Good  []Good  []Fair   []Poor    Receptivity: [x]Excellent  []Very Good  []Good  []Fair   []Poor    Expected Compliance: [x]Excellent  []Very Good  []Good  []Fair   []Poor        Goals (initial)/ Progress made on previous goals/new goals:  1. Kendell Parker will have lunch at least 3 times weekly. 2. Kendell Parker will decrease occasion of HS snack to at most 4 times weekly. 3. Kendell Parker will have snack from health options newly located within eyesight at least 4 times weekly. No follow-ups on file.   Labs:  CMP  Lab Results   Component Value Date    K 3.4 (L) 08/01/2022     (H) 08/01/2022    CO2 26 08/01/2022    BUN 12 08/01/2022    CREATININE 0.79 08/01/2022    GLUF 83 08/01/2022    CALCIUM 9.2 08/01/2022    CORRECTEDCA 9.7 08/01/2022    AST 23 08/01/2022    ALT 35 08/01/2022    ALKPHOS 95 08/01/2022    EGFR 83 08/01/2022       BMP  Lab Results   Component Value Date    CALCIUM 9.2 08/01/2022    K 3.4 (L) 08/01/2022    CO2 26 08/01/2022     (H) 08/01/2022    BUN 12 08/01/2022    CREATININE 0.79 08/01/2022       Lipids  No results found for: "CHOL"  Lab Results   Component Value Date    HDL 49 (L) 08/01/2022    HDL 52 09/25/2020    HDL 55 08/21/2019     Lab Results   Component Value Date    LDLCALC 152 (H) 08/01/2022    LDLCALC 139 (H) 09/25/2020    LDLCALC 162 (H) 08/21/2019     Lab Results   Component Value Date    TRIG 228 (H) 08/01/2022    TRIG 110 09/25/2020    TRIG 169 (H) 08/21/2019     No results found for: "CHOLHDL"    Hemoglobin A1C  No results found for: "HGBA1C"    Fasting Glucose  Lab Results   Component Value Date    GLUF 83 08/01/2022       Insulin     Thyroid  No results found for: "TSH", "B2XRBEQ", "A0IOCME", "THYROIDAB"    Hepatic Function Panel  Lab Results   Component Value Date    ALT 35 08/01/2022    AST 23 08/01/2022    ALKPHOS 95 08/01/2022       Celiac Disease Antibody Panel  No results found for: "ENDOMYSIAL IGA", "GLIADIN IGA", "GLIADIN IGG", "IGA", "TISSUE TRANSGLUT AB", "TTG IGA"   Iron  Lab Results   Component Value Date    IRON 70 09/25/2020    FERRITIN 817 (H) 09/25/2020            Baldomero Bearden, [address] [facility]  [address]  [place] [id number]

## 2023-08-22 ENCOUNTER — OFFICE VISIT (OUTPATIENT)
Dept: ENDOCRINOLOGY | Facility: CLINIC | Age: 58
End: 2023-08-22
Payer: COMMERCIAL

## 2023-08-22 VITALS
DIASTOLIC BLOOD PRESSURE: 64 MMHG | WEIGHT: 145 LBS | SYSTOLIC BLOOD PRESSURE: 120 MMHG | HEIGHT: 64 IN | HEART RATE: 73 BPM | BODY MASS INDEX: 24.75 KG/M2

## 2023-08-22 DIAGNOSIS — E78.2 MIXED HYPERLIPIDEMIA: ICD-10-CM

## 2023-08-22 DIAGNOSIS — R79.89 ELEVATED FERRITIN: ICD-10-CM

## 2023-08-22 DIAGNOSIS — E55.9 VITAMIN D DEFICIENCY: ICD-10-CM

## 2023-08-22 DIAGNOSIS — Z98.84 S/P BARIATRIC SURGERY: ICD-10-CM

## 2023-08-22 DIAGNOSIS — E03.9 HYPOTHYROIDISM, UNSPECIFIED TYPE: Primary | ICD-10-CM

## 2023-08-22 PROCEDURE — 99214 OFFICE O/P EST MOD 30 MIN: CPT | Performed by: STUDENT IN AN ORGANIZED HEALTH CARE EDUCATION/TRAINING PROGRAM

## 2023-08-22 NOTE — PROGRESS NOTES
Nabeel Layton 63 y.o. female MRN: [id number]    Encounter: [id number]      Assessment/Plan     1. Hypothyroidism - clinically euthyroid on levothyroxine 75 mcg daily. I am requesting updated thyroid function tests. 2. History of maria c-en-y surgery - Continue bariatric MVI plus calcium citrate. Follow up CMP and iPTH    3. Vit D deficiency - follow up repeat vit D. Continue replacement    4. Mixed hyperlipidemia - improved on statin. Will arrange follow up monitoring    5. Osteopenia - encouraged optimization of nutrition and activity as well as fall prevention. Next DXA Aug 2024    6. Elevated Ferritin - iron indices otherwise wnl. Ferritin has been persistently elevated. Agree with consideration for RUQ US, as well as other possible etiologies for this non-specific lab    7. Transaminitis - normal RUQ US. Will arrange repeat testing of LFTs. Consider GI referral if persistent elevation of transaminases     Orders Placed This Encounter   Procedures   • Comprehensive metabolic panel Lab Collect   • PTH, intact Lab Collect Lab Collect   • Vitamin D 25 hydroxy Lab Collect   • Lipid Panel with Direct LDL reflex Lab Collect   • T4, free Lab Collect   • TSH, 3rd generation Lab Collect     RTC 6-mo    CC: Hypothyroidism, s/p RYGB    History of Present Illness     HPI:    Christopher Lester returns today for follow up of hypothyroidism & s/p RYBG. No acute concerns today. Dalia remains on LT4 75 mcg daily and denies any thyroidal symptoms. She continues to take MVI, calcium citrate, and Vit D supplementation for s/p RYGB. Next DXA Aug 2024. She continues to take phentermine 37.5 mg daily for weight MGMT. Weight is overall improving. Christopher Lester is also enjoying improved sense of wellness and activity. She is participating more often with physical activity. Christopher Lester is also taking crestor 10 mg daily. She has had improvement of her cholesterol on therapy.      Review of Systems   Constitutional: Negative for diaphoresis and unexpected weight change. HENT: Negative for trouble swallowing and voice change. Cardiovascular: Negative for chest pain and palpitations. Gastrointestinal: Negative for nausea and vomiting. Endocrine: Negative for cold intolerance and heat intolerance. Genitourinary: Negative for flank pain. Neurological: Negative for tremors. All other systems reviewed and are negative. Historical Information   Past Medical History:   Diagnosis Date   • Anemia     Post gastric bypass surgery   • Asthma     Allergy induced   • Disease of thyroid gland      Past Surgical History:   Procedure Laterality Date   • CHOLECYSTECTOMY      When I had weight loss surgery   • HERNIA REPAIR      Belly Button   • REDUCTION MAMMAPLASTY Bilateral 1985     Social History   Social History     Substance and Sexual Activity   Alcohol Use Not Currently     Social History     Substance and Sexual Activity   Drug Use Not Currently     Social History     Tobacco Use   Smoking Status Never   Smokeless Tobacco Never     Family History:   Family History   Problem Relation Age of Onset   • Cancer Mother    • Hypertension Father         Has a pacemaker as well. • Asthma Sister    • No Known Problems Daughter    • No Known Problems Maternal Grandmother    • No Known Problems Paternal Grandmother    • No Known Problems Paternal Aunt        Meds/Allergies   Current Outpatient Medications   Medication Sig Dispense Refill   • albuterol (PROVENTIL HFA,VENTOLIN HFA) 90 mcg/act inhaler Inhale 2 puffs every 4 (four) hours as needed for wheezing 6.7 g 0   • Budeson-Glycopyrrol-Formoterol (Breztri Aerosphere) 160-9-4.8 MCG/ACT AERO Inhale 2 puffs daily Rinse mouth after use.  10.7 g 0   • escitalopram (LEXAPRO) 10 mg tablet Take 1 tablet (10 mg total) by mouth daily 90 tablet 1   • levothyroxine 75 mcg tablet Take 1 tablet (75 mcg total) by mouth daily in the early morning Take one tablet daily on empty stomach 1 hour before breakfast and 4 hours apart from calcium and vitamin D supplementation 90 tablet 0   • phentermine (ADIPEX-P) 37.5 MG tablet Take 1 tablet (37.5 mg total) by mouth in the morning 30 tablet 0   • rosuvastatin (CRESTOR) 10 MG tablet take 1 tablet by mouth once daily 90 tablet 3     No current facility-administered medications for this visit. Allergies   Allergen Reactions   • Sulfa Antibiotics Hives and Rash       Objective   Vitals: Blood pressure 120/64, pulse 73, height 5' 4" (1.626 m), weight 65.8 kg (145 lb). Physical Exam  Vitals reviewed. Constitutional:       Appearance: Normal appearance. HENT:      Head: Normocephalic and atraumatic. Nose: Nose normal.   Eyes:      General: No scleral icterus. Conjunctiva/sclera: Conjunctivae normal.      Comments: Heterochromia    Neck:      Thyroid: No thyroid mass, thyromegaly or thyroid tenderness. Pulmonary:      Effort: Pulmonary effort is normal. No respiratory distress. Musculoskeletal:      Cervical back: Normal range of motion. Comments: No tendinous xanthomas on hands, achilles    Lymphadenopathy:      Cervical: No cervical adenopathy. Skin:     General: Skin is warm and dry. Neurological:      General: No focal deficit present. Mental Status: She is alert. Psychiatric:         Mood and Affect: Mood normal.         Behavior: Behavior normal.         The history was obtained from the review of the chart, patient. Lab Results:      Ref Range & Units 5/26/23 10:02 AM   Bilirubin, Total 0.2 - 1.0 mg/dL 0.5    Comment: Eltrombopag and its metabolites may interfere with this assay causing erroneously high patient results.    Bilirubin, Direct 0.0 - 0.2 mg/dL 0.1    Alkaline Phosphatase 35 - 120 U/L 72    AST <41 U/L 62 High     ALT <56 U/L 111 High     Protein, Total 6.3 - 8.3 g/dL 6.6    Albumin 3.5 - 5.7 g/dL 4.3       Ref Range & Units 5/26/23 10:02 AM   Thyroid Stimulating Hormone 0.45 - 5.33 uIU/mL 0.53       Ref Range & Units 5/26/23 10:02 AM T4, Free 0.61 - 1.12 ng/dL 0.81           Ref Range & Units 1/16/23  8:43 AM   Glucose 65 - 99 mg/dL 94    BUN 7 - 25 mg/dL 14    Creatinine 0.40 - 1.10 mg/dL 0.88    Sodium 135 - 145 mmol/L 140    Potassium 3.5 - 5.2 mmol/L 4.2    Chloride 100 - 109 mmol/L 105    Carbon Dioxide 23 - 31 mmol/L 26    Calcium 8.5 - 10.1 mg/dL 9.5    Alkaline Phosphatase 35 - 120 U/L 117    Albumin 3.5 - 4.8 g/dL 4.0    Bilirubin, Total 0.2 - 1.0 mg/dL 0.4    Comment: Use of this assay is not recommended for patients undergoing treatment with eltrombopag due to the potential for falsely elevated results. Protein, Total 6.3 - 8.3 g/dL 6.9    AST <41 U/L 56 High     ALT <56 U/L 86 High     Anion Gap 3 - 11 9    eGFRcr >59 77       Ref Range & Units 4/14/23  9:22 AM   Cholesterol <200 mg/dL 171    Triglyceride <150 mg/dL 155 High     Cholesterol, HDL, Direct 23 - 92 mg/dL 59    Cholesterol, Non-HDL <160 mg/dL 112    CHOL/HDL Ratio  2.9    Cholesterol, LDL, Calculated <130 mg/dL 81           Ref Range & Units 1/16/23  8:43 AM   Thyroid Stimulating Hormone 0.36 - 3.74 uIU/mL 7.39 High       Ref Range & Units 1/16/23  8:43 AM   T4, Free 0.76 - 1.46 ng/dL 0.98         Ref Range & Units 1/16/23  8:43 AM   Iron 40 - 180 ug/dL 92    Transferrin 200 - 400 mg/dL 268    % Saturation 20 - 50 % 28    TIBC 260 - 430 ug/dL 329       Ref Range & Units 1/16/23  8:43 AM   Ferritin 10 - 291 ng/mL 901 High        Ref Range & Units 1/16/23  8:43 AM   Cholesterol <200 mg/dL 300 High     Triglyceride <150 mg/dL 191 High     Cholesterol, HDL, Direct >40 mg/dL 58    Cholesterol, Non-HDL <160 mg/dL 242 High     Comment: Note: For NCEP interpretive guidelines please refer to the Laboratory Handbook.    CHOL/HDL Ratio  5.17    Comment: Relative Risk   1/2 Average Risk          3.27   Average Risk              4.44   2X Average Risk           7.05   3X Average Risk          11.04   Cholesterol, LDL, Calculated <130 mg/dL 204 High       Ref Range & Units 1/16/23  8:43 AM   Vitamin D, 25-OH 30 - 100 ng/mL 44             Component      Latest Ref Rng & Units 8/1/2022   WBC      4.31 - 10.16 Thousand/uL 6.81   Red Blood Cell Count      3.81 - 5.12 Million/uL 4.19   Hemoglobin      11.5 - 15.4 g/dL 13.4   HCT      34.8 - 46.1 % 41.4   MCV      82 - 98 fL 99 (H)   MCH      26.8 - 34.3 pg 32.0   MCHC      31.4 - 37.4 g/dL 32.4   RDW      11.6 - 15.1 % 13.0   MPV      8.9 - 12.7 fL 9.9   Platelet Count      939 - 390 Thousands/uL 263   nRBC      /100 WBCs 0   Neutrophils %      43 - 75 % 52   Immat GRANS %      0 - 2 % 0   Lymphocytes Relative      14 - 44 % 32   Monocytes Relative      4 - 12 % 8   Eosinophils      0 - 6 % 8 (H)   Basophils Relative      0 - 1 % 0   Absolute Neutrophils      1.85 - 7.62 Thousands/µL 3.50   Immature Grans Absolute      0.00 - 0.20 Thousand/uL 0.01   Lymphocytes Absolute      0.60 - 4.47 Thousands/µL 2.15   Absolute Monocytes      0.17 - 1.22 Thousand/µL 0.57   Absolute Eosinophils      0.00 - 0.61 Thousand/µL 0.55   Basophils Absolute      0.00 - 0.10 Thousands/µL 0.03   Sodium      135 - 147 mmol/L 141   Potassium      3.5 - 5.3 mmol/L 3.4 (L)   Chloride      96 - 108 mmol/L 110 (H)   CO2      21 - 32 mmol/L 26   Anion Gap      4 - 13 mmol/L 5   BUN      5 - 25 mg/dL 12   Creatinine      0.60 - 1.30 mg/dL 0.79   GLUCOSE FASTING      65 - 99 mg/dL 83   Calcium      8.3 - 10.1 mg/dL 9.2   CORRECTED CALCIUM      8.3 - 10.1 mg/dL 9.7   AST      5 - 45 U/L 23   ALT      12 - 78 U/L 35   Alkaline Phosphatase      46 - 116 U/L 95   Total Protein      6.4 - 8.4 g/dL 6.7   Albumin      3.5 - 5.0 g/dL 3.4 (L)   TOTAL BILIRUBIN      0.20 - 1.00 mg/dL 0.42   eGFR      ml/min/1.73sq m 83   Cholesterol      See Comment mg/dL 247 (H)   Triglycerides      See Comment mg/dL 228 (H)   HDL      >=50 mg/dL 49 (L)   LDL Calculated      0 - 100 mg/dL 152 (H)   Non-HDL Cholesterol      mg/dl 198   Vit D, 25-Hydroxy      30.0 - 100.0 ng/mL 45.3   Free T4      0.76 - 1.46 ng/dL 1.02   TSH 3RD GENERATON      0.450 - 4.500 uIU/mL 0.628   INSULIN, FASTING      3.0 - 25.0 mU/L 6.1     Imaging Studies:   ?  8/17/2022    CENTRAL  DXA SCAN     CLINICAL HISTORY:  43-year-old postmenopausal female. OTHER RISK FACTORS:  SSRI therapy.     PHARMACOLOGIC THERAPY FOR OSTEOPOROSIS:  None.     TECHNIQUE: Bone densitometry was performed using a GE Lunar Prodigy  bone densitometer. Regions of interest appear properly placed.      COMPARISON: 2/20/2017.     RESULTS:      LUMBAR SPINE  Level: L1-L4 :   BMD:  0.91  gm/cm2   T-score: -2.3         LEFT TOTAL HIP:   BMD: 0.798  gm/cm2   T-score: -1.7      LEFT FEMORAL NECK:   BMD: 0.725  gm/cm2   T score: -2.3      RIGHT TOTAL HIP:   BMD:  0.801  gm/cm2   T-score: -1.6     RIGHT FEMORAL NECK:   BMD:  0.721  gm/cm2    T score: -2.3         IMPRESSION:     1. Low bone mass (osteopenia).     2. Since a DXA study from 2/20/2017, there has been:  A  STATISTICALLY SIGNIFICANT INCREASE in bone mineral density of  0.017 g/cm2 (2.2)% in the hips.           3. The 10 year risk of hip fracture is 1.4% with the 10 year risk of major osteoporotic fracture being 8.8% as calculated by the [facility]/WHO fracture risk assessment tool (FRAX).    4. The current NOF guidelines recommend treating patients with a T-score of -2.5 or less in the lumbar spine or hips, or in post-menopausal women and men over the age of 48 with low bone mass (osteopenia) and a FRAX 10 year risk score of >3% for hip   fracture and/or >20% for major osteoporotic fracture.     5. The NOF recommends follow-up DXA in 1-2 years after initiating therapy for osteoporosis and every 2 years thereafter. More frequent evaluation is appropriate for patients with conditions associated with rapid bone loss, such as glucocorticoid   therapy.  The interval between DXA screenings may be longer for individuals without major risk factors and initial T-score in the normal or upper low bone mass range. I have personally reviewed pertinent reports. Portions of the record may have been created with voice recognition software. Occasional wrong word or "sound a like" substitutions may have occurred due to the inherent limitations of voice recognition software. Read the chart carefully and recognize, using context, where substitutions have occurred.

## 2023-08-23 DIAGNOSIS — F41.9 ANXIETY: ICD-10-CM

## 2023-08-23 RX ORDER — ESCITALOPRAM OXALATE 10 MG/1
10 TABLET ORAL DAILY
Qty: 90 TABLET | Refills: 1 | Status: SHIPPED | OUTPATIENT
Start: 2023-08-23

## 2023-08-29 ENCOUNTER — OFFICE VISIT (OUTPATIENT)
Dept: FAMILY MEDICINE CLINIC | Facility: CLINIC | Age: 58
End: 2023-08-29
Payer: COMMERCIAL

## 2023-08-29 ENCOUNTER — TELEPHONE (OUTPATIENT)
Dept: FAMILY MEDICINE CLINIC | Facility: CLINIC | Age: 58
End: 2023-08-29

## 2023-08-29 ENCOUNTER — HOSPITAL ENCOUNTER (EMERGENCY)
Facility: HOSPITAL | Age: 58
Discharge: HOME | End: 2023-08-29
Attending: EMERGENCY MEDICINE
Payer: COMMERCIAL

## 2023-08-29 VITALS
RESPIRATION RATE: 16 BRPM | BODY MASS INDEX: 25.27 KG/M2 | SYSTOLIC BLOOD PRESSURE: 143 MMHG | WEIGHT: 148 LBS | DIASTOLIC BLOOD PRESSURE: 86 MMHG | OXYGEN SATURATION: 100 % | HEIGHT: 64 IN | TEMPERATURE: 97.1 F | HEART RATE: 80 BPM

## 2023-08-29 VITALS
TEMPERATURE: 95.2 F | OXYGEN SATURATION: 99 % | HEART RATE: 80 BPM | WEIGHT: 148 LBS | DIASTOLIC BLOOD PRESSURE: 84 MMHG | BODY MASS INDEX: 25.27 KG/M2 | HEIGHT: 64 IN | SYSTOLIC BLOOD PRESSURE: 132 MMHG | RESPIRATION RATE: 16 BRPM

## 2023-08-29 DIAGNOSIS — M25.522 LEFT ELBOW PAIN: Primary | ICD-10-CM

## 2023-08-29 DIAGNOSIS — E03.8 OTHER SPECIFIED HYPOTHYROIDISM: Primary | ICD-10-CM

## 2023-08-29 DIAGNOSIS — E78.49 OTHER HYPERLIPIDEMIA: ICD-10-CM

## 2023-08-29 DIAGNOSIS — M25.542 ARTHRALGIA OF BOTH HANDS: ICD-10-CM

## 2023-08-29 DIAGNOSIS — E03.9 HYPOTHYROIDISM, UNSPECIFIED TYPE: ICD-10-CM

## 2023-08-29 DIAGNOSIS — M25.541 ARTHRALGIA OF BOTH HANDS: ICD-10-CM

## 2023-08-29 PROCEDURE — 99284 EMERGENCY DEPT VISIT MOD MDM: CPT | Performed by: EMERGENCY MEDICINE

## 2023-08-29 PROCEDURE — 99283 EMERGENCY DEPT VISIT LOW MDM: CPT

## 2023-08-29 PROCEDURE — 99214 OFFICE O/P EST MOD 30 MIN: CPT | Performed by: FAMILY MEDICINE

## 2023-08-29 RX ORDER — PHENTERMINE HYDROCHLORIDE 37.5 MG/1
37.5 TABLET ORAL DAILY
Qty: 30 TABLET | Refills: 0 | Status: SHIPPED | OUTPATIENT
Start: 2023-08-29

## 2023-08-29 RX ORDER — ACETAMINOPHEN 325 MG/1
650 TABLET ORAL ONCE
Status: COMPLETED | OUTPATIENT
Start: 2023-08-29 | End: 2023-08-29

## 2023-08-29 RX ORDER — IBUPROFEN 600 MG/1
600 TABLET ORAL ONCE
Status: COMPLETED | OUTPATIENT
Start: 2023-08-29 | End: 2023-08-29

## 2023-08-29 RX ORDER — LEVOTHYROXINE SODIUM 0.05 MG/1
50 TABLET ORAL
Qty: 90 TABLET | Refills: 1 | Status: SHIPPED | OUTPATIENT
Start: 2023-08-29

## 2023-08-29 RX ADMIN — ACETAMINOPHEN 650 MG: 325 TABLET ORAL at 13:42

## 2023-08-29 RX ADMIN — IBUPROFEN 600 MG: 600 TABLET ORAL at 13:42

## 2023-08-29 NOTE — PROGRESS NOTES
Name: Annika Calvin      : 1965      MRN: [id number]  Encounter Provider: Errol Perdomo MD  Encounter Date: 2023   Encounter department: [facility]     1. Other specified hypothyroidism  Assessment & Plan:  Not well controlled. I am going to decrease her levothyroxine to 50 mcg daily. We will continue to monitor. Repeat the blood work in 2 months. Continue to follow-up with endocrinologist.      2. Other hyperlipidemia  Assessment & Plan:  Well-controlled. Continue on Crestor 10 mg daily. Continue to monitor. 3. Arthralgia of both hands  Assessment & Plan:  I am going to check a rheumatoid factor, AVIS, uric acid and CRP. I will consider referral to see a rheumatologist.    Orders:  -     Uric acid; Future  -     RF Screen w/ Reflex to Titer; Future  -     AVIS Screen w/ Reflex to Titer/Pattern; Future  -     C-reactive protein; Future    4. Hypothyroidism, unspecified type  Assessment & Plan:  Not well controlled. I am going to decrease her levothyroxine to 50 mcg daily. We will continue to monitor. Repeat the blood work in 2 months. Continue to follow-up with endocrinologist.    Orders:  -     levothyroxine 50 mcg tablet; Take 1 tablet (50 mcg total) by mouth daily in the early morning Take one tablet daily on empty stomach 1 hour before breakfast and 4 hours apart from calcium and vitamin D supplementation    5. BMI 25.0-25.9,adult  Assessment & Plan:  She lost 2 pounds since her last visit. It was discussed about low-carb diet and regular exercise. She was given refill for phentermine. Come back in 1 month. Orders:  -     phentermine (ADIPEX-P) 37.5 MG tablet; Take 1 tablet (37.5 mg total) by mouth in the morning           Subjective     She is here today for follow-up multiple medical problems. She has been taking her medications but she denies any side effects. Her blood work was done yesterday showed TSH is very low.   She is on levothyroxine 75 mcg daily. She complains of pain, swelling and stiffness in her hands bilateral and pain in her elbows. Had a blood work also showed elevated ferritin. It improved from before. She has been followed by endocrinologist.    Review of Systems   Constitutional: Negative for chills and fever. HENT: Negative for trouble swallowing. Eyes: Negative for visual disturbance. Respiratory: Negative for cough and shortness of breath. Cardiovascular: Negative for chest pain, palpitations and leg swelling. Gastrointestinal: Negative for abdominal pain, constipation and diarrhea. Endocrine: Negative for cold intolerance and heat intolerance. Genitourinary: Negative for difficulty urinating and dysuria. Musculoskeletal: Positive for arthralgias. Negative for gait problem. Skin: Negative for rash. Neurological: Negative for dizziness, tremors, seizures and headaches. Hematological: Negative for adenopathy. Psychiatric/Behavioral: Negative for behavioral problems. Past Medical History:   Diagnosis Date   • Anemia     Post gastric bypass surgery   • Asthma     Allergy induced   • Disease of thyroid gland      Past Surgical History:   Procedure Laterality Date   • CHOLECYSTECTOMY      When I had weight loss surgery   • HERNIA REPAIR      Belly Button   • REDUCTION MAMMAPLASTY Bilateral 1985     Family History   Problem Relation Age of Onset   • Cancer Mother    • Hypertension Father         Has a pacemaker as well.    • Asthma Sister    • No Known Problems Daughter    • No Known Problems Maternal Grandmother    • No Known Problems Paternal Grandmother    • No Known Problems Paternal Aunt      Social History     Socioeconomic History   • Marital status: /Civil Union     Spouse name: None   • Number of children: None   • Years of education: None   • Highest education level: None   Occupational History   • None   Tobacco Use   • Smoking status: Never   • Smokeless tobacco: Never   Vaping Use   • Vaping Use: Never used   Substance and Sexual Activity   • Alcohol use: Not Currently   • Drug use: Not Currently   • Sexual activity: Yes     Partners: Male     Birth control/protection: Post-menopausal   Other Topics Concern   • None   Social History Narrative   • None     Social Determinants of Health     Financial Resource Strain: Not on file   Food Insecurity: Not on file   Transportation Needs: Not on file   Physical Activity: Not on file   Stress: Not on file   Social Connections: Not on file   Intimate Partner Violence: Not on file   Housing Stability: Not on file     Current Outpatient Medications on File Prior to Visit   Medication Sig   • albuterol (PROVENTIL HFA,VENTOLIN HFA) 90 mcg/act inhaler Inhale 2 puffs every 4 (four) hours as needed for wheezing   • Budeson-Glycopyrrol-Formoterol (Breztri Aerosphere) 160-9-4.8 MCG/ACT AERO Inhale 2 puffs daily Rinse mouth after use. • escitalopram (LEXAPRO) 10 mg tablet TAKE ONE TABLET BY MOUTH ONCE DAILY   • rosuvastatin (CRESTOR) 10 MG tablet take 1 tablet by mouth once daily   • [DISCONTINUED] levothyroxine 75 mcg tablet Take 1 tablet (75 mcg total) by mouth daily in the early morning Take one tablet daily on empty stomach 1 hour before breakfast and 4 hours apart from calcium and vitamin D supplementation   • [DISCONTINUED] phentermine (ADIPEX-P) 37.5 MG tablet Take 1 tablet (37.5 mg total) by mouth in the morning   • [DISCONTINUED] budesonide (Pulmicort Flexhaler) 90 MCG/ACT inhaler Inhale 1 puff 2 (two) times a day Rinse mouth after use. (Patient not taking: Reported on 10/10/2022)     Allergies   Allergen Reactions   • Sulfa Antibiotics Hives and Rash       There is no immunization history on file for this patient.     Objective     /84 (BP Location: Left arm, Patient Position: Sitting, Cuff Size: Standard)   Pulse 80   Temp (!) 95.2 °F (35.1 °C) (Tympanic)   Resp 16   Ht 5' 4" (1.626 m)   Wt 67.1 kg (148 lb)   SpO2 99% BMI 25.40 kg/m²     Physical Exam  Vitals and nursing note reviewed. Constitutional:       Appearance: She is well-developed. HENT:      Head: Normocephalic and atraumatic. Eyes:      Pupils: Pupils are equal, round, and reactive to light. Cardiovascular:      Rate and Rhythm: Normal rate and regular rhythm. Heart sounds: Normal heart sounds. Pulmonary:      Effort: Pulmonary effort is normal.      Breath sounds: Normal breath sounds. Abdominal:      General: Bowel sounds are normal.      Palpations: Abdomen is soft. Musculoskeletal:         General: Normal range of motion. Cervical back: Normal range of motion and neck supple. Lymphadenopathy:      Cervical: No cervical adenopathy. Skin:     General: Skin is warm. Neurological:      Mental Status: She is alert and oriented to person, place, and time. Cranial Nerves: No cranial nerve deficit.        Eugenia Nelson MD

## 2023-08-29 NOTE — ASSESSMENT & PLAN NOTE
I am going to check a rheumatoid factor, AVIS, uric acid and CRP.   I will consider referral to see a rheumatologist.

## 2023-08-29 NOTE — ASSESSMENT & PLAN NOTE
She lost 2 pounds since her last visit. It was discussed about low-carb diet and regular exercise. She was given refill for phentermine. Come back in 1 month.

## 2023-08-29 NOTE — ED ATTENDING ATTESTATION
8/29/2023  IEric DO, saw and evaluated the patient. I have discussed the patient with the resident/non-physician practitioner and agree with the resident's/non-physician practitioner's findings, Plan of Care, and MDM as documented in the resident's/non-physician practitioner's note, except where noted. All available labs and Radiology studies were reviewed. I was present for key portions of any procedure(s) performed by the resident/non-physician practitioner and I was immediately available to provide assistance. At this point I agree with the current assessment done in the Emergency Department.   I have conducted an independent evaluation of this patient a history and physical is as follows:    Non-traumatic left elbow pain  Consistent with aseptic bursitis  Not clinically concerning for cellulitis or septic bursitis  NSAIDS, pcp f/u    ED Course         Critical Care Time  Procedures

## 2023-08-29 NOTE — ASSESSMENT & PLAN NOTE
Not well controlled. I am going to decrease her levothyroxine to 50 mcg daily. We will continue to monitor. Repeat the blood work in 2 months.   Continue to follow-up with endocrinologist.

## 2023-08-29 NOTE — TELEPHONE ENCOUNTER
Hi, this is Tia Oliva. I was actually at the office earlier today for an appointment and I had told Doctor Bekah Balbuena my left elbow hurt a little bit. We were working on some other blood tests and stuff so we really didn't look at it. But as I'm working around the house, my elbow is getting very painful and red equaling and I'm not sure what I should do. My number [phone]. Thank you. Heydi Du again. I think I might just head to [facility] in [facility], which is close to my home, to have them check out my elbow. Thank you.  Bye.

## 2023-09-07 ENCOUNTER — TELEPHONE (OUTPATIENT)
Dept: FAMILY MEDICINE CLINIC | Facility: CLINIC | Age: 58
End: 2023-09-07

## 2023-09-07 DIAGNOSIS — R76.8 POSITIVE ANA (ANTINUCLEAR ANTIBODY): Primary | ICD-10-CM

## 2023-09-08 NOTE — TELEPHONE ENCOUNTER
Her blood work came back showing her AVIS titers are positive. I recommend referral to see a rheumatologist for further work-up for possibility of lupus.

## 2023-09-12 ENCOUNTER — OFFICE VISIT (OUTPATIENT)
Dept: FAMILY MEDICINE CLINIC | Facility: CLINIC | Age: 58
End: 2023-09-12
Payer: COMMERCIAL

## 2023-09-12 VITALS
WEIGHT: 148.4 LBS | DIASTOLIC BLOOD PRESSURE: 84 MMHG | RESPIRATION RATE: 16 BRPM | TEMPERATURE: 97.8 F | HEART RATE: 71 BPM | SYSTOLIC BLOOD PRESSURE: 136 MMHG | BODY MASS INDEX: 25.33 KG/M2 | HEIGHT: 64 IN | OXYGEN SATURATION: 98 %

## 2023-09-12 DIAGNOSIS — E03.8 OTHER SPECIFIED HYPOTHYROIDISM: ICD-10-CM

## 2023-09-12 DIAGNOSIS — M70.22 OLECRANON BURSITIS OF LEFT ELBOW: Primary | ICD-10-CM

## 2023-09-12 PROCEDURE — 99214 OFFICE O/P EST MOD 30 MIN: CPT | Performed by: FAMILY MEDICINE

## 2023-09-12 RX ORDER — PREDNISONE 50 MG/1
50 TABLET ORAL DAILY
Qty: 5 TABLET | Refills: 0 | Status: SHIPPED | OUTPATIENT
Start: 2023-09-12 | End: 2023-09-17

## 2023-09-12 RX ORDER — PHENTERMINE HYDROCHLORIDE 37.5 MG/1
37.5 TABLET ORAL DAILY
Qty: 30 TABLET | Refills: 0 | Status: SHIPPED | OUTPATIENT
Start: 2023-09-12

## 2023-09-13 PROBLEM — M70.22 OLECRANON BURSITIS OF LEFT ELBOW: Status: ACTIVE | Noted: 2023-09-13

## 2023-09-13 NOTE — ASSESSMENT & PLAN NOTE
She was given prescriptions for prednisone. It was discussed about possible side effects. She was told to apply ice and Ace wrap. If not improving call or come back.

## 2023-09-13 NOTE — PROGRESS NOTES
Name: Erna Mcclain      : 1965      MRN: [id number]  Encounter Provider: Kenny English MD  Encounter Date: 2023   Encounter department: [facility]     1. Olecranon bursitis of left elbow  Assessment & Plan:  She was given prescriptions for prednisone. It was discussed about possible side effects. She was told to apply ice and Ace wrap. If not improving call or come back. Orders:  -     predniSONE 50 mg tablet; Take 1 tablet (50 mg total) by mouth daily for 5 days    2. BMI 25.0-25.9,adult  Assessment & Plan:  Not improving. Discussed about low-carb diet and regular exercise. She was given refill for phentermine 37.5 mg daily. Discussed about possible side effect. Come back in 1 month. Orders:  -     phentermine (ADIPEX-P) 37.5 MG tablet; Take 1 tablet (37.5 mg total) by mouth in the morning    3. Other specified hypothyroidism  Assessment & Plan:  Stable on levothyroxine 50 mcg. Continue same. We will continue to monitor. Subjective     She is here today for follow-up multiple medical problems and with complaint of left elbow pain. She was seen in the emergency room and was told she has olecranon bursitis. She was told to take Tylenol or ibuprofen. She stated the pain and the swelling continues to be there. She denies any recent injury. Denies any fever or chills. She continues to take her phentermine but she did not lose any weight. Review of Systems   Constitutional: Negative for chills and fever. HENT: Negative for trouble swallowing. Eyes: Negative for visual disturbance. Respiratory: Negative for cough and shortness of breath. Cardiovascular: Negative for chest pain, palpitations and leg swelling. Gastrointestinal: Negative for abdominal pain, constipation and diarrhea. Endocrine: Negative for cold intolerance and heat intolerance. Genitourinary: Negative for difficulty urinating and dysuria. Musculoskeletal: Positive for arthralgias (left elbow pain). Negative for gait problem. Skin: Negative for rash. Neurological: Negative for dizziness, tremors, seizures and headaches. Hematological: Negative for adenopathy. Psychiatric/Behavioral: Negative for behavioral problems. Past Medical History:   Diagnosis Date   • Anemia     Post gastric bypass surgery   • Asthma     Allergy induced   • Disease of thyroid gland      Past Surgical History:   Procedure Laterality Date   • CHOLECYSTECTOMY      When I had weight loss surgery   • HERNIA REPAIR      Belly Button   • REDUCTION MAMMAPLASTY Bilateral 1985     Family History   Problem Relation Age of Onset   • Cancer Mother    • Hypertension Father         Has a pacemaker as well.    • Asthma Sister    • No Known Problems Daughter    • No Known Problems Maternal Grandmother    • No Known Problems Paternal Grandmother    • No Known Problems Paternal Aunt      Social History     Socioeconomic History   • Marital status: /Civil Union     Spouse name: None   • Number of children: None   • Years of education: None   • Highest education level: None   Occupational History   • None   Tobacco Use   • Smoking status: Never   • Smokeless tobacco: Never   Vaping Use   • Vaping Use: Never used   Substance and Sexual Activity   • Alcohol use: Yes     Comment: occasional   • Drug use: Not Currently   • Sexual activity: Yes     Partners: Male     Birth control/protection: Post-menopausal   Other Topics Concern   • None   Social History Narrative   • None     Social Determinants of Health     Financial Resource Strain: Not on file   Food Insecurity: Not on file   Transportation Needs: Not on file   Physical Activity: Not on file   Stress: Not on file   Social Connections: Not on file   Intimate Partner Violence: Not on file   Housing Stability: Not on file     Current Outpatient Medications on File Prior to Visit   Medication Sig   • albuterol (PROVENTIL HFA,VENTOLIN HFA) 90 mcg/act inhaler Inhale 2 puffs every 4 (four) hours as needed for wheezing   • escitalopram (LEXAPRO) 10 mg tablet TAKE ONE TABLET BY MOUTH ONCE DAILY   • levothyroxine 50 mcg tablet Take 1 tablet (50 mcg total) by mouth daily in the early morning Take one tablet daily on empty stomach 1 hour before breakfast and 4 hours apart from calcium and vitamin D supplementation   • rosuvastatin (CRESTOR) 10 MG tablet take 1 tablet by mouth once daily   • Budeson-Glycopyrrol-Formoterol (Breztri Aerosphere) 160-9-4.8 MCG/ACT AERO Inhale 2 puffs daily Rinse mouth after use. (Patient not taking: Reported on 8/29/2023)   • [DISCONTINUED] budesonide (Pulmicort Flexhaler) 90 MCG/ACT inhaler Inhale 1 puff 2 (two) times a day Rinse mouth after use. (Patient not taking: Reported on 10/10/2022)     Allergies   Allergen Reactions   • Sulfa Antibiotics Hives and Rash       There is no immunization history on file for this patient. Objective     /84 (BP Location: Left arm, Patient Position: Sitting, Cuff Size: Standard)   Pulse 71   Temp 97.8 °F (36.6 °C) (Tympanic)   Resp 16   Ht 5' 4" (1.626 m)   Wt 67.3 kg (148 lb 6.4 oz)   SpO2 98%   BMI 25.47 kg/m²     Physical Exam  Vitals and nursing note reviewed. Constitutional:       Appearance: She is well-developed. HENT:      Head: Normocephalic and atraumatic. Eyes:      Pupils: Pupils are equal, round, and reactive to light. Cardiovascular:      Rate and Rhythm: Normal rate and regular rhythm. Heart sounds: Normal heart sounds. Pulmonary:      Effort: Pulmonary effort is normal.      Breath sounds: Normal breath sounds. Abdominal:      General: Bowel sounds are normal.      Palpations: Abdomen is soft. Musculoskeletal:         General: Swelling and tenderness (left elbow) present. Cervical back: Normal range of motion and neck supple. Lymphadenopathy:      Cervical: No cervical adenopathy. Skin:     General: Skin is warm. Neurological:      Mental Status: She is alert and oriented to person, place, and time. Cranial Nerves: No cranial nerve deficit.        Abhishek Wilson MD

## 2023-09-13 NOTE — ASSESSMENT & PLAN NOTE
Not improving. Discussed about low-carb diet and regular exercise. She was given refill for phentermine 37.5 mg daily. Discussed about possible side effect. Come back in 1 month.

## 2023-10-05 ENCOUNTER — TELEPHONE (OUTPATIENT)
Dept: FAMILY MEDICINE CLINIC | Facility: CLINIC | Age: 58
End: 2023-10-05

## 2023-10-05 DIAGNOSIS — E03.8 OTHER SPECIFIED HYPOTHYROIDISM: Primary | ICD-10-CM

## 2023-10-05 NOTE — TELEPHONE ENCOUNTER
I just ordered repeat TSH and free T4. Her levothyroxine was changed recently and lab is due to be repeated.

## 2023-10-10 ENCOUNTER — OFFICE VISIT (OUTPATIENT)
Dept: FAMILY MEDICINE CLINIC | Facility: CLINIC | Age: 58
End: 2023-10-10
Payer: COMMERCIAL

## 2023-10-10 VITALS
WEIGHT: 146.2 LBS | DIASTOLIC BLOOD PRESSURE: 76 MMHG | HEART RATE: 71 BPM | OXYGEN SATURATION: 98 % | RESPIRATION RATE: 16 BRPM | HEIGHT: 64 IN | SYSTOLIC BLOOD PRESSURE: 128 MMHG | TEMPERATURE: 95.8 F | BODY MASS INDEX: 24.96 KG/M2

## 2023-10-10 DIAGNOSIS — G62.9 NEUROPATHY: ICD-10-CM

## 2023-10-10 DIAGNOSIS — G56.03 BILATERAL CARPAL TUNNEL SYNDROME: Primary | ICD-10-CM

## 2023-10-10 DIAGNOSIS — E03.8 OTHER SPECIFIED HYPOTHYROIDISM: ICD-10-CM

## 2023-10-10 PROCEDURE — 99214 OFFICE O/P EST MOD 30 MIN: CPT | Performed by: FAMILY MEDICINE

## 2023-10-10 RX ORDER — DICLOFENAC SODIUM 75 MG/1
75 TABLET, DELAYED RELEASE ORAL 2 TIMES DAILY
Qty: 60 TABLET | Refills: 1 | Status: SHIPPED | OUTPATIENT
Start: 2023-10-10

## 2023-10-10 RX ORDER — PHENTERMINE HYDROCHLORIDE 37.5 MG/1
37.5 TABLET ORAL DAILY
Qty: 30 TABLET | Refills: 0 | Status: SHIPPED | OUTPATIENT
Start: 2023-10-10

## 2023-10-10 RX ORDER — GABAPENTIN 100 MG/1
100 CAPSULE ORAL 3 TIMES DAILY
Qty: 90 CAPSULE | Refills: 0 | Status: SHIPPED | OUTPATIENT
Start: 2023-10-10

## 2023-10-14 PROBLEM — G62.9 NEUROPATHY: Status: ACTIVE | Noted: 2023-10-14

## 2023-10-14 NOTE — ASSESSMENT & PLAN NOTE
Improving. Continue on phentermine but she was told to take half tablet daily. Come back in 2 months. Continue to follow low-carb diet and regular exercise.

## 2023-10-14 NOTE — PROGRESS NOTES
Name: Dickson Kaur      : 1965      MRN: [id number]  Encounter Provider: Stiven Jonas MD  Encounter Date: 10/10/2023   Encounter department: [facility]     1. Bilateral carpal tunnel syndrome  Assessment & Plan:  She was given a prescription for diclofenac and Neurontin. Discussed the possible side effect. Referral to see hand surgeon. Orders:  -     Ambulatory Referral to Hand Surgery; Future  -     gabapentin (Neurontin) 100 mg capsule; Take 1 capsule (100 mg total) by mouth 3 (three) times a day  -     diclofenac (VOLTAREN) 75 mg EC tablet; Take 1 tablet (75 mg total) by mouth 2 (two) times a day    2. BMI 25.0-25.9,adult  Assessment & Plan:  Improving. Continue on phentermine but she was told to take half tablet daily. Come back in 2 months. Continue to follow low-carb diet and regular exercise. Orders:  -     phentermine (ADIPEX-P) 37.5 MG tablet; Take 1 tablet (37.5 mg total) by mouth in the morning    3. Neuropathy  Assessment & Plan:  She was given a prescription for gabapentin to take at bedtime. Referral to see hand surgeon. 4. Other specified hypothyroidism  Assessment & Plan:  Her last TSH was low. She has been taking her levothyroxine 50 mcg daily. I will repeat her TSH with free T4. Come back in 2 months for follow-up. Subjective     She is here today for follow-up multiple medical problems. She continues on phentermine and she lost 2 pounds since her last visit. Today she complains of pain in her hand bilaterally it has been getting worse. She was supposed to get carpal tunnel surgery but it was postponed by her surgeon. She is requesting a second opinion. She stated her pain has been bad and has not been able to sleep at night. Review of Systems   Constitutional:  Negative for chills and fever. HENT:  Negative for trouble swallowing. Eyes:  Negative for visual disturbance.    Respiratory: Negative for cough and shortness of breath. Cardiovascular:  Negative for chest pain, palpitations and leg swelling. Gastrointestinal:  Negative for abdominal pain, constipation and diarrhea. Endocrine: Negative for cold intolerance and heat intolerance. Genitourinary:  Negative for difficulty urinating and dysuria. Musculoskeletal:  Positive for arthralgias. Negative for gait problem. Skin:  Negative for rash. Neurological:  Positive for numbness. Negative for dizziness, tremors, seizures and headaches. Hematological:  Negative for adenopathy. Psychiatric/Behavioral:  Negative for behavioral problems. Past Medical History:   Diagnosis Date   • Anemia     Post gastric bypass surgery   • Asthma     Allergy induced   • Disease of thyroid gland      Past Surgical History:   Procedure Laterality Date   • CHOLECYSTECTOMY      When I had weight loss surgery   • HERNIA REPAIR      Belly Button   • REDUCTION MAMMAPLASTY Bilateral 1985     Family History   Problem Relation Age of Onset   • Cancer Mother    • Hypertension Father         Has a pacemaker as well.    • Asthma Sister    • No Known Problems Daughter    • No Known Problems Maternal Grandmother    • No Known Problems Paternal Grandmother    • No Known Problems Paternal Aunt      Social History     Socioeconomic History   • Marital status: /Civil Union     Spouse name: None   • Number of children: None   • Years of education: None   • Highest education level: None   Occupational History   • None   Tobacco Use   • Smoking status: Never   • Smokeless tobacco: Never   Vaping Use   • Vaping Use: Never used   Substance and Sexual Activity   • Alcohol use: Yes     Comment: occasional   • Drug use: Not Currently   • Sexual activity: Yes     Partners: Male     Birth control/protection: Post-menopausal   Other Topics Concern   • None   Social History Narrative   • None     Social Determinants of Health     Financial Resource Strain: Not on file Food Insecurity: Not on file   Transportation Needs: Not on file   Physical Activity: Not on file   Stress: Not on file   Social Connections: Not on file   Intimate Partner Violence: Not on file   Housing Stability: Not on file     Current Outpatient Medications on File Prior to Visit   Medication Sig   • albuterol (PROVENTIL HFA,VENTOLIN HFA) 90 mcg/act inhaler Inhale 2 puffs every 4 (four) hours as needed for wheezing   • escitalopram (LEXAPRO) 10 mg tablet TAKE ONE TABLET BY MOUTH ONCE DAILY   • levothyroxine 50 mcg tablet Take 1 tablet (50 mcg total) by mouth daily in the early morning Take one tablet daily on empty stomach 1 hour before breakfast and 4 hours apart from calcium and vitamin D supplementation   • rosuvastatin (CRESTOR) 10 MG tablet take 1 tablet by mouth once daily   • Budeson-Glycopyrrol-Formoterol (Breztri Aerosphere) 160-9-4.8 MCG/ACT AERO Inhale 2 puffs daily Rinse mouth after use. (Patient not taking: Reported on 8/29/2023)   • [DISCONTINUED] budesonide (Pulmicort Flexhaler) 90 MCG/ACT inhaler Inhale 1 puff 2 (two) times a day Rinse mouth after use. (Patient not taking: Reported on 10/10/2022)     Allergies   Allergen Reactions   • Sulfa Antibiotics Hives and Rash       There is no immunization history on file for this patient. Objective     /76 (BP Location: Left arm, Patient Position: Sitting, Cuff Size: Large)   Pulse 71   Temp (!) 95.8 °F (35.4 °C) (Tympanic)   Resp 16   Ht 5' 4" (1.626 m)   Wt 66.3 kg (146 lb 3.2 oz)   SpO2 98%   BMI 25.10 kg/m²     Physical Exam  Vitals and nursing note reviewed. Constitutional:       Appearance: She is well-developed. HENT:      Head: Normocephalic and atraumatic. Eyes:      Pupils: Pupils are equal, round, and reactive to light. Cardiovascular:      Rate and Rhythm: Normal rate and regular rhythm. Heart sounds: Normal heart sounds.    Pulmonary:      Effort: Pulmonary effort is normal.      Breath sounds: Normal breath sounds. Abdominal:      General: Bowel sounds are normal.      Palpations: Abdomen is soft. Musculoskeletal:         General: Tenderness present. Cervical back: Normal range of motion and neck supple. Lymphadenopathy:      Cervical: No cervical adenopathy. Skin:     General: Skin is warm. Neurological:      Mental Status: She is alert and oriented to person, place, and time.        Frank Alcaraz MD

## 2023-10-14 NOTE — ASSESSMENT & PLAN NOTE
Her last TSH was low. She has been taking her levothyroxine 50 mcg daily. I will repeat her TSH with free T4. Come back in 2 months for follow-up.

## 2023-10-14 NOTE — ASSESSMENT & PLAN NOTE
She was given a prescription for diclofenac and Neurontin. Discussed the possible side effect. Referral to see hand surgeon.

## 2023-11-04 DIAGNOSIS — G56.03 BILATERAL CARPAL TUNNEL SYNDROME: ICD-10-CM

## 2023-11-06 RX ORDER — DICLOFENAC SODIUM 75 MG/1
75 TABLET, DELAYED RELEASE ORAL 2 TIMES DAILY
Qty: 60 TABLET | Refills: 1 | Status: SHIPPED | OUTPATIENT
Start: 2023-11-06

## 2023-11-09 ENCOUNTER — CONSULT (OUTPATIENT)
Dept: RHEUMATOLOGY | Facility: CLINIC | Age: 58
End: 2023-11-09
Payer: COMMERCIAL

## 2023-11-09 VITALS
BODY MASS INDEX: 24.92 KG/M2 | HEART RATE: 75 BPM | DIASTOLIC BLOOD PRESSURE: 68 MMHG | OXYGEN SATURATION: 99 % | WEIGHT: 146 LBS | SYSTOLIC BLOOD PRESSURE: 118 MMHG | HEIGHT: 64 IN

## 2023-11-09 DIAGNOSIS — R76.8 POSITIVE ANA (ANTINUCLEAR ANTIBODY): ICD-10-CM

## 2023-11-09 PROCEDURE — 99204 OFFICE O/P NEW MOD 45 MIN: CPT | Performed by: INTERNAL MEDICINE
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
## 2023-11-14 DIAGNOSIS — E03.9 HYPOTHYROIDISM, UNSPECIFIED TYPE: Primary | ICD-10-CM

## 2023-11-14 RX ORDER — LEVOTHYROXINE SODIUM 0.07 MG/1
TABLET ORAL
Qty: 90 TABLET | Refills: 1 | Status: SHIPPED | OUTPATIENT
Start: 2023-11-14

## 2023-12-05 DIAGNOSIS — E03.9 HYPOTHYROIDISM, UNSPECIFIED TYPE: Primary | ICD-10-CM

## 2023-12-05 RX ORDER — LEVOTHYROXINE SODIUM 0.07 MG/1
TABLET ORAL
Qty: 30 TABLET | Refills: 3 | Status: SHIPPED | OUTPATIENT
Start: 2023-12-05

## 2023-12-16 DIAGNOSIS — G56.03 BILATERAL CARPAL TUNNEL SYNDROME: ICD-10-CM

## 2023-12-18 RX ORDER — DICLOFENAC SODIUM 75 MG/1
TABLET, DELAYED RELEASE ORAL
Qty: 60 TABLET | Refills: 1 | Status: SHIPPED | OUTPATIENT
Start: 2023-12-18

## 2023-12-27 ENCOUNTER — TELEPHONE (OUTPATIENT)
Dept: FAMILY MEDICINE CLINIC | Facility: CLINIC | Age: 58
End: 2023-12-27

## 2023-12-27 ENCOUNTER — TELEPHONE (OUTPATIENT)
Dept: RHEUMATOLOGY | Facility: CLINIC | Age: 58
End: 2023-12-27

## 2023-12-27 NOTE — TELEPHONE ENCOUNTER
LM for pt letting them know there is a template change for upcoming appointments in 2024. Pt appointment was changed on 5/9/24 from 9:30 to 10. Told pt to call back if there are any issues w the time change.

## 2023-12-27 NOTE — TELEPHONE ENCOUNTER
----- Message from Dalia Loja sent at 12/27/2023 11:10 AM EST -----  Regarding: Covid   Contact: [phone]  Just letting Dr. Mays know I tested positive for Covid December 25th

## 2024-01-02 ENCOUNTER — OFFICE VISIT (OUTPATIENT)
Dept: FAMILY MEDICINE CLINIC | Facility: CLINIC | Age: 59
End: 2024-01-02
Payer: COMMERCIAL

## 2024-01-02 VITALS
DIASTOLIC BLOOD PRESSURE: 80 MMHG | HEIGHT: 64 IN | SYSTOLIC BLOOD PRESSURE: 126 MMHG | WEIGHT: 148.6 LBS | BODY MASS INDEX: 25.37 KG/M2 | RESPIRATION RATE: 16 BRPM | OXYGEN SATURATION: 98 % | TEMPERATURE: 97.6 F | HEART RATE: 79 BPM

## 2024-01-02 DIAGNOSIS — D50.8 OTHER IRON DEFICIENCY ANEMIA: ICD-10-CM

## 2024-01-02 DIAGNOSIS — Z00.01 ABNORMAL WELLNESS EXAM: ICD-10-CM

## 2024-01-02 DIAGNOSIS — E78.49 OTHER HYPERLIPIDEMIA: Primary | ICD-10-CM

## 2024-01-02 DIAGNOSIS — E03.8 OTHER SPECIFIED HYPOTHYROIDISM: ICD-10-CM

## 2024-01-02 DIAGNOSIS — Z12.31 SCREENING MAMMOGRAM, ENCOUNTER FOR: ICD-10-CM

## 2024-01-02 PROCEDURE — 99214 OFFICE O/P EST MOD 30 MIN: CPT | Performed by: FAMILY MEDICINE

## 2024-01-02 PROCEDURE — 99396 PREV VISIT EST AGE 40-64: CPT | Performed by: FAMILY MEDICINE

## 2024-01-02 RX ORDER — PHENTERMINE HYDROCHLORIDE 37.5 MG/1
37.5 TABLET ORAL DAILY
Qty: 30 TABLET | Refills: 0 | Status: SHIPPED | OUTPATIENT
Start: 2024-01-02

## 2024-01-02 NOTE — PROGRESS NOTES
Name: Dalia Loja      : 1965      MRN: [id number]  Encounter Provider: Henry Mays MD  Encounter Date: 2024   Encounter department: [facility] [address] [facility]    Assessment & Plan     1. Other hyperlipidemia  Assessment & Plan:    Stable.  Continue on Crestor 10 mg daily.  We will continue to monitor fasting lipid profile.    Orders:  -     CBC and differential; Future  -     Comprehensive metabolic panel; Future  -     Lipid Panel with Direct LDL reflex; Future  -     TSH, 3rd generation with Free T4 reflex; Future    2. Other specified hypothyroidism  -     TSH, 3rd generation with Free T4 reflex; Future    3. Screening mammogram, encounter for  -     Mammo screening bilateral w 3d & cad; Future; Expected date: 2024    4. BMI 25.0-25.9,adult  Assessment & Plan:  Stable on phentermine 37.5 mg daily.  She has not been taking it on daily basis.  She has been maintaining her weight.  Continue to follow low-carb diet and regular exercise.    Orders:  -     phentermine (ADIPEX-P) 37.5 MG tablet; Take 1 tablet (37.5 mg total) by mouth in the morning    5. Abnormal wellness exam  Assessment & Plan:  It was discussed about immunizations, diet, exercise and safety measures.      6. Other iron deficiency anemia  Assessment & Plan:  Stable.  Continue same and continue to monitor her CBC and iron panel.    Orders:  -     CBC and differential; Future  -     Iron Panel (Includes Ferritin, Iron Sat%, Iron, and TIBC); Future           Subjective     She is here today for follow-up multiple medical problems.  She has been taking her medications.  She denies any side effect of her medications.  She has been following with endocrinologist for her hypothyroidism.  Her TSH was elevated at 27.  Her levothyroxine was adjusted.  She had recent COVID around [place] and she has been feeling better.  She denies any other complaint.      Review of Systems   Constitutional:  Negative for chills and  fever.   HENT:  Negative for trouble swallowing.    Eyes:  Negative for visual disturbance.   Respiratory:  Negative for cough and shortness of breath.    Cardiovascular:  Negative for chest pain, palpitations and leg swelling.   Gastrointestinal:  Negative for abdominal pain, constipation and diarrhea.   Endocrine: Negative for cold intolerance and heat intolerance.   Genitourinary:  Negative for difficulty urinating and dysuria.   Musculoskeletal:  Negative for gait problem.   Skin:  Negative for rash.   Neurological:  Negative for dizziness, tremors, seizures and headaches.   Hematological:  Negative for adenopathy.   Psychiatric/Behavioral:  Negative for behavioral problems.        Past Medical History:   Diagnosis Date   • Anemia     Post gastric bypass surgery   • Asthma     Allergy induced   • Disease of thyroid gland      Past Surgical History:   Procedure Laterality Date   • CHOLECYSTECTOMY      When I had weight loss surgery   • HERNIA REPAIR      Belly Button   • REDUCTION MAMMAPLASTY Bilateral 1985     Family History   Problem Relation Age of Onset   • Cancer Mother    • Hypertension Father         Has a pacemaker as well.   • Asthma Sister    • No Known Problems Daughter    • No Known Problems Maternal Grandmother    • No Known Problems Paternal Grandmother    • No Known Problems Paternal Aunt      Social History     Socioeconomic History   • Marital status: /Civil Union     Spouse name: None   • Number of children: None   • Years of education: None   • Highest education level: None   Occupational History   • None   Tobacco Use   • Smoking status: Never   • Smokeless tobacco: Never   Vaping Use   • Vaping status: Never Used   Substance and Sexual Activity   • Alcohol use: Yes     Comment: occasional   • Drug use: Not Currently   • Sexual activity: Yes     Partners: Male     Birth control/protection: Post-menopausal   Other Topics Concern   • None   Social History Narrative   • None     Social  "Determinants of Health     Financial Resource Strain: Not on file   Food Insecurity: Not on file   Transportation Needs: Not on file   Physical Activity: Not on file   Stress: Not on file   Social Connections: Not on file   Intimate Partner Violence: Not on file   Housing Stability: Not on file     Current Outpatient Medications on File Prior to Visit   Medication Sig   • albuterol (PROVENTIL HFA,VENTOLIN HFA) 90 mcg/act inhaler Inhale 2 puffs every 4 (four) hours as needed for wheezing   • diclofenac (VOLTAREN) 75 mg EC tablet TAKE ONE TABLET (75 MG TOTAL) BY MOUTH TWO TIMES A DAY   • escitalopram (LEXAPRO) 10 mg tablet TAKE ONE TABLET BY MOUTH ONCE DAILY   • levothyroxine 75 mcg tablet Take one tablet daily on empty stomach 1 hour before breakfast and 4 hours apart from calcium and vitamin D supplementation   • rosuvastatin (CRESTOR) 10 MG tablet take 1 tablet by mouth once daily   • Budeson-Glycopyrrol-Formoterol (Breztri Aerosphere) 160-9-4.8 MCG/ACT AERO Inhale 2 puffs daily Rinse mouth after use. (Patient taking differently: Inhale 2 puffs if needed Rinse mouth after use.)   • gabapentin (Neurontin) 100 mg capsule Take 1 capsule (100 mg total) by mouth 3 (three) times a day (Patient not taking: Reported on 11/9/2023)   • [DISCONTINUED] budesonide (Pulmicort Flexhaler) 90 MCG/ACT inhaler Inhale 1 puff 2 (two) times a day Rinse mouth after use. (Patient not taking: Reported on 10/10/2022)   • [DISCONTINUED] phentermine (ADIPEX-P) 37.5 MG tablet Take 1 tablet (37.5 mg total) by mouth in the morning (Patient not taking: Reported on 1/2/2024)     Allergies   Allergen Reactions   • Sulfa Antibiotics Hives and Rash   • Other Other (See Comments)     Seasonal allergies, Cats.        There is no immunization history on file for this patient.    Objective     /80 (BP Location: Left arm, Patient Position: Sitting, Cuff Size: Standard)   Pulse 79   Temp 97.6 °F (36.4 °C) (Tympanic)   Resp 16   Ht 5' 4\" (1.626 m) "   Wt 67.4 kg (148 lb 9.6 oz)   SpO2 98%   BMI 25.51 kg/m²     Physical Exam  Vitals and nursing note reviewed.   Constitutional:       Appearance: She is well-developed.   HENT:      Head: Normocephalic and atraumatic.   Eyes:      Pupils: Pupils are equal, round, and reactive to light.   Cardiovascular:      Rate and Rhythm: Normal rate and regular rhythm.      Heart sounds: Normal heart sounds.   Pulmonary:      Effort: Pulmonary effort is normal.      Breath sounds: Normal breath sounds.   Abdominal:      General: Bowel sounds are normal.      Palpations: Abdomen is soft.   Musculoskeletal:      Cervical back: Normal range of motion and neck supple.   Lymphadenopathy:      Cervical: No cervical adenopathy.   Skin:     General: Skin is warm.   Neurological:      Mental Status: She is alert and oriented to person, place, and time.       Henry Mays MD

## 2024-01-02 NOTE — ASSESSMENT & PLAN NOTE
Stable on phentermine 37.5 mg daily.  She has not been taking it on daily basis.  She has been maintaining her weight.  Continue to follow low-carb diet and regular exercise.

## 2024-01-09 ENCOUNTER — TELEPHONE (OUTPATIENT)
Dept: PULMONOLOGY | Facility: CLINIC | Age: 59
End: 2024-01-09

## 2024-01-25 DIAGNOSIS — F41.9 ANXIETY: ICD-10-CM

## 2024-01-25 RX ORDER — ESCITALOPRAM OXALATE 10 MG/1
10 TABLET ORAL DAILY
Qty: 90 TABLET | Refills: 1 | Status: SHIPPED | OUTPATIENT
Start: 2024-01-25

## 2024-01-26 NOTE — TELEPHONE ENCOUNTER
Refills have been requested for the following medications:         escitalopram (LEXAPRO) 10 mg tablet [Wassim Abosamra]         phentermine (ADIPEX-P) 37.5 MG tablet [Wassim Abosamra]     Preferred pharmacy: [facility] - [facility] [address] [place]    Last seen 1/2/24  Has appt 4/2/24     1 STEPHEN LOJA 1965 F [address] [place], [place] [id number] [facility]      Search Criteria  NAME DATE OF BIRTH DATE RANGE  Stephen Loja 1965 01- To 01-  REQUESTER NAME REQUESTED DATE  WASSIM ABOSAMRA Fri Jan 26 2024 08:18:55 GMT-0500 (Eastern Standard Time)  Summary  Prescriptions  7  Prescribers  1  Pharmacies  2  Drug Classes  Benzodiazepines  0  Stimulants  7  Opioids  0  Muscle Relaxants  0  Opioid Dosage  Total MME for Active Prescriptions  0    Average MME  0.00         Prescriptions  Notifications    Prescribers  Pharmacies  MME Graph    Show All     [place]   Drug Categories:      Stimulants     Show  10  entries  Search:  PATIENT ID PRESCRIPTION # FILLED WRITTEN DRUG LABEL QTY DAYS STRENGTH MME** PRESCRIBER PHARMACY PAYMENT REFILL #/AUTH STATE DETAIL  1 [id number] 10/10/2023 10/10/2023 Phentermine Hcl (Tablet) 30.0 30 37.5 MG NA MIMI) [facility] [employer] [facility] 0 / 0 [place]   1 [id number] 09/02/2023 08/29/2023 Phentermine Hcl (Capsule) 30.0 30 37.5 MG NA MIMI) [facility] [employer] 0 / 0 [place]   1 [id number] 08/01/2023 08/01/2023 Phentermine Hcl (Tablet) 30.0 30 37.5 MG NA MIMI) [facility] [employer] 0 / 0 [place]   1 [id number] 05/30/2023 05/30/2023 Phentermine Hcl (Tablet) 30.0 30 37.5 MG NA MIMI) [facility] [employer] 0 / 0 [place]   1 [id number] 04/18/2023 04/18/2023 Phentermine Hcl (Capsule) 30.0 30 37.5 MG NA MIMI) [facility] [employer] 0 / 0 [place]   [id number] 03/22/2023 03/20/2023 Phentermine Hcl (Capsule) 30.0 30 37.5 MG NA MIMI) [facility] 0 / 0 [place]   1 [id number] 02/17/2023 02/16/2023 Phentermine Hcl (Capsule) 30.0 30 37.5 MG NA MIMI) [facility]

## 2024-01-27 RX ORDER — PHENTERMINE HYDROCHLORIDE 37.5 MG/1
37.5 TABLET ORAL DAILY
Qty: 30 TABLET | Refills: 0 | Status: SHIPPED | OUTPATIENT
Start: 2024-01-27

## 2024-02-06 DIAGNOSIS — G56.03 BILATERAL CARPAL TUNNEL SYNDROME: ICD-10-CM

## 2024-02-06 RX ORDER — DICLOFENAC SODIUM 75 MG/1
TABLET, DELAYED RELEASE ORAL
Qty: 60 TABLET | Refills: 1 | Status: SHIPPED | OUTPATIENT
Start: 2024-02-06

## 2024-02-21 DIAGNOSIS — E03.9 HYPOTHYROIDISM, UNSPECIFIED TYPE: ICD-10-CM

## 2024-02-21 RX ORDER — LEVOTHYROXINE SODIUM 0.07 MG/1
TABLET ORAL
Qty: 30 TABLET | Refills: 3 | Status: SHIPPED | OUTPATIENT
Start: 2024-02-21

## 2024-02-26 ENCOUNTER — TELEPHONE (OUTPATIENT)
Dept: FAMILY MEDICINE CLINIC | Facility: CLINIC | Age: 59
End: 2024-02-26

## 2024-02-26 DIAGNOSIS — R19.7 DIARRHEA, UNSPECIFIED TYPE: Primary | ICD-10-CM

## 2024-02-26 NOTE — TELEPHONE ENCOUNTER
Check stool culture and she can follow brat diet in the meanwhile.  She can take Imodium if needed.

## 2024-02-26 NOTE — TELEPHONE ENCOUNTER
----- Message from Erlinda Soria sent at 2/26/2024  9:31 AM EST -----  Regarding: FW: Traveler’s Diarrhea   Contact: [phone]    ----- Message -----  From: Dalia Loja  Sent: 2/26/2024   9:01 AM EST  To: [facility]  Subject: Traveler’s Diarrhea                              Good morning,  I was in the [place] February 14-20. During my trip I developed diarrhea. It’s still going on and not sure what to take. I’ve been using pepto bismol since I got home. It does help with nausea etc a bit, but it hasn’t stopped it.

## 2024-03-05 ENCOUNTER — TELEPHONE (OUTPATIENT)
Dept: FAMILY MEDICINE CLINIC | Facility: CLINIC | Age: 59
End: 2024-03-05

## 2024-03-14 ENCOUNTER — OFFICE VISIT (OUTPATIENT)
Dept: ENDOCRINOLOGY | Facility: CLINIC | Age: 59
End: 2024-03-14
Payer: COMMERCIAL

## 2024-03-14 VITALS
WEIGHT: 152 LBS | DIASTOLIC BLOOD PRESSURE: 68 MMHG | OXYGEN SATURATION: 98 % | BODY MASS INDEX: 25.95 KG/M2 | HEART RATE: 85 BPM | HEIGHT: 64 IN | SYSTOLIC BLOOD PRESSURE: 112 MMHG

## 2024-03-14 DIAGNOSIS — M85.80 OSTEOPENIA, UNSPECIFIED LOCATION: ICD-10-CM

## 2024-03-14 DIAGNOSIS — E03.9 HYPOTHYROIDISM, UNSPECIFIED TYPE: Primary | ICD-10-CM

## 2024-03-14 DIAGNOSIS — Z78.0 POST-MENOPAUSAL: ICD-10-CM

## 2024-03-14 DIAGNOSIS — Z98.84 S/P BARIATRIC SURGERY: ICD-10-CM

## 2024-03-14 PROCEDURE — 99214 OFFICE O/P EST MOD 30 MIN: CPT | Performed by: STUDENT IN AN ORGANIZED HEALTH CARE EDUCATION/TRAINING PROGRAM

## 2024-03-14 NOTE — PROGRESS NOTES
Dalia Loja 58 y.o. female MRN: [id number]    Encounter: [id number]      Assessment/Plan     1. Hypothyroidism - last TSH was elevated. Dalia is clinically stable on present rx. We are awaiting follow up labs for thyroid testing. I will contact her with results and recommendations    2. History of maria c-en-y surgery - Continue bariatric MVI plus calcium citrate. Follow up CMP and iPTH    3. Vit D deficiency - follow up repeat vit D. Continue replacement    4. Mixed hyperlipidemia - improved on statin.    5. Osteopenia - will plan for repeat DXA Aug 2024    Orders Placed This Encounter   Procedures   • DXA bone density spine hip and pelvis   • TSH, 3rd generation   • T4, free   • Comprehensive metabolic panel   • Vitamin D 25 hydroxy   • PTH, intact       RTC 6-mo    CC: Hypothyroidism, s/p RYGB    History of Present Illness     HPI:    Dalia returns today for follow up of hypothyroidism & s/p RYBG.    Dalia has recent history notable for carpal tunnel surgery and dental surgery. She will be having additional surgery in future. During this time, she has been holding phentermine, but has plans to resume once surgery finalized. She has goal weight ~ 130 lb.     For hypothyroidism, Dalia remains on LT4 75 mcg daily and denies any thyroidal symptoms.     For post RYGB, she takes MVI, calcium citrate, and Vit D supplementation. Her last DXA showed osteopenia in Aug 2022.     Dalia is also taking crestor 10 mg daily. She has had improvement of her cholesterol on therapy.     Review of Systems   Constitutional:  Negative for diaphoresis and unexpected weight change.   HENT:  Negative for trouble swallowing and voice change.    Cardiovascular:  Negative for chest pain and palpitations.   Gastrointestinal:  Negative for nausea and vomiting.   Endocrine: Negative for cold intolerance and heat intolerance.   Neurological:  Negative for tremors.   All other systems reviewed and are negative.      Historical Information    Past Medical History:   Diagnosis Date   • Anemia     Post gastric bypass surgery   • Asthma     Allergy induced   • Disease of thyroid gland      Past Surgical History:   Procedure Laterality Date   • CHOLECYSTECTOMY      When I had weight loss surgery   • HERNIA REPAIR      Belly Button   • REDUCTION MAMMAPLASTY Bilateral 1985     Social History   Social History     Substance and Sexual Activity   Alcohol Use Yes    Comment: occasional     Social History     Substance and Sexual Activity   Drug Use Not Currently     Social History     Tobacco Use   Smoking Status Never   Smokeless Tobacco Never     Family History:   Family History   Problem Relation Age of Onset   • Cancer Mother    • Hypertension Father         Has a pacemaker as well.   • Asthma Sister    • No Known Problems Daughter    • No Known Problems Maternal Grandmother    • No Known Problems Paternal Grandmother    • No Known Problems Paternal Aunt        Meds/Allergies   Current Outpatient Medications   Medication Sig Dispense Refill   • albuterol (PROVENTIL HFA,VENTOLIN HFA) 90 mcg/act inhaler Inhale 2 puffs every 4 (four) hours as needed for wheezing 6.7 g 0   • Budeson-Glycopyrrol-Formoterol (Breztri Aerosphere) 160-9-4.8 MCG/ACT AERO Inhale 2 puffs daily Rinse mouth after use. (Patient taking differently: Inhale 2 puffs if needed Rinse mouth after use.) 10.7 g 0   • levothyroxine 75 mcg tablet TAKE ONE TABLET DAILY ON EMPTY STOMACH 1 HOUR BEFORE BREAKFAST AND 4 HOURS APART FROM CALCIUM AND VITAMIN D SUPPLEMENTATION 30 tablet 3   • rosuvastatin (CRESTOR) 10 MG tablet take 1 tablet by mouth once daily 90 tablet 3   • diclofenac (VOLTAREN) 75 mg EC tablet TAKE ONE TABLET (75 MG TOTAL) BY MOUTH TWO TIMES A DAY (Patient not taking: Reported on 3/14/2024) 60 tablet 1   • escitalopram (LEXAPRO) 10 mg tablet Take 1 tablet (10 mg total) by mouth daily 90 tablet 1   • gabapentin (Neurontin) 100 mg capsule Take 1 capsule (100 mg total) by mouth 3 (three)  "times a day (Patient not taking: Reported on 11/9/2023) 90 capsule 0   • phentermine (ADIPEX-P) 37.5 MG tablet Take 1 tablet (37.5 mg total) by mouth in the morning (Patient not taking: Reported on 3/14/2024) 30 tablet 0     No current facility-administered medications for this visit.     Allergies   Allergen Reactions   • Sulfa Antibiotics Hives and Rash   • Other Other (See Comments)     Seasonal allergies, Cats.        Objective   Vitals: Blood pressure 112/68, pulse 85, height 5' 4\" (1.626 m), weight 68.9 kg (152 lb), SpO2 98%.    Physical Exam  Vitals reviewed.   Constitutional:       Appearance: Normal appearance.   HENT:      Head: Normocephalic and atraumatic.      Nose: Nose normal.   Eyes:      General: No scleral icterus.     Conjunctiva/sclera: Conjunctivae normal.      Comments: Heterochromia    Neck:      Thyroid: No thyroid mass, thyromegaly or thyroid tenderness.   Pulmonary:      Effort: Pulmonary effort is normal. No respiratory distress.   Musculoskeletal:      Cervical back: Normal range of motion.   Lymphadenopathy:      Cervical: No cervical adenopathy.   Skin:     General: Skin is warm and dry.   Neurological:      General: No focal deficit present.      Mental Status: She is alert.   Psychiatric:         Mood and Affect: Mood normal.         Behavior: Behavior normal.         The history was obtained from the review of the chart, patient.    Lab Results:     Component  Ref Range & Units 1/16/23  8:43 AM 9/13/21  9:40 AM 9/26/18  8:55 AM   TSH  0.36 - 3.74 uIU/mL 7.39 High  2.42 CM 0.03 Low      Component  Ref Range & Units 11/13/23  9:53 AM   T4, Free  0.61 - 1.12 ng/dL 0.75          Imaging Studies:   ?  8/17/2022    CENTRAL  DXA SCAN     CLINICAL HISTORY:  57-year-old postmenopausal female.   OTHER RISK FACTORS:  SSRI therapy.     PHARMACOLOGIC THERAPY FOR OSTEOPOROSIS:  None.     TECHNIQUE: Bone densitometry was performed using a GE Lunar Prodigy  bone densitometer.  Regions of interest " "appear properly placed.      COMPARISON: 2/20/2017.     RESULTS:      LUMBAR SPINE  Level: L1-L4 :   BMD:  0.91  gm/cm2   T-score: -2.3         LEFT TOTAL HIP:   BMD: 0.798  gm/cm2   T-score: -1.7      LEFT FEMORAL NECK:   BMD: 0.725  gm/cm2   T score: -2.3      RIGHT TOTAL HIP:   BMD:  0.801  gm/cm2   T-score: -1.6     RIGHT FEMORAL NECK:   BMD:  0.721  gm/cm2    T score: -2.3         IMPRESSION:     1.  Low bone mass (osteopenia).     2.  Since a DXA study from 2/20/2017, there has been:  A  STATISTICALLY SIGNIFICANT INCREASE in bone mineral density of  0.017 g/cm2 (2.2)% in the hips.           3.  The 10 year risk of hip fracture is 1.4% with the 10 year risk of major osteoporotic fracture being 8.8% as calculated by the [employer]/WHO fracture risk assessment tool (FRAX).     4.  The current NOF guidelines recommend treating patients with a T-score of -2.5 or less in the lumbar spine or hips, or in post-menopausal women and men over the age of 50 with low bone mass (osteopenia) and a FRAX 10 year risk score of >3% for hip   fracture and/or >20% for major osteoporotic fracture.     5.  The NOF recommends follow-up DXA in 1-2 years after initiating therapy for osteoporosis and every 2 years thereafter. More frequent evaluation is appropriate for patients with conditions associated with rapid bone loss, such as glucocorticoid   therapy. The interval between DXA screenings may be longer for individuals without major risk factors and initial T-score in the normal or upper low bone mass range.    I have personally reviewed pertinent reports.      Portions of the record may have been created with voice recognition software. Occasional wrong word or \"sound a like\" substitutions may have occurred due to the inherent limitations of voice recognition software. Read the chart carefully and recognize, using context, where substitutions have occurred.  "

## 2024-03-18 ENCOUNTER — TELEPHONE (OUTPATIENT)
Dept: ENDOCRINOLOGY | Facility: CLINIC | Age: 59
End: 2024-03-18

## 2024-03-18 DIAGNOSIS — E03.9 HYPOTHYROIDISM, UNSPECIFIED TYPE: Primary | ICD-10-CM

## 2024-03-18 NOTE — TELEPHONE ENCOUNTER
Pt informed. She is questioning if she is on the correct dosage of thyroid meds. She said it seems as if those levels have decreased. Please advise.

## 2024-03-18 NOTE — TELEPHONE ENCOUNTER
Can Dalia be made aware that her labs show stable findings. Her ALT (liver enzyme) is just barely elevated (<56 is normal, and her value is 56). Please let me know if she has any questions

## 2024-03-19 ENCOUNTER — TELEPHONE (OUTPATIENT)
Dept: FAMILY MEDICINE CLINIC | Facility: CLINIC | Age: 59
End: 2024-03-19

## 2024-03-19 NOTE — TELEPHONE ENCOUNTER
----- Message from Henry Mays MD sent at 3/19/2024  6:45 AM EDT -----  Blood work came back normal

## 2024-04-02 ENCOUNTER — OFFICE VISIT (OUTPATIENT)
Dept: FAMILY MEDICINE CLINIC | Facility: CLINIC | Age: 59
End: 2024-04-02
Payer: COMMERCIAL

## 2024-04-02 VITALS
DIASTOLIC BLOOD PRESSURE: 72 MMHG | TEMPERATURE: 98 F | OXYGEN SATURATION: 97 % | HEIGHT: 64 IN | WEIGHT: 152 LBS | HEART RATE: 92 BPM | RESPIRATION RATE: 16 BRPM | BODY MASS INDEX: 25.95 KG/M2 | SYSTOLIC BLOOD PRESSURE: 132 MMHG

## 2024-04-02 DIAGNOSIS — F41.9 ANXIETY: ICD-10-CM

## 2024-04-02 DIAGNOSIS — J45.30 MILD PERSISTENT ASTHMA WITHOUT COMPLICATION: ICD-10-CM

## 2024-04-02 DIAGNOSIS — R74.8 ELEVATED LIVER ENZYMES: ICD-10-CM

## 2024-04-02 DIAGNOSIS — E78.49 OTHER HYPERLIPIDEMIA: ICD-10-CM

## 2024-04-02 DIAGNOSIS — E03.8 OTHER SPECIFIED HYPOTHYROIDISM: Primary | ICD-10-CM

## 2024-04-02 PROCEDURE — 99214 OFFICE O/P EST MOD 30 MIN: CPT | Performed by: FAMILY MEDICINE

## 2024-04-02 RX ORDER — SODIUM FLUORIDE 6 MG/ML
PASTE, DENTIFRICE DENTAL
COMMUNITY
Start: 2024-01-25

## 2024-04-02 RX ORDER — PHENTERMINE HYDROCHLORIDE 37.5 MG/1
37.5 TABLET ORAL DAILY
Qty: 30 TABLET | Refills: 0 | Status: SHIPPED | OUTPATIENT
Start: 2024-04-02

## 2024-04-02 NOTE — ASSESSMENT & PLAN NOTE
I am going to put her back on phentermine 37.5 mg half tablet daily.  Come back in 2 months.  Discussed about low-carb diet and regular exercise.

## 2024-04-02 NOTE — PROGRESS NOTES
Name: Dalia Loja      : 1965      MRN: [id number]  Encounter Provider: Henry Mays MD  Encounter Date: 2024   Encounter department: [facility]    Assessment & Plan     1. Other specified hypothyroidism  Assessment & Plan:  TSH came back therapeutic.  Continue levothyroxine 75 mcg daily.  Continue follow-up with endocrinologist.    Orders:  -     TSH, 3rd generation with Free T4 reflex; Future    2. BMI 25.0-25.9,adult  Assessment & Plan:  I am going to put her back on phentermine 37.5 mg half tablet daily.  Come back in 2 months.  Discussed about low-carb diet and regular exercise.    Orders:  -     phentermine (ADIPEX-P) 37.5 MG tablet; Take 1 tablet (37.5 mg total) by mouth in the morning    3. Mild persistent asthma without complication  Assessment & Plan:  Continue on inhaler.  Continue to follow-up with pulmonologist.      4. Other hyperlipidemia  Assessment & Plan:    Stable.  Continue on Crestor 10 mg daily.  We will continue to monitor fasting lipid profile.    Orders:  -     CBC and differential; Future  -     Lipid Panel with Direct LDL reflex; Future  -     Comprehensive metabolic panel; Future  -     TSH, 3rd generation with Free T4 reflex; Future    5. BMI 26.0-26.9,adult  Assessment & Plan:  I am going to put her back on phentermine 37.5 mg half tablet daily.  Come back in 2 months.  Discussed about low-carb diet and regular exercise.      6. Elevated liver enzymes  Assessment & Plan:  Improving.  Continue to monitor liver enzymes.      7. Anxiety  Assessment & Plan:  - patient with stable mood  - continue with Lexapro 10mg daily.            Subjective     Follow-up multiple medical problems.  She has been off the phentermine for the last couple months.  She gained 4 pounds since her last visit.  She has been trying to watch her diet and exercise.      Review of Systems   Constitutional:  Negative for chills and fever.   HENT:  Negative for trouble  swallowing.    Eyes:  Negative for visual disturbance.   Respiratory:  Negative for cough and shortness of breath.    Cardiovascular:  Negative for chest pain, palpitations and leg swelling.   Gastrointestinal:  Negative for abdominal pain, constipation and diarrhea.   Endocrine: Negative for cold intolerance and heat intolerance.   Genitourinary:  Negative for difficulty urinating and dysuria.   Musculoskeletal:  Negative for gait problem.   Skin:  Negative for rash.   Neurological:  Negative for dizziness, tremors, seizures and headaches.   Hematological:  Negative for adenopathy.   Psychiatric/Behavioral:  Negative for behavioral problems.        Past Medical History:   Diagnosis Date   • Anemia     Post gastric bypass surgery   • Asthma     Allergy induced   • Disease of thyroid gland      Past Surgical History:   Procedure Laterality Date   • CHOLECYSTECTOMY      When I had weight loss surgery   • HERNIA REPAIR      Belly Button   • REDUCTION MAMMAPLASTY Bilateral 1985     Family History   Problem Relation Age of Onset   • Cancer Mother    • Hypertension Father         Has a pacemaker as well.   • Asthma Sister    • No Known Problems Daughter    • No Known Problems Maternal Grandmother    • No Known Problems Paternal Grandmother    • No Known Problems Paternal Aunt      Social History     Socioeconomic History   • Marital status: /Civil Union     Spouse name: None   • Number of children: None   • Years of education: None   • Highest education level: None   Occupational History   • None   Tobacco Use   • Smoking status: Never   • Smokeless tobacco: Never   Vaping Use   • Vaping status: Never Used   Substance and Sexual Activity   • Alcohol use: Yes     Comment: occasional   • Drug use: Not Currently   • Sexual activity: Yes     Partners: Male     Birth control/protection: Post-menopausal   Other Topics Concern   • None   Social History Narrative   • None     Social Determinants of Health     Financial  Resource Strain: Not on file   Food Insecurity: Not on file   Transportation Needs: Not on file   Physical Activity: Not on file   Stress: Not on file   Social Connections: Not on file   Intimate Partner Violence: Not on file   Housing Stability: Not on file     Current Outpatient Medications on File Prior to Visit   Medication Sig   • albuterol (PROVENTIL HFA,VENTOLIN HFA) 90 mcg/act inhaler Inhale 2 puffs every 4 (four) hours as needed for wheezing   • escitalopram (LEXAPRO) 10 mg tablet Take 1 tablet (10 mg total) by mouth daily   • levothyroxine 75 mcg tablet TAKE ONE TABLET DAILY ON EMPTY STOMACH 1 HOUR BEFORE BREAKFAST AND 4 HOURS APART FROM CALCIUM AND VITAMIN D SUPPLEMENTATION   • PreviDent 5000 Booster Plus 1.1 % PSTE    • rosuvastatin (CRESTOR) 10 MG tablet take 1 tablet by mouth once daily   • Budeson-Glycopyrrol-Formoterol (Breztri Aerosphere) 160-9-4.8 MCG/ACT AERO Inhale 2 puffs daily Rinse mouth after use. (Patient taking differently: Inhale 2 puffs if needed Rinse mouth after use.)   • gabapentin (Neurontin) 100 mg capsule Take 1 capsule (100 mg total) by mouth 3 (three) times a day (Patient not taking: Reported on 11/9/2023)   • [DISCONTINUED] budesonide (Pulmicort Flexhaler) 90 MCG/ACT inhaler Inhale 1 puff 2 (two) times a day Rinse mouth after use. (Patient not taking: Reported on 10/10/2022)   • [DISCONTINUED] diclofenac (VOLTAREN) 75 mg EC tablet TAKE ONE TABLET (75 MG TOTAL) BY MOUTH TWO TIMES A DAY (Patient not taking: Reported on 3/14/2024)   • [DISCONTINUED] phentermine (ADIPEX-P) 37.5 MG tablet Take 1 tablet (37.5 mg total) by mouth in the morning (Patient not taking: Reported on 3/14/2024)     Allergies   Allergen Reactions   • Sulfa Antibiotics Hives and Rash   • Other Other (See Comments)     Seasonal allergies, Cats.        There is no immunization history on file for this patient.    Objective     /72 (BP Location: Left arm, Patient Position: Sitting, Cuff Size: Standard)    "Pulse 92   Temp 98 °F (36.7 °C) (Tympanic)   Resp 16   Ht 5' 4\" (1.626 m)   Wt 68.9 kg (152 lb)   SpO2 97%   BMI 26.09 kg/m²     Physical Exam  Vitals and nursing note reviewed.   Constitutional:       Appearance: She is well-developed.   HENT:      Head: Normocephalic and atraumatic.   Eyes:      Pupils: Pupils are equal, round, and reactive to light.   Cardiovascular:      Rate and Rhythm: Normal rate and regular rhythm.      Heart sounds: Normal heart sounds.   Pulmonary:      Effort: Pulmonary effort is normal.      Breath sounds: Normal breath sounds.   Abdominal:      General: Bowel sounds are normal.      Palpations: Abdomen is soft.   Musculoskeletal:      Cervical back: Normal range of motion and neck supple.   Lymphadenopathy:      Cervical: No cervical adenopathy.   Skin:     General: Skin is warm.   Neurological:      Mental Status: She is alert and oriented to person, place, and time.       Henry Mays MD    "

## 2024-04-02 NOTE — ASSESSMENT & PLAN NOTE
TSH came back therapeutic.  Continue levothyroxine 75 mcg daily.  Continue follow-up with endocrinologist.

## 2024-04-19 DIAGNOSIS — F41.9 ANXIETY: ICD-10-CM

## 2024-04-19 RX ORDER — ESCITALOPRAM OXALATE 10 MG/1
TABLET ORAL
Qty: 90 TABLET | Refills: 1 | Status: SHIPPED | OUTPATIENT
Start: 2024-04-19

## 2024-05-09 ENCOUNTER — OFFICE VISIT (OUTPATIENT)
Dept: RHEUMATOLOGY | Facility: CLINIC | Age: 59
End: 2024-05-09
Payer: COMMERCIAL

## 2024-05-09 VITALS
BODY MASS INDEX: 26.12 KG/M2 | SYSTOLIC BLOOD PRESSURE: 124 MMHG | HEART RATE: 73 BPM | WEIGHT: 153 LBS | OXYGEN SATURATION: 99 % | HEIGHT: 64 IN | DIASTOLIC BLOOD PRESSURE: 80 MMHG

## 2024-05-09 DIAGNOSIS — M19.042 PRIMARY OSTEOARTHRITIS OF BOTH HANDS: ICD-10-CM

## 2024-05-09 DIAGNOSIS — M19.041 PRIMARY OSTEOARTHRITIS OF BOTH HANDS: ICD-10-CM

## 2024-05-09 DIAGNOSIS — M50.30 DDD (DEGENERATIVE DISC DISEASE), CERVICAL: ICD-10-CM

## 2024-05-09 DIAGNOSIS — M19.90 OSTEOARTHRITIS, UNSPECIFIED OSTEOARTHRITIS TYPE, UNSPECIFIED SITE: Primary | ICD-10-CM

## 2024-05-09 DIAGNOSIS — G56.03 BILATERAL CARPAL TUNNEL SYNDROME: ICD-10-CM

## 2024-05-09 PROCEDURE — 99214 OFFICE O/P EST MOD 30 MIN: CPT | Performed by: INTERNAL MEDICINE
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
## 2024-05-13 DIAGNOSIS — E03.9 HYPOTHYROIDISM, UNSPECIFIED TYPE: ICD-10-CM

## 2024-05-14 RX ORDER — LEVOTHYROXINE SODIUM 0.07 MG/1
TABLET ORAL
Qty: 30 TABLET | Refills: 5 | Status: SHIPPED | OUTPATIENT
Start: 2024-05-14

## 2024-06-03 ENCOUNTER — OFFICE VISIT (OUTPATIENT)
Dept: URGENT CARE | Facility: CLINIC | Age: 59
End: 2024-06-03
Payer: COMMERCIAL

## 2024-06-03 ENCOUNTER — APPOINTMENT (OUTPATIENT)
Dept: RADIOLOGY | Facility: CLINIC | Age: 59
End: 2024-06-03
Payer: COMMERCIAL

## 2024-06-03 VITALS
HEART RATE: 80 BPM | OXYGEN SATURATION: 98 % | BODY MASS INDEX: 26.12 KG/M2 | RESPIRATION RATE: 18 BRPM | DIASTOLIC BLOOD PRESSURE: 84 MMHG | WEIGHT: 153 LBS | SYSTOLIC BLOOD PRESSURE: 148 MMHG | HEIGHT: 64 IN

## 2024-06-03 DIAGNOSIS — R07.81 RIB PAIN ON RIGHT SIDE: ICD-10-CM

## 2024-06-03 DIAGNOSIS — R07.81 RIB PAIN ON RIGHT SIDE: Primary | ICD-10-CM

## 2024-06-03 PROCEDURE — 71101 X-RAY EXAM UNILAT RIBS/CHEST: CPT

## 2024-06-03 PROCEDURE — 99213 OFFICE O/P EST LOW 20 MIN: CPT | Performed by: PHYSICIAN ASSISTANT
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
## 2024-06-04 ENCOUNTER — OFFICE VISIT (OUTPATIENT)
Dept: FAMILY MEDICINE CLINIC | Facility: CLINIC | Age: 59
End: 2024-06-04
Payer: COMMERCIAL

## 2024-06-04 VITALS
SYSTOLIC BLOOD PRESSURE: 118 MMHG | TEMPERATURE: 97.8 F | DIASTOLIC BLOOD PRESSURE: 70 MMHG | RESPIRATION RATE: 16 BRPM | OXYGEN SATURATION: 97 % | BODY MASS INDEX: 25.99 KG/M2 | HEART RATE: 68 BPM | WEIGHT: 152.2 LBS | HEIGHT: 64 IN

## 2024-06-04 DIAGNOSIS — E78.49 OTHER HYPERLIPIDEMIA: ICD-10-CM

## 2024-06-04 DIAGNOSIS — J45.30 MILD PERSISTENT ASTHMA WITHOUT COMPLICATION: ICD-10-CM

## 2024-06-04 DIAGNOSIS — E03.8 OTHER SPECIFIED HYPOTHYROIDISM: ICD-10-CM

## 2024-06-04 DIAGNOSIS — F41.9 ANXIETY: ICD-10-CM

## 2024-06-04 LAB
SL AMB THYROGLOBULIN AUTOAB: 10 IU/ML
T4 FREE SERPL-MCNC: 1.08 NG/DL (ref 0.61–1.12)
THYROPEROXIDASE AB SERPL-ACNC: 1 IU/ML
TSH SERPL-ACNC: 2.04 UIU/ML (ref 0.45–5.33)

## 2024-06-04 PROCEDURE — 99214 OFFICE O/P EST MOD 30 MIN: CPT | Performed by: FAMILY MEDICINE
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
## 2024-06-04 NOTE — PROGRESS NOTES
Ambulatory Visit  Name: Dalia Loja      : 1965      MRN: [id number]  Encounter Provider: Henry Mays MD  Encounter Date: 2024   Encounter department: [facility]    Assessment & Plan   1. BMI 26.0-26.9,adult  Assessment & Plan:  Continue phentermine 37.5mg half tablet daily.  She was also given samples of Rybelsus.  Follow up in 1 month. Continue low-carb diet and regular exercise.  2. Anxiety  Assessment & Plan:  Stable. Continue with lexapro 10mg daily.  3. Other specified hypothyroidism  Assessment & Plan:  Continue levothyroxine 75mcg daily. Continue follow-up with endocrinologist.  Orders:  -     TSH, 3rd generation with Free T4 reflex; Future  4. Mild persistent asthma without complication  Assessment & Plan:  Continue inhalers. Continue follow-up with pulmonologist.  5. Other hyperlipidemia  Assessment & Plan:  Continue Crestor 10mg daily. Continue to monitor with lipid panel.  Orders:  -     CBC and differential; Future  -     Comprehensive metabolic panel; Future  -     Lipid Panel with Direct LDL reflex; Future  -     TSH, 3rd generation with Free T4 reflex; Future         History of Present Illness     59 y/o female with follow up of multiple medical problems. She is currently on phentermine 37.5mg daily without any side effects. She is taking her other medications regularly with no reported side effects as well. She has not lost any weight since last visit. Patient reports that she did have an injury 2 weeks ago while on her mower she was hit by a PVC pipe in her right rib area. She saw urgent care yesterday who did a rib x-ray and found no fracture. She is having some pain with breathing and coughing. She completed her bloodwork this morning but her results are not in yet.       Review of Systems   Constitutional:  Negative for chills and fever.   HENT:  Negative for ear pain and sore throat.    Eyes:  Negative for pain and visual disturbance.    Respiratory:  Negative for cough and shortness of breath.    Cardiovascular:  Negative for chest pain and palpitations.   Gastrointestinal:  Negative for abdominal pain and vomiting.   Genitourinary:  Negative for dysuria and hematuria.   Musculoskeletal:  Negative for arthralgias and back pain.        + right rib pain   Skin:  Negative for color change and rash.   Neurological:  Negative for seizures and syncope.   All other systems reviewed and are negative.    Past Medical History:   Diagnosis Date   • Anemia     Post gastric bypass surgery   • Asthma     Allergy induced   • Disease of thyroid gland      Past Surgical History:   Procedure Laterality Date   • CHOLECYSTECTOMY      When I had weight loss surgery   • HERNIA REPAIR      Belly Button   • REDUCTION MAMMAPLASTY Bilateral 1985     Family History   Problem Relation Age of Onset   • Cancer Mother    • Hypertension Father         Has a pacemaker as well.   • Asthma Sister    • No Known Problems Daughter    • No Known Problems Maternal Grandmother    • No Known Problems Paternal Grandmother    • No Known Problems Paternal Aunt      Social History     Tobacco Use   • Smoking status: Never   • Smokeless tobacco: Never   Vaping Use   • Vaping status: Never Used   Substance and Sexual Activity   • Alcohol use: Yes     Comment: occasional   • Drug use: Not Currently   • Sexual activity: Yes     Partners: Male     Birth control/protection: Post-menopausal     Current Outpatient Medications on File Prior to Visit   Medication Sig   • albuterol (PROVENTIL HFA,VENTOLIN HFA) 90 mcg/act inhaler Inhale 2 puffs every 4 (four) hours as needed for wheezing   • escitalopram (LEXAPRO) 10 mg tablet TAKE ONE TABLET (10 MG TOTAL) BY MOUTH DAILY   • levothyroxine 75 mcg tablet Take one tablet daily on empty stomach 1 hour before breakfast and 4 hours apart from calcium and vitamin D supplementation   • phentermine (ADIPEX-P) 37.5 MG tablet Take 1 tablet (37.5 mg total) by mouth  "in the morning   • PreviDent 5000 Booster Plus 1.1 % PSTE    • rosuvastatin (CRESTOR) 10 MG tablet take 1 tablet by mouth once daily   • Budeson-Glycopyrrol-Formoterol (Breztri Aerosphere) 160-9-4.8 MCG/ACT AERO Inhale 2 puffs daily Rinse mouth after use. (Patient not taking: Reported on 6/3/2024)   • gabapentin (Neurontin) 100 mg capsule Take 1 capsule (100 mg total) by mouth 3 (three) times a day (Patient not taking: Reported on 11/9/2023)   • [DISCONTINUED] budesonide (Pulmicort Flexhaler) 90 MCG/ACT inhaler Inhale 1 puff 2 (two) times a day Rinse mouth after use. (Patient not taking: Reported on 10/10/2022)     Allergies   Allergen Reactions   • Sulfa Antibiotics Hives and Rash   • Other Other (See Comments)     Seasonal allergies, Cats.        There is no immunization history on file for this patient.  Objective     /70 (BP Location: Left arm, Patient Position: Sitting, Cuff Size: Standard)   Pulse 68   Temp 97.8 °F (36.6 °C) (Tympanic)   Resp 16   Ht 5' 4\" (1.626 m)   Wt 69 kg (152 lb 3.2 oz)   SpO2 97%   BMI 26.13 kg/m²     Physical Exam  Vitals and nursing note reviewed.   Constitutional:       General: She is not in acute distress.     Appearance: She is well-developed.   HENT:      Head: Normocephalic and atraumatic.   Eyes:      Conjunctiva/sclera: Conjunctivae normal.   Cardiovascular:      Rate and Rhythm: Normal rate and regular rhythm.      Heart sounds: No murmur heard.  Pulmonary:      Effort: Pulmonary effort is normal. No respiratory distress.      Breath sounds: Normal breath sounds.   Abdominal:      Palpations: Abdomen is soft.      Tenderness: There is no abdominal tenderness.   Musculoskeletal:         General: No swelling.      Cervical back: Neck supple.   Skin:     General: Skin is warm and dry.      Capillary Refill: Capillary refill takes less than 2 seconds.   Neurological:      Mental Status: She is alert.   Psychiatric:         Mood and Affect: Mood normal. "       Administrative Statements

## 2024-06-04 NOTE — ASSESSMENT & PLAN NOTE
Continue phentermine 37.5mg half tablet daily.  She was also given samples of Rybelsus.  Follow up in 1 month. Continue low-carb diet and regular exercise.

## 2024-06-05 LAB — CCP IGA+IGG SERPL IA-ACNC: <3 RU/ML

## 2024-06-12 ENCOUNTER — HOSPITAL ENCOUNTER (EMERGENCY)
Facility: HOSPITAL | Age: 59
Discharge: HOME | End: 2024-06-12
Attending: EMERGENCY MEDICINE
Payer: COMMERCIAL

## 2024-06-12 ENCOUNTER — APPOINTMENT (EMERGENCY)
Dept: RADIOLOGY | Facility: HOSPITAL | Age: 59
End: 2024-06-12
Payer: COMMERCIAL

## 2024-06-12 VITALS
OXYGEN SATURATION: 97 % | BODY MASS INDEX: 25.61 KG/M2 | DIASTOLIC BLOOD PRESSURE: 56 MMHG | WEIGHT: 150 LBS | TEMPERATURE: 100.2 F | HEART RATE: 97 BPM | SYSTOLIC BLOOD PRESSURE: 108 MMHG | RESPIRATION RATE: 18 BRPM | HEIGHT: 64 IN

## 2024-06-12 DIAGNOSIS — A69.20 LYME DISEASE: ICD-10-CM

## 2024-06-12 DIAGNOSIS — B34.9 VIRAL SYNDROME: Primary | ICD-10-CM

## 2024-06-12 LAB
ALBUMIN SERPL BCP-MCNC: 3.9 G/DL (ref 3.5–5)
ALP SERPL-CCNC: 100 U/L (ref 34–104)
ALT SERPL W P-5'-P-CCNC: 55 U/L (ref 7–52)
ANION GAP SERPL CALCULATED.3IONS-SCNC: 8 MMOL/L (ref 4–13)
APTT PPP: 34 SECONDS (ref 23–37)
AST SERPL W P-5'-P-CCNC: 36 U/L (ref 13–39)
BACTERIA UR QL AUTO: ABNORMAL /HPF
BASOPHILS # BLD MANUAL: 0.16 THOUSAND/UL (ref 0–0.1)
BASOPHILS NFR MAR MANUAL: 2 % (ref 0–1)
BILIRUB SERPL-MCNC: 0.79 MG/DL (ref 0.2–1)
BILIRUB UR QL STRIP: NEGATIVE
BUN SERPL-MCNC: 10 MG/DL (ref 5–25)
CALCIUM SERPL-MCNC: 8.8 MG/DL (ref 8.4–10.2)
CARDIAC TROPONIN I PNL SERPL HS: 3 NG/L
CHLORIDE SERPL-SCNC: 101 MMOL/L (ref 96–108)
CLARITY UR: CLEAR
CO2 SERPL-SCNC: 24 MMOL/L (ref 21–32)
COLOR UR: YELLOW
CREAT SERPL-MCNC: 0.77 MG/DL (ref 0.6–1.3)
EOSINOPHIL # BLD MANUAL: 0 THOUSAND/UL (ref 0–0.4)
EOSINOPHIL NFR BLD MANUAL: 0 % (ref 0–6)
ERYTHROCYTE [DISTWIDTH] IN BLOOD BY AUTOMATED COUNT: 13.6 % (ref 11.6–15.1)
FLUAV RNA RESP QL NAA+PROBE: NEGATIVE
FLUBV RNA RESP QL NAA+PROBE: NEGATIVE
GFR SERPL CREATININE-BSD FRML MDRD: 85 ML/MIN/1.73SQ M
GLUCOSE SERPL-MCNC: 130 MG/DL (ref 65–140)
GLUCOSE UR STRIP-MCNC: NEGATIVE MG/DL
HCT VFR BLD AUTO: 38.3 % (ref 34.8–46.1)
HGB BLD-MCNC: 12.4 G/DL (ref 11.5–15.4)
HGB UR QL STRIP.AUTO: ABNORMAL
INR PPP: 1.03 (ref 0.84–1.19)
KETONES UR STRIP-MCNC: ABNORMAL MG/DL
LACTATE SERPL-SCNC: 1.1 MMOL/L (ref 0.5–2)
LEUKOCYTE ESTERASE UR QL STRIP: NEGATIVE
LYMPHOCYTES # BLD AUTO: 2.11 THOUSAND/UL (ref 0.6–4.47)
LYMPHOCYTES # BLD AUTO: 24 % (ref 14–44)
MCH RBC QN AUTO: 30.8 PG (ref 26.8–34.3)
MCHC RBC AUTO-ENTMCNC: 32.4 G/DL (ref 31.4–37.4)
MCV RBC AUTO: 95 FL (ref 82–98)
MONOCYTES # BLD AUTO: 0.49 THOUSAND/UL (ref 0–1.22)
MONOCYTES NFR BLD: 6 % (ref 4–12)
MUCOUS THREADS UR QL AUTO: ABNORMAL
NEUTROPHILS # BLD MANUAL: 5.36 THOUSAND/UL (ref 1.85–7.62)
NEUTS BAND NFR BLD MANUAL: 2 % (ref 0–8)
NEUTS SEG NFR BLD AUTO: 64 % (ref 43–75)
NITRITE UR QL STRIP: NEGATIVE
NON-SQ EPI CELLS URNS QL MICRO: ABNORMAL /HPF
PH UR STRIP.AUTO: 8 [PH]
PLATELET # BLD AUTO: 150 THOUSANDS/UL (ref 149–390)
PLATELET BLD QL SMEAR: ADEQUATE
PMV BLD AUTO: 9.1 FL (ref 8.9–12.7)
POLYCHROMASIA BLD QL SMEAR: PRESENT
POTASSIUM SERPL-SCNC: 4.3 MMOL/L (ref 3.5–5.3)
PROCALCITONIN SERPL-MCNC: 0.71 NG/ML
PROT SERPL-MCNC: 7.1 G/DL (ref 6.4–8.4)
PROT UR STRIP-MCNC: ABNORMAL MG/DL
PROTHROMBIN TIME: 13.7 SECONDS (ref 11.6–14.5)
RBC # BLD AUTO: 4.03 MILLION/UL (ref 3.81–5.12)
RBC #/AREA URNS AUTO: ABNORMAL /HPF
RBC MORPH BLD: PRESENT
RSV RNA RESP QL NAA+PROBE: NEGATIVE
SARS-COV-2 RNA RESP QL NAA+PROBE: NEGATIVE
SODIUM SERPL-SCNC: 133 MMOL/L (ref 135–147)
SP GR UR STRIP.AUTO: 1.02
TSH SERPL DL<=0.05 MIU/L-ACNC: 0.47 UIU/ML (ref 0.45–4.5)
UROBILINOGEN UR QL STRIP.AUTO: 4 E.U./DL
VARIANT LYMPHS # BLD AUTO: 2 %
WBC # BLD AUTO: 8.12 THOUSAND/UL (ref 4.31–10.16)
WBC #/AREA URNS AUTO: ABNORMAL /HPF

## 2024-06-12 PROCEDURE — 93005 ELECTROCARDIOGRAM TRACING: CPT

## 2024-06-12 PROCEDURE — 83605 ASSAY OF LACTIC ACID: CPT | Performed by: EMERGENCY MEDICINE

## 2024-06-12 PROCEDURE — 85027 COMPLETE CBC AUTOMATED: CPT | Performed by: EMERGENCY MEDICINE

## 2024-06-12 PROCEDURE — 85610 PROTHROMBIN TIME: CPT | Performed by: EMERGENCY MEDICINE

## 2024-06-12 PROCEDURE — 85730 THROMBOPLASTIN TIME PARTIAL: CPT | Performed by: EMERGENCY MEDICINE

## 2024-06-12 PROCEDURE — 99284 EMERGENCY DEPT VISIT MOD MDM: CPT

## 2024-06-12 PROCEDURE — 36415 COLL VENOUS BLD VENIPUNCTURE: CPT | Performed by: EMERGENCY MEDICINE

## 2024-06-12 PROCEDURE — 84484 ASSAY OF TROPONIN QUANT: CPT | Performed by: EMERGENCY MEDICINE

## 2024-06-12 PROCEDURE — 86618 LYME DISEASE ANTIBODY: CPT | Performed by: EMERGENCY MEDICINE

## 2024-06-12 PROCEDURE — 71045 X-RAY EXAM CHEST 1 VIEW: CPT

## 2024-06-12 PROCEDURE — 85007 BL SMEAR W/DIFF WBC COUNT: CPT | Performed by: EMERGENCY MEDICINE

## 2024-06-12 PROCEDURE — 87040 BLOOD CULTURE FOR BACTERIA: CPT | Performed by: EMERGENCY MEDICINE

## 2024-06-12 PROCEDURE — 96361 HYDRATE IV INFUSION ADD-ON: CPT

## 2024-06-12 PROCEDURE — 86308 HETEROPHILE ANTIBODY SCREEN: CPT | Performed by: EMERGENCY MEDICINE

## 2024-06-12 PROCEDURE — 0241U HB NFCT DS VIR RESP RNA 4 TRGT: CPT | Performed by: EMERGENCY MEDICINE

## 2024-06-12 PROCEDURE — 80053 COMPREHEN METABOLIC PANEL: CPT | Performed by: EMERGENCY MEDICINE

## 2024-06-12 PROCEDURE — 96374 THER/PROPH/DIAG INJ IV PUSH: CPT

## 2024-06-12 PROCEDURE — 84145 PROCALCITONIN (PCT): CPT | Performed by: EMERGENCY MEDICINE

## 2024-06-12 PROCEDURE — 96376 TX/PRO/DX INJ SAME DRUG ADON: CPT

## 2024-06-12 PROCEDURE — 81001 URINALYSIS AUTO W/SCOPE: CPT | Performed by: EMERGENCY MEDICINE

## 2024-06-12 PROCEDURE — 84443 ASSAY THYROID STIM HORMONE: CPT | Performed by: EMERGENCY MEDICINE

## 2024-06-12 PROCEDURE — 99285 EMERGENCY DEPT VISIT HI MDM: CPT | Performed by: EMERGENCY MEDICINE

## 2024-06-12 RX ORDER — DOXYCYCLINE HYCLATE 100 MG/1
100 CAPSULE ORAL 2 TIMES DAILY
Qty: 28 CAPSULE | Refills: 0 | Status: SHIPPED | OUTPATIENT
Start: 2024-06-12 | End: 2024-06-26

## 2024-06-12 RX ORDER — KETOROLAC TROMETHAMINE 30 MG/ML
15 INJECTION, SOLUTION INTRAMUSCULAR; INTRAVENOUS ONCE
Status: COMPLETED | OUTPATIENT
Start: 2024-06-12 | End: 2024-06-12

## 2024-06-12 RX ORDER — ACETAMINOPHEN 325 MG/1
975 TABLET ORAL ONCE
Status: COMPLETED | OUTPATIENT
Start: 2024-06-12 | End: 2024-06-12

## 2024-06-12 RX ORDER — DOXYCYCLINE HYCLATE 100 MG/1
100 CAPSULE ORAL ONCE
Status: COMPLETED | OUTPATIENT
Start: 2024-06-12 | End: 2024-06-12

## 2024-06-12 RX ADMIN — SODIUM CHLORIDE 1000 ML: 0.9 INJECTION, SOLUTION INTRAVENOUS at 18:35

## 2024-06-12 RX ADMIN — DOXYCYCLINE HYCLATE 100 MG: 100 CAPSULE ORAL at 20:20

## 2024-06-12 RX ADMIN — SODIUM CHLORIDE 1000 ML: 0.9 INJECTION, SOLUTION INTRAVENOUS at 19:45

## 2024-06-12 RX ADMIN — KETOROLAC TROMETHAMINE 15 MG: 30 INJECTION, SOLUTION INTRAMUSCULAR at 20:21

## 2024-06-12 RX ADMIN — KETOROLAC TROMETHAMINE 15 MG: 30 INJECTION, SOLUTION INTRAMUSCULAR at 18:48

## 2024-06-12 RX ADMIN — ACETAMINOPHEN 975 MG: 325 TABLET ORAL at 18:48

## 2024-06-12 NOTE — ED PROVIDER NOTES
History  Chief Complaint   Patient presents with    Fatigue     Patient comes in with fatigue, fever and chills that started two weeks ago.She started feeling better but in the last two days she has started to feel weak again with chills and fever.      10 days ago had flu-like illness. Improved for about a week then started again 2 days ago.      Fatigue      Prior to Admission Medications   Prescriptions Last Dose Informant Patient Reported? Taking?   Budeson-Glycopyrrol-Formoterol (Breztri Aerosphere) 160-9-4.8 MCG/ACT AERO   No No   Sig: Inhale 2 puffs daily Rinse mouth after use.   Patient not taking: Reported on 6/3/2024   PreviDent 5000 Booster Plus 1.1 % PSTE   Yes No   albuterol (PROVENTIL HFA,VENTOLIN HFA) 90 mcg/act inhaler   No No   Sig: Inhale 2 puffs every 4 (four) hours as needed for wheezing   escitalopram (LEXAPRO) 10 mg tablet   No No   Sig: TAKE ONE TABLET (10 MG TOTAL) BY MOUTH DAILY   gabapentin (Neurontin) 100 mg capsule   No No   Sig: Take 1 capsule (100 mg total) by mouth 3 (three) times a day   Patient not taking: Reported on 11/9/2023   levothyroxine 75 mcg tablet   No No   Sig: Take one tablet daily on empty stomach 1 hour before breakfast and 4 hours apart from calcium and vitamin D supplementation   phentermine (ADIPEX-P) 37.5 MG tablet   No No   Sig: Take 1 tablet (37.5 mg total) by mouth in the morning   rosuvastatin (CRESTOR) 10 MG tablet   No No   Sig: take 1 tablet by mouth once daily      Facility-Administered Medications: None       Past Medical History:   Diagnosis Date    Anemia     Post gastric bypass surgery    Asthma     Allergy induced    Disease of thyroid gland        Past Surgical History:   Procedure Laterality Date    CHOLECYSTECTOMY      When I had weight loss surgery    HERNIA REPAIR      Belly Button    REDUCTION MAMMAPLASTY Bilateral 1985       Family History   Problem Relation Age of Onset    Cancer Mother     Hypertension Father         Has a pacemaker as well.     Asthma Sister     No Known Problems Daughter     No Known Problems Maternal Grandmother     No Known Problems Paternal Grandmother     No Known Problems Paternal Aunt      I have reviewed and agree with the history as documented.    E-Cigarette/Vaping    E-Cigarette Use Never User      E-Cigarette/Vaping Substances    Nicotine No     THC No     CBD No     Flavoring No     Other No     Unknown No      Social History     Tobacco Use    Smoking status: Never    Smokeless tobacco: Never   Vaping Use    Vaping status: Never Used   Substance Use Topics    Alcohol use: Yes     Comment: occasional    Drug use: Not Currently       Review of Systems   Constitutional:  Positive for fatigue.       Physical Exam  Physical Exam  Vitals and nursing note reviewed.   Constitutional:       Appearance: Normal appearance. She is well-developed.   HENT:      Head: Normocephalic and atraumatic.   Eyes:      Conjunctiva/sclera: Conjunctivae normal.      Pupils: Pupils are equal, round, and reactive to light.   Neck:      Trachea: No tracheal deviation.   Cardiovascular:      Rate and Rhythm: Regular rhythm. Tachycardia present.      Heart sounds: Normal heart sounds. No murmur heard.  Pulmonary:      Effort: Pulmonary effort is normal. No respiratory distress.      Breath sounds: Normal breath sounds. No wheezing or rales.   Abdominal:      General: Bowel sounds are normal. There is no distension.      Palpations: Abdomen is soft.      Tenderness: There is no abdominal tenderness.   Musculoskeletal:         General: No deformity.      Cervical back: Normal range of motion and neck supple.   Skin:     General: Skin is warm and dry.      Capillary Refill: Capillary refill takes less than 2 seconds.   Neurological:      General: No focal deficit present.      Mental Status: She is alert and oriented to person, place, and time.      Sensory: No sensory deficit.   Psychiatric:         Mood and Affect: Mood normal.         Judgment: Judgment  normal.         Vital Signs  ED Triage Vitals   Temperature Pulse Respirations Blood Pressure SpO2   06/12/24 1830 06/12/24 1830 06/12/24 1830 06/12/24 1830 06/12/24 1830   (!) 102.1 °F (38.9 °C) (!) 125 18 139/79 98 %      Temp Source Heart Rate Source Patient Position - Orthostatic VS BP Location FiO2 (%)   06/12/24 1830 06/12/24 1830 06/12/24 1845 06/12/24 1845 --   Temporal Monitor Sitting Right arm       Pain Score       06/12/24 1830       No Pain           Vitals:    06/12/24 1930 06/12/24 2000 06/12/24 2005 06/12/24 2040   BP: 117/57 108/56     Pulse: (!) 109 99 98 97   Patient Position - Orthostatic VS: Sitting Sitting  Sitting         Visual Acuity      ED Medications  Medications   sodium chloride 0.9 % bolus 1,000 mL (0 mL Intravenous Stopped 6/12/24 1938)   ketorolac (TORADOL) injection 15 mg (15 mg Intravenous Given 6/12/24 1848)   acetaminophen (TYLENOL) tablet 975 mg (975 mg Oral Given 6/12/24 1848)   sodium chloride 0.9 % bolus 1,000 mL (0 mL Intravenous Stopped 6/12/24 2054)   doxycycline hyclate (VIBRAMYCIN) capsule 100 mg (100 mg Oral Given 6/12/24 2020)   ketorolac (TORADOL) injection 15 mg (15 mg Intravenous Given 6/12/24 2021)       Diagnostic Studies  Results Reviewed       Procedure Component Value Units Date/Time    HS Troponin 0hr (reflex protocol) [[id number]]  (Normal) Collected: 06/12/24 1945    Lab Status: Final result Specimen: Blood from Arm, Right Updated: 06/12/24 2022     hs TnI 0hr 3 ng/L     TSH, 3rd generation with Free T4 reflex [[id number]]  (Normal) Collected: 06/12/24 1834    Lab Status: Final result Specimen: Blood from Arm, Right Updated: 06/12/24 2006     TSH 3RD GENERATON 0.472 uIU/mL     RBC Morphology Reflex Test [[id number]] Collected: 06/12/24 1834    Lab Status: Final result Specimen: Blood from Arm, Right Updated: 06/12/24 2001    Mononucleosis screen [[id number]] Collected: 06/12/24 1930    Lab Status: In process Specimen: Blood from Arm, Right Updated: 06/12/24 1934     Lyme Total AB W Reflex to IGM/IGG [[id number]] Collected: 06/12/24 1930    Lab Status: In process Specimen: Blood from Arm, Right Updated: 06/12/24 1934    Narrative:      The following orders were created for panel order Lyme Total AB W Reflex to IGM/IGG.  Procedure                               Abnormality         Status                     ---------                               -----------         ------                     Lyme Total AB W Reflex t...[[id number]]                      In process                   Please view results for these tests on the individual orders.    Lyme Total AB W Reflex to IGM/IGG [[id number]] Collected: 06/12/24 1930    Lab Status: In process Specimen: Blood from Arm, Right Updated: 06/12/24 1934    Procalcitonin [[id number]]  (Abnormal) Collected: 06/12/24 1834    Lab Status: Final result Specimen: Blood from Arm, Right Updated: 06/12/24 1933     Procalcitonin 0.71 ng/ml     FLU/RSV/COVID - if FLU/RSV clinically relevant [[id number]]  (Normal) Collected: 06/12/24 1834    Lab Status: Final result Specimen: Nares from Nose Updated: 06/12/24 1919     SARS-CoV-2 Negative     INFLUENZA A PCR Negative     INFLUENZA B PCR Negative     RSV PCR Negative    Narrative:      FOR PEDIATRIC PATIENTS - copy/paste COVID Guidelines URL to browser: [email or web address]    SARS-CoV-2 assay is a Nucleic Acid Amplification assay intended for the  qualitative detection of nucleic acid from SARS-CoV-2 in nasopharyngeal  swabs. Results are for the presumptive identification of SARS-CoV-2 RNA.    Positive results are indicative of infection with SARS-CoV-2, the virus  causing COVID-19, but do not rule out bacterial infection or co-infection  with other viruses. Laboratories within the [place] and its  territories are required to report all positive results to the appropriate  public health authorities. Negative results do not preclude  SARS-CoV-2  infection and should not be used as the sole basis for treatment or other  patient management decisions. Negative results must be combined with  clinical observations, patient history, and epidemiological information.  This test has not been FDA cleared or approved.    This test has been authorized by FDA under an Emergency Use Authorization  (EUA). This test is only authorized for the duration of time the  declaration that circumstances exist justifying the authorization of the  emergency use of an in vitro diagnostic tests for detection of SARS-CoV-2  virus and/or diagnosis of COVID-19 infection under section 564(b)(1) of  the Act, 21 U.S.C. 360bbb-3(b)(1), unless the authorization is terminated  or revoked sooner. The test has been validated but independent review by FDA  and CLIA is pending.    Test performed using [employer] GeneXpert: This RT-PCR assay targets N2,  a region unique to SARS-CoV-2. A conserved region in the E-gene was chosen  for pan-Sarbecovirus detection which includes SARS-CoV-2.    According to CMS-2020-01-R, this platform meets the definition of high-throughput technology.    CBC and differential [[id number]]  (Normal) Collected: 06/12/24 1834    Lab Status: Final result Specimen: Blood from Arm, Right Updated: 06/12/24 1916     WBC 8.12 Thousand/uL      RBC 4.03 Million/uL      Hemoglobin 12.4 g/dL      Hematocrit 38.3 %      MCV 95 fL      MCH 30.8 pg      MCHC 32.4 g/dL      RDW 13.6 %      MPV 9.1 fL      Platelets 150 Thousands/uL     Narrative:      This is an appended report.  These results have been appended to a previously verified report.    Manual Differential(PHLEBS Do Not Order) [[id number]]  (Abnormal) Collected: 06/12/24 1834    Lab Status: Final result Specimen: Blood from Arm, Right Updated: 06/12/24 1916     Segmented % 64 %      Bands % 2 %      Lymphocytes % 24 %      Monocytes % 6 %      Eosinophils % 0 %      Basophils % 2 %      Atypical Lymphocytes % 2 %       Absolute Neutrophils 5.36 Thousand/uL      Absolute Lymphocytes 2.11 Thousand/uL      Absolute Monocytes 0.49 Thousand/uL      Absolute Eosinophils 0.00 Thousand/uL      Absolute Basophils 0.16 Thousand/uL      Total Counted --     RBC Morphology Present     Platelet Estimate Adequate     Polychromasia Present    Urine Microscopic [[id number]]  (Abnormal) Collected: 06/12/24 1848    Lab Status: Final result Specimen: Urine, Clean Catch Updated: 06/12/24 1912     RBC, UA 4-10 /hpf      WBC, UA None Seen /hpf      Epithelial Cells Occasional /hpf      Bacteria, UA None Seen /hpf      MUCUS THREADS Occasional    Comprehensive metabolic panel [[id number]]  (Abnormal) Collected: 06/12/24 1834    Lab Status: Final result Specimen: Blood from Arm, Right Updated: 06/12/24 1911     Sodium 133 mmol/L      Potassium 4.3 mmol/L      Chloride 101 mmol/L      CO2 24 mmol/L      ANION GAP 8 mmol/L      BUN 10 mg/dL      Creatinine 0.77 mg/dL      Glucose 130 mg/dL      Calcium 8.8 mg/dL      AST 36 U/L      ALT 55 U/L      Alkaline Phosphatase 100 U/L      Total Protein 7.1 g/dL      Albumin 3.9 g/dL      Total Bilirubin 0.79 mg/dL      eGFR 85 ml/min/1.73sq m     Narrative:      National Kidney Disease Foundation guidelines for Chronic Kidney Disease (CKD):     Stage 1 with normal or high GFR (GFR > 90 mL/min/1.73 square meters)    Stage 2 Mild CKD (GFR = 60-89 mL/min/1.73 square meters)    Stage 3A Moderate CKD (GFR = 45-59 mL/min/1.73 square meters)    Stage 3B Moderate CKD (GFR = 30-44 mL/min/1.73 square meters)    Stage 4 Severe CKD (GFR = 15-29 mL/min/1.73 square meters)    Stage 5 End Stage CKD (GFR <15 mL/min/1.73 square meters)  Note: GFR calculation is accurate only with a steady state creatinine    UA w Reflex to Microscopic w Reflex to Culture [[id number]]  (Abnormal) Collected: 06/12/24 1848    Lab Status: Final result Specimen: Urine, Clean Catch Updated: 06/12/24 1903     Color, UA Yellow     Clarity, UA Clear      Specific Gravity, UA 1.025     pH, UA 8.0     Leukocytes, UA Negative     Nitrite, UA Negative     Protein, UA Trace mg/dl      Glucose, UA Negative mg/dl      Ketones, UA 15 (1+) mg/dl      Urobilinogen, UA 4.0 E.U./dl      Bilirubin, UA Negative     Occult Blood, UA 1+    Protime-INR [[id number]]  (Normal) Collected: 06/12/24 1834    Lab Status: Final result Specimen: Blood from Arm, Right Updated: 06/12/24 1856     Protime 13.7 seconds      INR 1.03    APTT [[id number]]  (Normal) Collected: 06/12/24 1834    Lab Status: Final result Specimen: Blood from Arm, Right Updated: 06/12/24 1856     PTT 34 seconds     Lactic acid [[id number]]  (Normal) Collected: 06/12/24 1834    Lab Status: Final result Specimen: Blood from Arm, Right Updated: 06/12/24 1855     LACTIC ACID 1.1 mmol/L     Narrative:      Result may be elevated if tourniquet was used during collection.    Blood culture #2 [[id number]] Collected: 06/12/24 1834    Lab Status: In process Specimen: Blood from Arm, Right Updated: 06/12/24 1838    Blood culture #1 [[id number]] Collected: 06/12/24 1834    Lab Status: In process Specimen: Blood from Arm, Left Updated: 06/12/24 1838                   XR chest 1 view portable    (Results Pending)              Procedures  Procedures         ED Course  ED Course as of 06/12/24 2240   Wed Jun 12, 2024   1905 Patient met SIRS but  no source. Symptoms are non-specific. Not in patients best interest to start broad spectrum antibiotics as this may very well be a viral infection.   2012 Discussed with patient, suspect viral syndrome.  Labs are consistent with that with atypical lymphocytes.  Likely EBV/mono.  Although Lyme disease also not excluded.  Will treat with doxycycline, if Lyme test negative she can discontinue.                               SBIRT 22yo+      Flowsheet Row Most Recent Value   Initial Alcohol Screen: US AUDIT-C     1. How often do you have a drink containing alcohol? 0 Filed at: 06/12/2024 1832   2. How  many drinks containing alcohol do you have on a typical day you are drinking?  0 Filed at: 06/12/2024 1832   3a. Male UNDER 65: How often do you have five or more drinks on one occasion? 0 Filed at: 06/12/2024 1832   3b. FEMALE Any Age, or MALE 65+: How often do you have 4 or more drinks on one occassion? 0 Filed at: 06/12/2024 1832   Audit-C Score 0 Filed at: 06/12/2024 1832   [place]: How many times in the past year have you...    Used an illegal drug or used a prescription medication for non-medical reasons? Never Filed at: 06/12/2024 1832                      Medical Decision Making  58-year-old female presents with fever chills, tachycardia.  Concern for sepsis although clinically well-appearing.  Possible viral syndrome which is most likely suspect possible influenza versus COVID versus mono    Clinically well-appearing.  Will require CBC look for leukocytosis, CMP to look for abnormal LFTs, assess renal function, look for electrolyte abnormalities.  Will get a UA to look for clinically occult UTI and a chest x-ray to look for clinically occult pneumonia.  She denies any tick exposure however after reassuring testing in the emergency department we will send a Lyme panel as well as a monotest.    Labs reassuring.  Elevated Pro-Jose, nonspecific.  Recommended starting doxycycline for possible Lyme disease given the prevalence here with instructions to discontinue if the testing is negative.  Patient comfortable with this plan.  EKG and troponin ordered to rule out myocarditis which would be atypical for presentation.    Problems Addressed:  Lyme disease: acute illness or injury  Viral syndrome: acute illness or injury    Amount and/or Complexity of Data Reviewed  Labs: ordered. Decision-making details documented in ED Course.  Radiology: ordered and independent interpretation performed. Decision-making details documented in ED Course.    Risk  OTC drugs.  Prescription drug management.             Disposition  Final  diagnoses:   Viral syndrome   Lyme disease     Time reflects when diagnosis was documented in both MDM as applicable and the Disposition within this note       Time User Action Codes Description Comment    6/12/2024  8:14 PM Sotero Arboleda [B34.9] Viral syndrome     6/12/2024  8:14 PM Sotero Arboleda [A69.20] Lyme disease           ED Disposition       ED Disposition   Discharge    Condition   Stable    Date/Time   Wed Jun 12, 2024 2041    Comment   Dalia Loja discharge to home/self care.                   Follow-up Information    None         Discharge Medication List as of 6/12/2024  8:42 PM        START taking these medications    Details   doxycycline hyclate (VIBRAMYCIN) 100 mg capsule Take 1 capsule (100 mg total) by mouth 2 (two) times a day for 14 days, Starting Wed 6/12/2024, Until Wed 6/26/2024, Normal           CONTINUE these medications which have NOT CHANGED    Details   albuterol (PROVENTIL HFA,VENTOLIN HFA) 90 mcg/act inhaler Inhale 2 puffs every 4 (four) hours as needed for wheezing, Starting Mon 4/24/2023, Normal      Budeson-Glycopyrrol-Formoterol (Breztri Aerosphere) 160-9-4.8 MCG/ACT AERO Inhale 2 puffs daily Rinse mouth after use., Starting Tue 12/13/2022, Normal      escitalopram (LEXAPRO) 10 mg tablet TAKE ONE TABLET (10 MG TOTAL) BY MOUTH DAILY, Normal      gabapentin (Neurontin) 100 mg capsule Take 1 capsule (100 mg total) by mouth 3 (three) times a day, Starting Tue 10/10/2023, Normal      levothyroxine 75 mcg tablet Take one tablet daily on empty stomach 1 hour before breakfast and 4 hours apart from calcium and vitamin D supplementation, Normal      phentermine (ADIPEX-P) 37.5 MG tablet Take 1 tablet (37.5 mg total) by mouth in the morning, Starting Tue 4/2/2024, Normal      PreviDent 5000 Booster Plus 1.1 % PSTE Historical Med      rosuvastatin (CRESTOR) 10 MG tablet take 1 tablet by mouth once daily, Starting Wed 8/2/2023, Normal             No discharge procedures  on file.    PDMP Review         Value Time User    PDMP Reviewed  Yes 4/2/2024 10:37 AM Henry Mays MD            ED Provider  Electronically Signed by             Sotero Arboleda DO  06/12/24 1278

## 2024-06-13 LAB
ATRIAL RATE: 108 BPM
B BURGDOR IGG+IGM SER QL IA: NEGATIVE
HETEROPH AB SER QL: NEGATIVE
P AXIS: 63 DEGREES
PR INTERVAL: 158 MS
QRS AXIS: 59 DEGREES
QRSD INTERVAL: 80 MS
QT INTERVAL: 340 MS
QTC INTERVAL: 455 MS
T WAVE AXIS: 55 DEGREES
VENTRICULAR RATE: 108 BPM

## 2024-06-13 PROCEDURE — 93010 ELECTROCARDIOGRAM REPORT: CPT | Performed by: INTERNAL MEDICINE

## 2024-06-13 NOTE — DISCHARGE INSTRUCTIONS
As discussed your labs are nonspecific for an infection, possibly a viral infection.  A Lyme and monotest were sent.  If your Lyme IgM is positive continue the antibiotics.  If it is negative you can discontinue the antibiotics.  There is no specific treatment for mononucleosis.

## 2024-06-17 LAB
BACTERIA BLD CULT: NORMAL
BACTERIA BLD CULT: NORMAL

## 2024-06-18 LAB
BACTERIA BLD CULT: NORMAL
BACTERIA BLD CULT: NORMAL

## 2024-07-16 RX ORDER — PHENTERMINE HYDROCHLORIDE 37.5 MG/1
37.5 TABLET ORAL DAILY
Qty: 30 TABLET | Refills: 0 | Status: SHIPPED | OUTPATIENT
Start: 2024-07-16

## 2024-07-19 DIAGNOSIS — E78.49 OTHER HYPERLIPIDEMIA: ICD-10-CM

## 2024-07-19 RX ORDER — ROSUVASTATIN CALCIUM 10 MG/1
10 TABLET, COATED ORAL DAILY
Qty: 100 TABLET | Refills: 1 | Status: SHIPPED | OUTPATIENT
Start: 2024-07-19

## 2024-08-14 ENCOUNTER — OFFICE VISIT (OUTPATIENT)
Dept: FAMILY MEDICINE CLINIC | Facility: CLINIC | Age: 59
End: 2024-08-14
Payer: COMMERCIAL

## 2024-08-14 VITALS
SYSTOLIC BLOOD PRESSURE: 124 MMHG | HEIGHT: 64 IN | DIASTOLIC BLOOD PRESSURE: 70 MMHG | OXYGEN SATURATION: 99 % | TEMPERATURE: 97.9 F | HEART RATE: 91 BPM | WEIGHT: 150 LBS | RESPIRATION RATE: 16 BRPM | BODY MASS INDEX: 25.61 KG/M2

## 2024-08-14 DIAGNOSIS — J45.30 MILD PERSISTENT ASTHMA WITHOUT COMPLICATION: ICD-10-CM

## 2024-08-14 DIAGNOSIS — E03.8 OTHER SPECIFIED HYPOTHYROIDISM: ICD-10-CM

## 2024-08-14 DIAGNOSIS — E78.49 OTHER HYPERLIPIDEMIA: Primary | ICD-10-CM

## 2024-08-14 PROCEDURE — 99214 OFFICE O/P EST MOD 30 MIN: CPT | Performed by: FAMILY MEDICINE

## 2024-08-14 RX ORDER — PHENTERMINE HYDROCHLORIDE 37.5 MG/1
37.5 TABLET ORAL DAILY
Qty: 30 TABLET | Refills: 0 | Status: SHIPPED | OUTPATIENT
Start: 2024-08-14

## 2024-08-14 NOTE — ASSESSMENT & PLAN NOTE
It was discussed about low-carb diet and regular exercise.  Continue on phentermine.  She was given refill.  She was given samples of Rybelsus.

## 2024-08-14 NOTE — PROGRESS NOTES
Ambulatory Visit  Name: Dalia Loja      : 1965      MRN: [id number]  Encounter Provider: Henry Mays MD  Encounter Date: 2024   Encounter department: [facility] [address] [facility]    Assessment & Plan   1. Other hyperlipidemia  Assessment & Plan:  Continue Crestor 10mg daily. Continue to monitor with lipid panel.  Orders:  -     CBC and differential; Future  -     Comprehensive metabolic panel; Future  -     Lipid Panel with Direct LDL reflex; Future  -     TSH, 3rd generation with Free T4 reflex; Future  2. BMI 25.0-25.9,adult  Assessment & Plan:  It was discussed about low-carb diet and regular exercise.  Continue on phentermine.  She was given refill.  She was given samples of Rybelsus.  Orders:  -     phentermine (ADIPEX-P) 37.5 MG tablet; Take 1 tablet (37.5 mg total) by mouth in the morning  3. Mild persistent asthma without complication  Assessment & Plan:  Continue inhalers. Continue follow-up with pulmonologist.  4. Other specified hypothyroidism  Assessment & Plan:  Continue levothyroxine 75mcg daily. Continue follow-up with endocrinologist.       History of Present Illness     Today for follow-up multiple medical problems.  She has been taking her medications.  She denies any side effect.  She is taking phentermine half tablet daily in addition to Rybelsus sample.  She lost 2 pounds since her last visit.  She has been having a lot of stress lately.  She lost her dog.      Review of Systems   Constitutional:  Negative for chills and fever.   HENT:  Negative for trouble swallowing.    Eyes:  Negative for visual disturbance.   Respiratory:  Negative for cough and shortness of breath.    Cardiovascular:  Negative for chest pain, palpitations and leg swelling.   Gastrointestinal:  Negative for abdominal pain, constipation and diarrhea.   Endocrine: Negative for cold intolerance and heat intolerance.   Genitourinary:  Negative for difficulty urinating and dysuria.    Musculoskeletal:  Negative for gait problem.   Skin:  Negative for rash.   Neurological:  Negative for dizziness, tremors, seizures and headaches.   Hematological:  Negative for adenopathy.   Psychiatric/Behavioral:  Negative for behavioral problems.      Past Medical History:   Diagnosis Date   • Anemia     Post gastric bypass surgery   • Asthma     Allergy induced   • Disease of thyroid gland      Past Surgical History:   Procedure Laterality Date   • CHOLECYSTECTOMY      When I had weight loss surgery   • HERNIA REPAIR      Belly Button   • REDUCTION MAMMAPLASTY Bilateral 1985     Family History   Problem Relation Age of Onset   • Cancer Mother    • Hypertension Father         Has a pacemaker as well.   • Asthma Sister    • No Known Problems Daughter    • No Known Problems Maternal Grandmother    • No Known Problems Paternal Grandmother    • No Known Problems Paternal Aunt      Social History     Tobacco Use   • Smoking status: Never   • Smokeless tobacco: Never   Vaping Use   • Vaping status: Never Used   Substance and Sexual Activity   • Alcohol use: Yes     Comment: occasional   • Drug use: Not Currently   • Sexual activity: Yes     Partners: Male     Birth control/protection: Post-menopausal     Current Outpatient Medications on File Prior to Visit   Medication Sig   • albuterol (PROVENTIL HFA,VENTOLIN HFA) 90 mcg/act inhaler Inhale 2 puffs every 4 (four) hours as needed for wheezing   • escitalopram (LEXAPRO) 10 mg tablet TAKE ONE TABLET (10 MG TOTAL) BY MOUTH DAILY   • levothyroxine 75 mcg tablet Take one tablet daily on empty stomach 1 hour before breakfast and 4 hours apart from calcium and vitamin D supplementation   • PreviDent 5000 Booster Plus 1.1 % PSTE    • rosuvastatin (CRESTOR) 10 MG tablet take 1 tablet by mouth once daily   • [DISCONTINUED] phentermine (ADIPEX-P) 37.5 MG tablet Take 1 tablet (37.5 mg total) by mouth in the morning   • Budeson-Glycopyrrol-Formoterol (Breztri Aerosphere)  "160-9-4.8 MCG/ACT AERO Inhale 2 puffs daily Rinse mouth after use. (Patient not taking: Reported on 6/3/2024)   • gabapentin (Neurontin) 100 mg capsule Take 1 capsule (100 mg total) by mouth 3 (three) times a day (Patient not taking: Reported on 11/9/2023)   • [DISCONTINUED] budesonide (Pulmicort Flexhaler) 90 MCG/ACT inhaler Inhale 1 puff 2 (two) times a day Rinse mouth after use. (Patient not taking: Reported on 10/10/2022)     Allergies   Allergen Reactions   • Sulfa Antibiotics Hives and Rash   • Other Other (See Comments)     Seasonal allergies, Cats.        There is no immunization history on file for this patient.  Objective     /70 (BP Location: Left arm, Patient Position: Sitting, Cuff Size: Standard)   Pulse 91   Temp 97.9 °F (36.6 °C) (Tympanic)   Resp 16   Ht 5' 4\" (1.626 m)   Wt 68 kg (150 lb)   SpO2 99%   BMI 25.75 kg/m²     Physical Exam  Vitals and nursing note reviewed.   Constitutional:       Appearance: She is well-developed.   HENT:      Head: Normocephalic and atraumatic.   Eyes:      Pupils: Pupils are equal, round, and reactive to light.   Cardiovascular:      Rate and Rhythm: Normal rate and regular rhythm.      Heart sounds: Normal heart sounds.   Pulmonary:      Effort: Pulmonary effort is normal.      Breath sounds: Normal breath sounds.   Abdominal:      General: Bowel sounds are normal.      Palpations: Abdomen is soft.   Musculoskeletal:      Cervical back: Normal range of motion and neck supple.   Lymphadenopathy:      Cervical: No cervical adenopathy.   Skin:     General: Skin is warm.   Neurological:      Mental Status: She is alert and oriented to person, place, and time.         "

## 2024-09-11 ENCOUNTER — TELEPHONE (OUTPATIENT)
Dept: ENDOCRINOLOGY | Facility: CLINIC | Age: 59
End: 2024-09-11

## 2024-09-11 ENCOUNTER — TELEPHONE (OUTPATIENT)
Age: 59
End: 2024-09-11

## 2024-09-11 DIAGNOSIS — E03.9 HYPOTHYROIDISM, UNSPECIFIED TYPE: Primary | ICD-10-CM

## 2024-09-11 NOTE — TELEPHONE ENCOUNTER
The pt called to move her appt as she got selected for jury duty. She is going to [facility] lab for her lab work - if they could be printed out and mailed to her. Verified her address in epic. Pt was here last 3/2024. New lab slips will need to be entered first as the prev ones will be  by the time she goes at the very end of the month.

## 2024-10-03 DIAGNOSIS — F41.9 ANXIETY: ICD-10-CM

## 2024-10-03 RX ORDER — ESCITALOPRAM OXALATE 10 MG/1
TABLET ORAL
Qty: 90 TABLET | Refills: 1 | Status: SHIPPED | OUTPATIENT
Start: 2024-10-03

## 2024-10-10 NOTE — ED PROVIDER NOTES
History  Chief Complaint   Patient presents with   • Elbow Pain     Patient reports left elbow pain and stiffness after waking up this morning     Patient is a 25-year-old female presenting to the emergency department for evaluation of left elbow pain stiffness which occurred after waking up today. Patient was seen at her PCP today and he is working her up for the logic processes as she has been having a lot of joint pain discomfort. Patient states the reason why she came to the hospital after the appointment was she was concerned that it may be the start of the cellulitic process went to get evaluated. She denies any fevers or chills chest pain or shortness of breath denies any nausea vomiting diarrhea or constipation denies any dysuria hematuria. She does note aches and pains full body. Patient able to ambulate without any difficulty. Has not been able to take has not taken anything for her pain or discomfort. Prior to Admission Medications   Prescriptions Last Dose Informant Patient Reported? Taking? Budeson-Glycopyrrol-Formoterol (Breztri Aerosphere) 160-9-4.8 MCG/ACT AERO Not Taking  No No   Sig: Inhale 2 puffs daily Rinse mouth after use.    Patient not taking: Reported on 8/29/2023   albuterol (PROVENTIL HFA,VENTOLIN HFA) 90 mcg/act inhaler More than a month  No No   Sig: Inhale 2 puffs every 4 (four) hours as needed for wheezing   escitalopram (LEXAPRO) 10 mg tablet 8/29/2023  No Yes   Sig: TAKE ONE TABLET BY MOUTH ONCE DAILY   levothyroxine 50 mcg tablet 8/29/2023  No Yes   Sig: Take 1 tablet (50 mcg total) by mouth daily in the early morning Take one tablet daily on empty stomach 1 hour before breakfast and 4 hours apart from calcium and vitamin D supplementation   phentermine (ADIPEX-P) 37.5 MG tablet 8/29/2023  No Yes   Sig: Take 1 tablet (37.5 mg total) by mouth in the morning   rosuvastatin (CRESTOR) 10 MG tablet 8/28/2023  No Yes   Sig: take 1 tablet by mouth once daily Last refill: 7/12/15 #90 refill 0  Last office visit: 5/3/24  Next office visit: 11/5/24       Facility-Administered Medications: None       Past Medical History:   Diagnosis Date   • Anemia     Post gastric bypass surgery   • Asthma     Allergy induced   • Disease of thyroid gland        Past Surgical History:   Procedure Laterality Date   • CHOLECYSTECTOMY      When I had weight loss surgery   • HERNIA REPAIR      Belly Button   • REDUCTION MAMMAPLASTY Bilateral 1985       Family History   Problem Relation Age of Onset   • Cancer Mother    • Hypertension Father         Has a pacemaker as well. • Asthma Sister    • No Known Problems Daughter    • No Known Problems Maternal Grandmother    • No Known Problems Paternal Grandmother    • No Known Problems Paternal Aunt      I have reviewed and agree with the history as documented. E-Cigarette/Vaping   • E-Cigarette Use Never User      E-Cigarette/Vaping Substances   • Nicotine No    • THC No    • CBD No    • Flavoring No    • Other No    • Unknown No      Social History     Tobacco Use   • Smoking status: Never   • Smokeless tobacco: Never   Vaping Use   • Vaping Use: Never used   Substance Use Topics   • Alcohol use: Yes     Comment: occasional   • Drug use: Not Currently        Review of Systems   Constitutional: Negative for activity change, chills, fatigue and fever. HENT: Negative for ear pain and sore throat. Eyes: Negative for pain and visual disturbance. Respiratory: Negative for cough and shortness of breath. Cardiovascular: Negative for chest pain and palpitations. Gastrointestinal: Negative for abdominal pain, constipation, diarrhea, nausea and vomiting. Genitourinary: Negative for dysuria and hematuria. Musculoskeletal: Positive for myalgias. Negative for arthralgias and back pain. Left elbow pain   Skin: Negative for color change and rash. Neurological: Negative for dizziness, seizures, syncope, weakness, light-headedness, numbness and headaches. Psychiatric/Behavioral: Negative for agitation.    All other systems reviewed and are negative. Physical Exam  ED Triage Vitals [08/29/23 1334]   Temperature Pulse Respirations Blood Pressure SpO2   (!) 97.1 °F (36.2 °C) 80 16 143/86 100 %      Temp src Heart Rate Source Patient Position - Orthostatic VS BP Location FiO2 (%)   -- Monitor Sitting -- --      Pain Score       3             Orthostatic Vital Signs  Vitals:    08/29/23 1334   BP: 143/86   Pulse: 80   Patient Position - Orthostatic VS: Sitting       Physical Exam  Vitals and nursing note reviewed. Constitutional:       General: She is not in acute distress. Appearance: She is well-developed. HENT:      Head: Normocephalic and atraumatic. Right Ear: External ear normal.      Left Ear: External ear normal.      Nose: Nose normal.      Mouth/Throat:      Mouth: Mucous membranes are moist.   Eyes:      Extraocular Movements: Extraocular movements intact. Conjunctiva/sclera: Conjunctivae normal.   Cardiovascular:      Rate and Rhythm: Normal rate and regular rhythm. Heart sounds: Normal heart sounds. No murmur heard. Pulmonary:      Effort: Pulmonary effort is normal. No respiratory distress. Breath sounds: Normal breath sounds. Abdominal:      General: Abdomen is flat. Palpations: Abdomen is soft. Tenderness: There is no abdominal tenderness. Musculoskeletal:         General: No swelling. Right shoulder: Normal.      Left shoulder: Normal.      Right upper arm: Normal.      Left upper arm: Normal.      Right elbow: Normal.      Left elbow: Swelling present. Normal range of motion. Right forearm: Normal.      Left forearm: Normal.      Right wrist: Normal.      Left wrist: Normal.      Right hand: Normal.      Left hand: Normal.      Cervical back: Normal range of motion and neck supple. Comments: Slight swelling of the left elbow no cellulitic process noted it is not warm to touch. Skin:     General: Skin is warm and dry.       Capillary Refill: Capillary refill takes less than 2 seconds. Neurological:      General: No focal deficit present. Mental Status: She is alert and oriented to person, place, and time. Psychiatric:         Mood and Affect: Mood normal.         Behavior: Behavior normal.         ED Medications  Medications   acetaminophen (TYLENOL) tablet 650 mg (650 mg Oral Given 8/29/23 1342)   ibuprofen (MOTRIN) tablet 600 mg (600 mg Oral Given 8/29/23 1342)       Diagnostic Studies  Results Reviewed     None                 No orders to display         Procedures  Procedures      ED Course  ED Course as of 08/29/23 1347   Tue Aug 29, 2023   1339 Blood Pressure: 143/86   1339 Temperature(!): 97.1 °F (36.2 °C)   1339 Pulse: 80   1339 Respirations: 16   1339 SpO2: 100 %                             SBIRT 20yo+    Flowsheet Row Most Recent Value   Initial Alcohol Screen: US AUDIT-C     1. How often do you have a drink containing alcohol? 2 Filed at: 08/29/2023 133   2. How many drinks containing alcohol do you have on a typical day you are drinking? 0 Filed at: 08/29/2023 1336   3a. Male UNDER 65: How often do you have five or more drinks on one occasion? 0 Filed at: 08/29/2023 1335   3b. FEMALE Any Age, or MALE 65+: How often do you have 4 or more drinks on one occassion? 0 Filed at: 08/29/2023 1339   Audit-C Score 2 Filed at: 08/29/2023 1339   [place]: How many times in the past year have you. .. Used an illegal drug or used a prescription medication for non-medical reasons? Never Filed at: 08/29/2023 1336                Medical Decision Making  Patient is a well-appearing 25-year-old female presenting to the emergency department for evaluation of left elbow pain and stiffness. On exam the patient does have some swelling at the elbow is not hot to touch there is no cellulitic process. Patient has full range of motion but notes it feels stiff. Muscle strength testing negative for acute pathology.   Offered patient tap of the fluid which she declined at this time. She just wanted to make sure that it was not cellulitis. Also spoke with patient the fact that it is atraumatic no need for an x-ray at this point. He denies any numbness or tingling in the extremity. Patient states she was just seen at her PCP today and is work being worked up for rheumatologic processes. I advised patient to schedule follow-up appoint with her PCP in a few days for reevaluation. Advised to continue over-the-counter NSAIDs and Tylenol for symptomatic relief. We will treat now with Tylenol and Motrin. Patient is aware to come back to the hospital any new worsening or concerning symptoms patient has no questions or concerns she stable for discharge. Risk  OTC drugs. Prescription drug management. Disposition  Final diagnoses:   Left elbow pain     Time reflects when diagnosis was documented in both MDM as applicable and the Disposition within this note     Time User Action Codes Description Comment    8/29/2023  1:37 PM Gloria David Alvarez [J90.037] Left elbow pain       ED Disposition     ED Disposition   Discharge    Condition   Stable    Date/Time   Tue Aug 29, 2023  1:36 PM    Comment   Princess Muller discharge to home/self care.                Follow-up Information     Follow up With Specialties Details Why Contact Info Additional Information    Zara Carrington MD Family Medicine Schedule an appointment as soon as possible for a visit  for follow up [address] [facility]  [phone]       [address] [facility] Emergency Department Emergency Medicine Go to  As needed, If symptoms worsen [address] [place] [phone]  [address] [place] [address] Emergency Department, [address] [place] [address], [address]          Discharge Medication List as of 8/29/2023  1:40 PM      CONTINUE these medications which have NOT CHANGED    Details   albuterol (PROVENTIL HFA,VENTOLIN HFA) 90 mcg/act inhaler Inhale 2 puffs every 4 (four) hours as needed for wheezing, Starting Mon 4/24/2023, Normal      Budeson-Glycopyrrol-Formoterol (Breztri Aerosphere) 160-9-4.8 MCG/ACT AERO Inhale 2 puffs daily Rinse mouth after use., Starting Tue 12/13/2022, Normal      escitalopram (LEXAPRO) 10 mg tablet TAKE ONE TABLET BY MOUTH ONCE DAILY, Starting Wed 8/23/2023, Normal      levothyroxine 50 mcg tablet Take 1 tablet (50 mcg total) by mouth daily in the early morning Take one tablet daily on empty stomach 1 hour before breakfast and 4 hours apart from calcium and vitamin D supplementation, Starting Tue 8/29/2023, Normal      phentermine (ADIPEX-P) 37.5 MG tablet Take 1 tablet (37.5 mg total) by mouth in the morning, Starting Tue 8/29/2023, Normal      rosuvastatin (CRESTOR) 10 MG tablet take 1 tablet by mouth once daily, Starting Wed 8/2/2023, Normal           No discharge procedures on file. PDMP Review       Value Time User    PDMP Reviewed  Yes 8/29/2023  9:07 AM Salena Pro MD           ED Provider  Attending physically available and evaluated Ching Haro. I managed the patient along with the ED Attending.     Electronically Signed by         William Dumont DO  08/29/23 3692

## 2024-10-13 DIAGNOSIS — E03.9 HYPOTHYROIDISM, UNSPECIFIED TYPE: ICD-10-CM

## 2024-10-14 RX ORDER — LEVOTHYROXINE SODIUM 75 UG/1
TABLET ORAL
Qty: 30 TABLET | Refills: 5 | Status: SHIPPED | OUTPATIENT
Start: 2024-10-14

## 2024-10-16 ENCOUNTER — OFFICE VISIT (OUTPATIENT)
Dept: FAMILY MEDICINE CLINIC | Facility: CLINIC | Age: 59
End: 2024-10-16
Payer: COMMERCIAL

## 2024-10-16 VITALS
WEIGHT: 152.8 LBS | HEART RATE: 85 BPM | DIASTOLIC BLOOD PRESSURE: 80 MMHG | OXYGEN SATURATION: 98 % | BODY MASS INDEX: 26.09 KG/M2 | HEIGHT: 64 IN | TEMPERATURE: 97.6 F | SYSTOLIC BLOOD PRESSURE: 126 MMHG | RESPIRATION RATE: 16 BRPM

## 2024-10-16 DIAGNOSIS — F41.9 ANXIETY: ICD-10-CM

## 2024-10-16 DIAGNOSIS — E78.49 OTHER HYPERLIPIDEMIA: Primary | ICD-10-CM

## 2024-10-16 DIAGNOSIS — R73.9 HYPERGLYCEMIA: ICD-10-CM

## 2024-10-16 PROCEDURE — 99214 OFFICE O/P EST MOD 30 MIN: CPT | Performed by: FAMILY MEDICINE

## 2024-10-16 NOTE — PROGRESS NOTES
Ambulatory Visit  Name: Dalia Loja      : 1965      MRN: [id number]  Encounter Provider: Henry Mays MD  Encounter Date: 10/16/2024   Encounter department: [facility] [facility]    Assessment & Plan  Other hyperlipidemia  History of elevated cholesterol in . Routine labs ordered in office today, will continue to monitor.   Orders:    CBC and differential; Future    Lipid Panel with Direct LDL reflex; Future    Comprehensive metabolic panel; Future    TSH, 3rd generation with Free T4 reflex; Future    CBC and differential    Lipid Panel with Direct LDL reflex    Comprehensive metabolic panel    TSH, 3rd generation with Free T4 reflex    Hyperglycemia  Last CMP in hospital was not fasting. Check A1C due to recent family dx of T2M. Will continue to monitor.   Orders:    Hemoglobin A1C; Future    Hemoglobin A1C    BMI 25.0-25.9,adult  Pt gaining weight. Instructed to take full dose of phentermine if able to tolerate. Given Ozempic sample in office today, instructed on how to use. Pt educated regarding side effects. Encouraged to contact the office with concerns and follow up in 1 month. Will continue to monitor.        Anxiety  Well controlled with lexapro 10 mg. Will continue to monitor.           History of Present Illness      is a 59 y.o. female with a PMH of hypothyroidism who presents for a weight management follow up. She is currently taking half phentermine 37.5 mg tablet daily and was given samples of Rybelsus at her last visit. She denies side effects or medication concerns but does not feel it is helping her to lose weight. She reports her goal weight is around 140 lb, she is currently 152 lb in office today. She eats a well balanced diet and walks her dogs for exercise. She currently takes lexapro 10 mg and denies feelings of anxiety of depression. She denies N/V/D, chest pain and SOB.       History obtained from : patient  Review of Systems   Constitutional:   Negative for fatigue and fever.   HENT:  Negative for congestion and sore throat.    Eyes:  Negative for pain and visual disturbance.   Respiratory:  Negative for cough and shortness of breath.    Cardiovascular:  Negative for chest pain and leg swelling.   Gastrointestinal:  Negative for abdominal pain and constipation.   Endocrine: Negative for cold intolerance and heat intolerance.   Genitourinary:  Negative for dysuria.   Musculoskeletal:  Negative for arthralgias and myalgias.   Skin:  Negative for rash.   Neurological:  Negative for dizziness and headaches.   Psychiatric/Behavioral:  The patient is not nervous/anxious.      Current Outpatient Medications on File Prior to Visit   Medication Sig Dispense Refill    albuterol (PROVENTIL HFA,VENTOLIN HFA) 90 mcg/act inhaler Inhale 2 puffs every 4 (four) hours as needed for wheezing 6.7 g 0    escitalopram (LEXAPRO) 10 mg tablet TAKE ONE TABLET (10 MG TOTAL) BY MOUTH DAILY 90 tablet 1    levothyroxine 75 mcg tablet Take one tablet daily on empty stomach 1 hour before breakfast and 4 hours apart from calcium and vitamin D supplementation 30 tablet 5    phentermine (ADIPEX-P) 37.5 MG tablet Take 1 tablet (37.5 mg total) by mouth in the morning 30 tablet 0    PreviDent 5000 Booster Plus 1.1 % PSTE       rosuvastatin (CRESTOR) 10 MG tablet Take 1 tablet (10 mg total) by mouth daily 30 tablet 0    Budeson-Glycopyrrol-Formoterol (Breztri Aerosphere) 160-9-4.8 MCG/ACT AERO Inhale 2 puffs daily Rinse mouth after use. (Patient not taking: Reported on 6/3/2024) 10.7 g 0    gabapentin (Neurontin) 100 mg capsule Take 1 capsule (100 mg total) by mouth 3 (three) times a day (Patient not taking: Reported on 11/9/2023) 90 capsule 0    [DISCONTINUED] budesonide (Pulmicort Flexhaler) 90 MCG/ACT inhaler Inhale 1 puff 2 (two) times a day Rinse mouth after use. (Patient not taking: Reported on 10/10/2022) 1 each 2     No current facility-administered medications on file prior to visit.  "     Social History     Tobacco Use    Smoking status: Never    Smokeless tobacco: Never   Vaping Use    Vaping status: Never Used   Substance and Sexual Activity    Alcohol use: Yes     Comment: occasional    Drug use: Not Currently    Sexual activity: Yes     Partners: Male     Birth control/protection: Post-menopausal         Objective     /80 (BP Location: Left arm, Patient Position: Sitting, Cuff Size: Standard)   Pulse 85   Temp 97.6 °F (36.4 °C) (Tympanic)   Resp 16   Ht 5' 4\" (1.626 m)   Wt 69.3 kg (152 lb 12.8 oz)   SpO2 98%   BMI 26.23 kg/m²     Physical Exam  Vitals and nursing note reviewed.   Constitutional:       Appearance: She is well-developed.   HENT:      Head: Normocephalic and atraumatic.   Eyes:      Pupils: Pupils are equal, round, and reactive to light.   Cardiovascular:      Rate and Rhythm: Normal rate and regular rhythm.      Heart sounds: Normal heart sounds.   Pulmonary:      Effort: Pulmonary effort is normal.      Breath sounds: Normal breath sounds.   Abdominal:      General: Bowel sounds are normal.      Palpations: Abdomen is soft.   Musculoskeletal:      Cervical back: Normal range of motion and neck supple.   Lymphadenopathy:      Cervical: No cervical adenopathy.   Skin:     General: Skin is warm.   Neurological:      Mental Status: She is alert and oriented to person, place, and time.         "

## 2024-10-16 NOTE — ASSESSMENT & PLAN NOTE
History of elevated cholesterol in 2022. Routine labs ordered in office today, will continue to monitor.   Orders:    CBC and differential; Future    Lipid Panel with Direct LDL reflex; Future    Comprehensive metabolic panel; Future    TSH, 3rd generation with Free T4 reflex; Future    CBC and differential    Lipid Panel with Direct LDL reflex    Comprehensive metabolic panel    TSH, 3rd generation with Free T4 reflex

## 2024-10-16 NOTE — ASSESSMENT & PLAN NOTE
Pt gaining weight. Instructed to take full dose of phentermine if able to tolerate. Given Ozempic sample in office today, instructed on how to use. Pt educated regarding side effects. Encouraged to contact the office with concerns and follow up in 1 month. Will continue to monitor.

## 2024-10-16 NOTE — ASSESSMENT & PLAN NOTE
Last CMP in hospital was not fasting. Check A1C due to recent family dx of T2M. Will continue to monitor.   Orders:    Hemoglobin A1C; Future    Hemoglobin A1C

## 2024-11-13 ENCOUNTER — OFFICE VISIT (OUTPATIENT)
Dept: FAMILY MEDICINE CLINIC | Facility: CLINIC | Age: 59
End: 2024-11-13
Payer: COMMERCIAL

## 2024-11-13 VITALS
OXYGEN SATURATION: 99 % | SYSTOLIC BLOOD PRESSURE: 122 MMHG | WEIGHT: 148 LBS | RESPIRATION RATE: 16 BRPM | HEIGHT: 64 IN | BODY MASS INDEX: 25.27 KG/M2 | HEART RATE: 86 BPM | TEMPERATURE: 96.4 F | DIASTOLIC BLOOD PRESSURE: 70 MMHG

## 2024-11-13 DIAGNOSIS — J20.8 ACUTE BRONCHITIS DUE TO OTHER SPECIFIED ORGANISMS: ICD-10-CM

## 2024-11-13 DIAGNOSIS — J01.80 ACUTE NON-RECURRENT SINUSITIS OF OTHER SINUS: Primary | ICD-10-CM

## 2024-11-13 PROCEDURE — 99213 OFFICE O/P EST LOW 20 MIN: CPT | Performed by: FAMILY MEDICINE

## 2024-11-13 RX ORDER — METHYLPREDNISOLONE 4 MG/1
TABLET ORAL
Qty: 21 EACH | Refills: 0 | Status: SHIPPED | OUTPATIENT
Start: 2024-11-13

## 2024-11-13 RX ORDER — PHENTERMINE HYDROCHLORIDE 37.5 MG/1
37.5 TABLET ORAL DAILY
Qty: 30 TABLET | Refills: 0 | Status: SHIPPED | OUTPATIENT
Start: 2024-11-13

## 2024-11-13 RX ORDER — ALBUTEROL SULFATE 90 UG/1
2 INHALANT RESPIRATORY (INHALATION) EVERY 4 HOURS PRN
Qty: 6.7 G | Refills: 0 | Status: SHIPPED | OUTPATIENT
Start: 2024-11-13

## 2024-11-13 RX ORDER — AZITHROMYCIN 250 MG/1
TABLET, FILM COATED ORAL
Qty: 6 TABLET | Refills: 0 | Status: SHIPPED | OUTPATIENT
Start: 2024-11-13 | End: 2024-11-17

## 2024-11-13 NOTE — ASSESSMENT & PLAN NOTE
Patient lost weight from last visit. Continue taking half a tablet of the Phentermine 37.5 mg daily to assist with maintenance    Provided ozempic sample   Will continue to monitor      Orders:    phentermine (ADIPEX-P) 37.5 MG tablet; Take 1 tablet (37.5 mg total) by mouth in the morning

## 2024-11-13 NOTE — PROGRESS NOTES
Ambulatory Visit  Name: Dalia Loja      : 1965      MRN: [id number]  Encounter Provider: Henry Mays MD  Encounter Date: 2024   Encounter department: [facility] [address] [facility]    Assessment & Plan  Acute non-recurrent sinusitis of other sinus    Orders:    methylPREDNISolone 4 MG tablet therapy pack; Use as directed on package    azithromycin (ZITHROMAX) 250 mg tablet; Take 2 tablets today then 1 tablet daily x 4 days    Acute bronchitis due to other specified organisms  Continue to inhaler as needed for wheezing  Orders:    albuterol (PROVENTIL HFA,VENTOLIN HFA) 90 mcg/act inhaler; Inhale 2 puffs every 4 (four) hours as needed for wheezing    BMI 25.0-25.9,adult  Patient lost weight from last visit. Continue taking half a tablet of the Phentermine 37.5 mg daily to assist with maintenance    Provided ozempic sample   Will continue to monitor      Orders:    phentermine (ADIPEX-P) 37.5 MG tablet; Take 1 tablet (37.5 mg total) by mouth in the morning       History of Present Illness     Dalia is a 59 year old female with PMH of asthma, hyperlipidemia, hyperglycemia, and anxiety presenting to the office with productive cough for the past four days. Additionally, she has a sore throat, congestion, and rhinorrhea. She has been using her inhaler more recently due to the cough. She denies fever, chills, hemoptysis, chest pain, nausea, vomiting, diarrhea, and constipation. She has been gradually  improving but is concerned about going on a cruise this weekend.  She takes phentermine 37.5 mg half a tablet daily and Lexapro 10 mg daily. She states she is doing well on these medications.     Cough  This is a new problem. The current episode started 1 to 4 weeks ago. The problem has been gradually improving. The problem occurs hourly. The cough is Productive of sputum. Associated symptoms include nasal congestion, rhinorrhea and a sore throat. Pertinent negatives include no chest pain,  "fever, headaches or rash.         Review of Systems   Constitutional:  Negative for fever.   HENT:  Positive for congestion, rhinorrhea and sore throat. Negative for hearing loss.    Eyes:  Negative for visual disturbance.   Respiratory:  Positive for cough.    Cardiovascular:  Negative for chest pain and palpitations.   Gastrointestinal:  Negative for abdominal pain, constipation, diarrhea, nausea and vomiting.   Genitourinary:  Negative for dysuria.   Skin:  Negative for color change and rash.   Neurological:  Negative for headaches.           Objective     /70 (BP Location: Left arm, Patient Position: Sitting, Cuff Size: Standard)   Pulse 86   Temp (!) 96.4 °F (35.8 °C) (Tympanic)   Resp 16   Ht 5' 4\" (1.626 m)   Wt 67.1 kg (148 lb)   SpO2 99%   BMI 25.40 kg/m²     Physical Exam  Vitals and nursing note reviewed.   Constitutional:       General: She is not in acute distress.  HENT:      Head: Normocephalic and atraumatic.      Right Ear: Tympanic membrane normal.      Left Ear: Tympanic membrane normal.      Mouth/Throat:      Mouth: Mucous membranes are moist.   Eyes:      Conjunctiva/sclera: Conjunctivae normal.   Cardiovascular:      Rate and Rhythm: Normal rate and regular rhythm.      Heart sounds: Normal heart sounds. No murmur heard.  Pulmonary:      Effort: Pulmonary effort is normal. No respiratory distress.      Breath sounds: Normal breath sounds.   Musculoskeletal:         General: No swelling.      Cervical back: Neck supple.   Skin:     General: Skin is warm and dry.      Capillary Refill: Capillary refill takes less than 2 seconds.   Neurological:      Mental Status: She is alert.   Psychiatric:         Mood and Affect: Mood normal.         "

## 2024-11-14 PROBLEM — J01.90 ACUTE INFECTION OF NASAL SINUS: Status: ACTIVE | Noted: 2023-05-15

## 2024-11-14 NOTE — ASSESSMENT & PLAN NOTE
Orders:    methylPREDNISolone 4 MG tablet therapy pack; Use as directed on package    azithromycin (ZITHROMAX) 250 mg tablet; Take 2 tablets today then 1 tablet daily x 4 days

## 2024-11-27 ENCOUNTER — TELEPHONE (OUTPATIENT)
Dept: ENDOCRINOLOGY | Facility: CLINIC | Age: 59
End: 2024-11-27

## 2024-12-02 LAB
ALBUMIN SERPL-MCNC: 4.2 G/DL (ref 3.5–5.7)
ALP SERPL-CCNC: 74 U/L (ref 35–120)
ALT SERPL-CCNC: 59 U/L
ANION GAP SERPL CALCULATED.3IONS-SCNC: 9 MMOL/L (ref 3–11)
AST SERPL-CCNC: 35 U/L
BASOPHILS # BLD AUTO: 0.1 THOU/CMM (ref 0–0.1)
BASOPHILS NFR BLD AUTO: 1 %
BILIRUB SERPL-MCNC: 0.6 MG/DL (ref 0.2–1)
BUN SERPL-MCNC: 7 MG/DL (ref 7–25)
CALCIUM SERPL-MCNC: 9 MG/DL (ref 8.5–10.5)
CHLORIDE SERPL-SCNC: 105 MMOL/L (ref 100–109)
CHOLEST SERPL-MCNC: 148 MG/DL
CHOLEST/HDLC SERPL: 2.3 {RATIO}
CO2 SERPL-SCNC: 29 MMOL/L (ref 21–31)
CREAT SERPL-MCNC: 0.78 MG/DL (ref 0.4–1.1)
CYTOLOGY CMNT CVX/VAG CYTO-IMP: ABNORMAL
DIFFERENTIAL METHOD BLD: NORMAL
EOSINOPHIL # BLD AUTO: 0.5 THOU/CMM (ref 0–0.5)
EOSINOPHIL NFR BLD AUTO: 7 %
ERYTHROCYTE [DISTWIDTH] IN BLOOD BY AUTOMATED COUNT: 13.4 % (ref 12–16)
EST. AVERAGE GLUCOSE BLD GHB EST-MCNC: 111 MG/DL
FERRITIN SERPL-MCNC: 543 NG/ML (ref 11–306.8)
GFR/BSA.PRED SERPLBLD CYS-BASED-ARV: 88 ML/MIN/{1.73_M2}
GLUCOSE SERPL-MCNC: 86 MG/DL (ref 65–99)
HBA1C MFR BLD: 5.5 %
HCT VFR BLD AUTO: 38.8 % (ref 35–43)
HDLC SERPL-MCNC: 64 MG/DL (ref 23–92)
HGB BLD-MCNC: 13.3 G/DL (ref 11.5–14.5)
IRON SATN MFR SERPL: 30 % (ref 20–50)
IRON SERPL-MCNC: 107 UG/DL (ref 50–212)
LDLC SERPL CALC-MCNC: 65 MG/DL
LYMPHOCYTES # BLD AUTO: 2.4 THOU/CMM (ref 1–3)
LYMPHOCYTES NFR BLD AUTO: 34 %
MCH RBC QN AUTO: 31.8 PG (ref 26–34)
MCHC RBC AUTO-ENTMCNC: 34.2 G/DL (ref 32–37)
MCV RBC AUTO: 93 FL (ref 80–100)
MONOCYTES # BLD AUTO: 0.5 THOU/CMM (ref 0.3–1)
MONOCYTES NFR BLD AUTO: 7 %
NEUTROPHILS # BLD AUTO: 3.5 THOU/CMM (ref 1.8–7.8)
NEUTROPHILS NFR BLD AUTO: 51 %
NONHDLC SERPL-MCNC: 84 MG/DL
PLATELET # BLD AUTO: 303 THOU/CMM (ref 140–350)
PMV BLD REES-ECKER: 7.5 FL (ref 7.5–11.3)
POTASSIUM SERPL-SCNC: 3.8 MMOL/L (ref 3.5–5.2)
PROT SERPL-MCNC: 6.3 G/DL (ref 6.3–8.3)
RBC # BLD AUTO: 4.18 MILL/CMM (ref 3.7–4.7)
SODIUM SERPL-SCNC: 143 MMOL/L (ref 135–145)
TIBC SERPL-MCNC: 354 UG/DL (ref 260–430)
TRANSFERRIN SERPL-MCNC: 253 MG/DL (ref 203–362)
TRIGL SERPL-MCNC: 93 MG/DL
TSH SERPL-ACNC: 2.65 UIU/ML (ref 0.45–5.33)
WBC # BLD AUTO: 6.9 THOU/CMM (ref 4–10)

## 2024-12-03 ENCOUNTER — OFFICE VISIT (OUTPATIENT)
Dept: ENDOCRINOLOGY | Facility: CLINIC | Age: 59
End: 2024-12-03
Payer: COMMERCIAL

## 2024-12-03 VITALS
HEART RATE: 85 BPM | TEMPERATURE: 97.3 F | SYSTOLIC BLOOD PRESSURE: 134 MMHG | BODY MASS INDEX: 24.92 KG/M2 | WEIGHT: 146 LBS | HEIGHT: 64 IN | DIASTOLIC BLOOD PRESSURE: 84 MMHG | OXYGEN SATURATION: 98 %

## 2024-12-03 DIAGNOSIS — E78.2 MIXED HYPERLIPIDEMIA: ICD-10-CM

## 2024-12-03 DIAGNOSIS — M85.80 OSTEOPENIA, UNSPECIFIED LOCATION: ICD-10-CM

## 2024-12-03 DIAGNOSIS — E03.9 HYPOTHYROIDISM, UNSPECIFIED TYPE: Primary | ICD-10-CM

## 2024-12-03 DIAGNOSIS — Z98.84 S/P BARIATRIC SURGERY: ICD-10-CM

## 2024-12-03 PROCEDURE — 99214 OFFICE O/P EST MOD 30 MIN: CPT | Performed by: STUDENT IN AN ORGANIZED HEALTH CARE EDUCATION/TRAINING PROGRAM

## 2024-12-03 RX ORDER — LEVOTHYROXINE SODIUM 75 UG/1
TABLET ORAL
Qty: 90 TABLET | Refills: 3 | Status: SHIPPED | OUTPATIENT
Start: 2024-12-03

## 2024-12-03 NOTE — ASSESSMENT & PLAN NOTE
Tsh is in normal range. C/w LT4. F/u in 12-mo  Orders:    levothyroxine 75 mcg tablet; Take one tablet daily on empty stomach 1 hour before breakfast and 4 hours apart from calcium and vitamin D supplementation    TSH, 3rd generation; Future    T4, free; Future

## 2024-12-03 NOTE — PROGRESS NOTES
"Name: Dalia Loja      : 1965      MRN: [id number]  Encounter Provider: Kenneth Merino DO  Encounter Date: 12/3/2024   Encounter department: [facility]  :  Assessment & Plan  Hypothyroidism, unspecified type  Tsh is in normal range. C/w LT4. F/u in 12-mo  Orders:    levothyroxine 75 mcg tablet; Take one tablet daily on empty stomach 1 hour before breakfast and 4 hours apart from calcium and vitamin D supplementation    TSH, 3rd generation; Future    T4, free; Future    S/P bariatric surgery  She is on bariatric mvi and calcium.   Orders:    Comprehensive metabolic panel; Future    PTH, intact; Future    Osteopenia, unspecified location  Recommend updated DXA study. She is on MVI and vit D  Orders:    DXA bone density spine hip and pelvis    Vitamin D 25 hydroxy; Future    Mixed hyperlipidemia  Good control on statin       RTC 12-mo    History of Present Illness     HPI  Dalia Loja is a 59 y.o. female who presents in follow up of hypothyroidism. No acute concerns today. Here to review labs. Remains on levothyroxine 75 mcg daily. She is also on calcium and bariatric mvi. She has osteopenia. No fractures. For HLD, she is on rosuvastatin, which is well tolerated.         Review of Systems   All other systems reviewed and are negative.         Objective   /84 (BP Location: Left arm, Patient Position: Sitting)   Pulse 85   Temp (!) 97.3 °F (36.3 °C)   Ht 5' 4\" (1.626 m)   Wt 66.2 kg (146 lb)   SpO2 98%   BMI 25.06 kg/m²      Physical Exam  Vitals reviewed.   Constitutional:       General: She is not in acute distress.     Appearance: Normal appearance.   HENT:      Head: Normocephalic and atraumatic.   Eyes:      General: No scleral icterus.     Conjunctiva/sclera: Conjunctivae normal.   Pulmonary:      Effort: Pulmonary effort is normal. No respiratory distress.   Musculoskeletal:         General: No deformity.      Cervical back: Normal " range of motion.   Neurological:      General: No focal deficit present.      Mental Status: She is alert.   Psychiatric:         Mood and Affect: Mood normal.         Behavior: Behavior normal.         Component      Latest Ref Rng 12/2/2024   Hemoglobin      11.5 - 14.5 g/dL 13.3    HCT      35.0 - 43.0 % 38.8    White Blood Cell Count      4.0 - 10.0 thou/cmm 6.9    Red Blood Cell Count      3.70 - 4.70 mill/cmm 4.18    Platelet Count      140 - 350 thou/cmm 303    SL AMB MPV      7.5 - 11.3 fL 7.5    MCV      80 - 100 fL 93    MCH      26.0 - 34.0 pg 31.8    MCHC.      32.0 - 37.0 g/dL 34.2    RDW      12.0 - 16.0 % 13.4    DIFF COMMENT AUTO    Neutrophils (Absolute)      1.8 - 7.8 thou/cmm 3.5    Lymphocytes (Absolute)      1.0 - 3.0 thou/cmm 2.4    Monocytes (Absolute)      0.3 - 1.0 thou/cmm 0.5    Eosinophils (Absolute)      0.0 - 0.5 thou/cmm 0.5    Basophils ABS      0.0 - 0.1 thou/cmm 0.1    Neutrophils      % 51    Lymphocytes      % 34    Monocytes      % 7    Eosinophils      % 7    Basophils PCT      % 1    GLUCOSE      65 - 99 mg/dL 86    BUN      7 - 25 mg/dL 7    Creatinine      0.40 - 1.10 mg/dL 0.78    Sodium      135 - 145 mmol/L 143    Potassium      3.5 - 5.2 mmol/L 3.8    Chloride      100 - 109 mmol/L 105    Carbon Dioxide      21 - 31 mmol/L 29    Calcium      8.5 - 10.5 mg/dL 9.0    ALK PHOS      35 - 120 U/L 74    Albumin      3.5 - 5.7 g/dL 4.2    Total Bilirubin      0.2 - 1.0 mg/dL 0.6    Total Protein      6.3 - 8.3 g/dL 6.3    AST      <41 U/L 35    ALT      <56 U/L 59 (H)    ANION GAP      3 - 11  9    GFR, Calculated      >59  88    Comment (Note)    Cholesterol      <200 mg/dL 148    Triglycerides      <150 mg/dL 93    HDL CHOLESTEROL LIPOPROTEIN      23 - 92 mg/dL 64    NON HDL CHOL. (LDL+VLDL)      <160 mg/dL 84    Chol/HDL Ratio 2.3    LDL Calculated      <130 mg/dL 65    Iron      50 - 212 ug/dL 107    TRANSFERRIN      203 - 362 mg/dL 253    Iron Saturation      20 - 50 % 30     TIBC      260 - 430 ug/dL 354    Hemoglobin A1C      <5.7 % 5.5    eAG, EST AVG Glucose      mg/dL 111    TSH, POC      0.45 - 5.33 uIU/mL 2.65    FERRITIN      11.0 - 306.8 ng/mL 543.0 (H)       Legend:  (H) High

## 2024-12-03 NOTE — ASSESSMENT & PLAN NOTE
She is on bariatric mvi and calcium.   Orders:    Comprehensive metabolic panel; Future    PTH, intact; Future

## 2024-12-04 ENCOUNTER — RESULTS FOLLOW-UP (OUTPATIENT)
Dept: FAMILY MEDICINE CLINIC | Facility: CLINIC | Age: 59
End: 2024-12-04

## 2024-12-06 NOTE — TELEPHONE ENCOUNTER
----- Message from Wendy Osorio PA-C sent at 12/4/2024 11:54 AM EST -----  All labs are stable. Her cholesterol has greatly improved.

## 2024-12-14 PROBLEM — J01.90 ACUTE INFECTION OF NASAL SINUS: Status: RESOLVED | Noted: 2023-05-15 | Resolved: 2024-12-14

## 2025-01-02 ENCOUNTER — TELEPHONE (OUTPATIENT)
Age: 60
End: 2025-01-02

## 2025-01-06 RX ORDER — PHENTERMINE HYDROCHLORIDE 37.5 MG/1
37.5 TABLET ORAL DAILY
Qty: 30 TABLET | Refills: 0 | Status: SHIPPED | OUTPATIENT
Start: 2025-01-06 | End: 2025-01-12 | Stop reason: SDUPTHER

## 2025-01-06 NOTE — TELEPHONE ENCOUNTER
Pt last seen 24 and is scheduled for 25. [facility] and [facility]    [facility]  Patients  Select Patient Id Name  [facility]   1 STEPHEN CHAN 1965 F  [facility] [place]      Search Criteria  Name Date of Birth Date Range  stephen chan 1965 To 2025  [facility]qu[facility] Name [facility]ed Date  [facility] 21[facility]14[facility]  Summary  Prescriptions  5  Prescribers  1  Pharmacies  1  Drug Classes  Benzodiazepines  0  Stimulants  5  Opioids  0  Muscle Relaxants  0  Opioid Dosage  Total MME for Active Prescriptions  0    Average MME  0.00         Prescriptions  Notifications    Prescribers  Pharmacies  MME Graph    Show All     [place]   Drug Categories:      Stimulants     Show  10  entries  Search:  Patient Id Prescription # Filled Written Drug Label Qty Days Strength MME** Prescriber Pharmacy Payment REFILL #/Auth State Detail  2024 Phentermine Hcl (Tablet) 30.0 30 37.5 MG NA MASSIMO(MD) [facility] Commercial [place] 0 / 0 [place]   1 [id number] 08/15/2024 2024 Phentermine Hcl (Tablet) 30.0 30 37.5 MG NA VICTOR MANUELM(MD) [facility] [place] 0 / 0 [place]   1 [id number] 2024 Phentermine Hcl (Tablet) 30.0 30 37.5 MG NA MIMI) [facility] [place] 0 / 0 [place]   1 [id number] 2024 Phentermine Hcl (Tablet) 30.0 30 37.5 MG NA VICTOR MANUELM(MD) [facility] [place] 0 / 0 [place]   1 [id number] 2024 Phentermine Hcl (Tablet) 30.0 30 37.5 MG NA WASSIM(MD) [facility] [place] 0 / 0 [place]   Showing 1 to 5 of 5 [id number]    * Per CDC guidance, the conversion factors and associated daily morphine milligram equivalents for drugs prescribed as part of medication-assisted treatment for opioid use disorder  should not be used to benchmark against dosage thresholds meant for opioids prescribed for pain.    Report Disclaimer:    Information from the [place] Prescription Drug Monitoring Program (PDMP) database is protected health information and any information accessed must be treated as confidential. Any person who knowingly or intentionally makes an unauthorized disclosure of information from the PDMP database will be subject to civil and criminal penalties as set forth in the ABC-MAP Act 191 of 2014; Act of Oct. 27, 2014, P.L. 2911, No. 191.    The information in the PDMP database is submitted by pharmacies and may contain errors and omissions. Additionally, pharmacies may submit extraneous non-controlled substance dispensations to the PDMP database; if a non-controlled substance is present on one patient’s Prescription Report, it should not be assumed that this same medication will be reported on another patient’s Prescription Report. Independent verification of prescription information with pharmacies and prescribers may sometimes be prudent or necessary.    Educational content  [facility] MME calculation guidelines  [place] Prescribing Guidelines  Letter Regarding the Misapplication of Prescribing Guidelines  Guide for Appropriate Tapering or Discontinuation of Long-Term Opioid Use  #[id number]

## 2025-01-12 DIAGNOSIS — E03.9 HYPOTHYROIDISM, UNSPECIFIED TYPE: ICD-10-CM

## 2025-01-14 RX ORDER — PHENTERMINE HYDROCHLORIDE 37.5 MG/1
37.5 TABLET ORAL DAILY
Qty: 30 TABLET | Refills: 0 | Status: SHIPPED | OUTPATIENT
Start: 2025-01-14

## 2025-01-14 RX ORDER — LEVOTHYROXINE SODIUM 75 UG/1
TABLET ORAL
Qty: 90 TABLET | Refills: 1 | Status: SHIPPED | OUTPATIENT
Start: 2025-01-14

## 2025-01-14 NOTE — TELEPHONE ENCOUNTER
Refills have been requested for the following medications:         phentermine (ADIPEX-P) 37.5 MG tablet [Massimo Mays MD]     Preferred pharmacy: [email or web address] - [place], [place] - [address]    Last seen 11/13/24  Has appt 2/12/25     1 STEPHEN CHAN 1965 F 1994 [address]      Search Criteria  Name Date of Birth Date Range  Stephen Chan 1965 01- To 01-  Requester Name Requested Date  MASSIMO MAYS 01- 13:57:30 ([facility])  Summary  Prescriptions  5  Prescribers  1  Pharmacies  1  Drug Classes  Benzodiazepines  0  Stimulants  5  Opioids  0  Muscle Relaxants  0  Opioid Dosage  Total MME for Active Prescriptions  0    Average MME  0.00         Prescriptions  Notifications    Prescribers  Pharmacies  MME Graph    Show All     [place]   Drug Categories:      Stimulants     Show  10  entries  Search:  Patient Id Prescription # Filled Written Drug Label Qty Days Strength MME** Prescriber Pharmacy Payment REFILL #/Auth State Detail  1 [id number] 11/13/2024 11/13/2024 Phentermine Hcl (Tablet) 30.0 30 37.5 MG NA MIMI) [facility] [place] 0 / 0 [place]   1 [id number] 08/15/2024 08/14/2024 Phentermine Hcl (Tablet) 30.0 30 37.5 MG NA MIMI) [facility] [place] 0 / 0 [place]   1 [id number] 07/16/2024 07/16/2024 Phentermine Hcl (Tablet) 30.0 30 37.5 MG NA MIMI) [facility] [place] 0 / 0 [place]   1 [id number] 04/02/2024 04/02/2024 Phentermine Hcl (Tablet) 30.0 30 37.5 MG NA MIMI) [facility] [place] 0 / 0 [place]   1 [id number] 01/27/2024 01/27/2024 Phentermine Hcl (Tablet) 30.0 30 37.5 MG NA MIMI) [facility]

## 2025-01-15 ENCOUNTER — HOSPITAL ENCOUNTER (OUTPATIENT)
Dept: BONE DENSITY | Facility: HOSPITAL | Age: 60
Discharge: HOME | End: 2025-01-15
Payer: COMMERCIAL

## 2025-01-15 VITALS — HEIGHT: 64 IN | BODY MASS INDEX: 24.75 KG/M2 | WEIGHT: 145 LBS

## 2025-01-15 PROCEDURE — 77080 DXA BONE DENSITY AXIAL: CPT

## 2025-01-17 ENCOUNTER — HOSPITAL ENCOUNTER (OUTPATIENT)
Dept: MAMMOGRAPHY | Facility: HOSPITAL | Age: 60
End: 2025-01-17
Payer: COMMERCIAL

## 2025-01-17 ENCOUNTER — RESULTS FOLLOW-UP (OUTPATIENT)
Dept: ENDOCRINOLOGY | Facility: CLINIC | Age: 60
End: 2025-01-17

## 2025-01-17 ENCOUNTER — PATIENT MESSAGE (OUTPATIENT)
Dept: FAMILY MEDICINE CLINIC | Facility: CLINIC | Age: 60
End: 2025-01-17

## 2025-01-17 DIAGNOSIS — Z12.31 ENCOUNTER FOR SCREENING MAMMOGRAM FOR BREAST CANCER: ICD-10-CM

## 2025-01-17 PROCEDURE — 77063 BREAST TOMOSYNTHESIS BI: CPT

## 2025-01-17 PROCEDURE — 77067 SCR MAMMO BI INCL CAD: CPT

## 2025-01-17 NOTE — TELEPHONE ENCOUNTER
Patient returning call and made aware:    Earline Metz MD to [facility] Clinical Regarding result: DXA bone density spine hip and pelvis       1/17/25 11:14 AM  Result Note  Covering Dr. Merion. DEXA scan showed evidence of osteoporosis and worsening of bone density., reviewing her chart shows that she is not currently on treatment for osteoporosis, I will leave the result in his in basket to discuss treatment option in his back. She will need to continue vitamin D and calcium supplementation.    Patient verbalized understanding.

## 2025-01-23 ENCOUNTER — RESULTS FOLLOW-UP (OUTPATIENT)
Dept: FAMILY MEDICINE CLINIC | Facility: CLINIC | Age: 60
End: 2025-01-23

## 2025-01-23 NOTE — TELEPHONE ENCOUNTER
----- Message from Wendy Osorio PA-C sent at 1/23/2025  1:59 PM EST -----  Benign mammogram. Recommend repeat screening mammogram in 1 year.

## 2025-01-28 ENCOUNTER — TELEPHONE (OUTPATIENT)
Dept: FAMILY MEDICINE CLINIC | Facility: CLINIC | Age: 60
End: 2025-01-28

## 2025-01-28 ENCOUNTER — OFFICE VISIT (OUTPATIENT)
Dept: RHEUMATOLOGY | Facility: CLINIC | Age: 60
End: 2025-01-28
Payer: COMMERCIAL

## 2025-01-28 VITALS
HEART RATE: 74 BPM | DIASTOLIC BLOOD PRESSURE: 72 MMHG | HEIGHT: 64 IN | BODY MASS INDEX: 24.75 KG/M2 | OXYGEN SATURATION: 97 % | SYSTOLIC BLOOD PRESSURE: 122 MMHG | WEIGHT: 145 LBS

## 2025-01-28 DIAGNOSIS — M15.0 PRIMARY GENERALIZED (OSTEO)ARTHRITIS: Primary | ICD-10-CM

## 2025-01-28 PROCEDURE — 99215 OFFICE O/P EST HI 40 MIN: CPT | Performed by: INTERNAL MEDICINE
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
## 2025-02-06 NOTE — TELEPHONE ENCOUNTER
Pt called today and is asking if she is still able to get the OZEMPIC SAMPLES? She is out and hasn't had the medicine for about 3 days.     Please advise and follow up as necessary.

## 2025-02-27 DIAGNOSIS — F41.9 ANXIETY: ICD-10-CM

## 2025-02-28 RX ORDER — ESCITALOPRAM OXALATE 10 MG/1
TABLET ORAL
Qty: 90 TABLET | Refills: 1 | Status: SHIPPED | OUTPATIENT
Start: 2025-02-28

## 2025-03-03 ENCOUNTER — OFFICE VISIT (OUTPATIENT)
Dept: FAMILY MEDICINE CLINIC | Facility: CLINIC | Age: 60
End: 2025-03-03
Payer: COMMERCIAL

## 2025-03-03 VITALS
RESPIRATION RATE: 16 BRPM | OXYGEN SATURATION: 98 % | DIASTOLIC BLOOD PRESSURE: 74 MMHG | HEART RATE: 80 BPM | SYSTOLIC BLOOD PRESSURE: 124 MMHG | WEIGHT: 147.2 LBS | HEIGHT: 64 IN | BODY MASS INDEX: 25.13 KG/M2 | TEMPERATURE: 97.6 F

## 2025-03-03 DIAGNOSIS — J20.8 ACUTE BRONCHITIS DUE TO OTHER SPECIFIED ORGANISMS: ICD-10-CM

## 2025-03-03 DIAGNOSIS — Z00.01 ABNORMAL WELLNESS EXAM: ICD-10-CM

## 2025-03-03 DIAGNOSIS — J01.00 ACUTE NON-RECURRENT MAXILLARY SINUSITIS: Primary | ICD-10-CM

## 2025-03-03 PROCEDURE — 99213 OFFICE O/P EST LOW 20 MIN: CPT | Performed by: FAMILY MEDICINE

## 2025-03-03 PROCEDURE — 99396 PREV VISIT EST AGE 40-64: CPT | Performed by: FAMILY MEDICINE

## 2025-03-03 RX ORDER — AZITHROMYCIN 250 MG/1
TABLET, FILM COATED ORAL
Qty: 6 TABLET | Refills: 0 | Status: SHIPPED | OUTPATIENT
Start: 2025-03-03 | End: 2025-03-07

## 2025-03-03 RX ORDER — PREDNISONE 50 MG/1
50 TABLET ORAL DAILY
Qty: 5 TABLET | Refills: 0 | Status: SHIPPED | OUTPATIENT
Start: 2025-03-03 | End: 2025-03-08

## 2025-03-05 RX ORDER — ALBUTEROL SULFATE 90 UG/1
2 INHALANT RESPIRATORY (INHALATION) EVERY 4 HOURS PRN
Qty: 6.7 G | Refills: 0 | Status: SHIPPED | OUTPATIENT
Start: 2025-03-05

## 2025-03-11 PROBLEM — J01.00 ACUTE NON-RECURRENT MAXILLARY SINUSITIS: Status: ACTIVE | Noted: 2022-07-12

## 2025-03-11 NOTE — PROGRESS NOTES
Name: Dalia Loja      : 1965      MRN: [id number]  Encounter Provider: Henry Mays MD  Encounter Date: 3/3/2025   Encounter department: [facility]    Assessment & Plan  Acute non-recurrent maxillary sinusitis  She was given prescriptions for Z-Lizandro and prednisone.  It was discussed about increase oral hydration.  Orders:  •  azithromycin (ZITHROMAX) 250 mg tablet; Take 2 tablets today then 1 tablet daily x 4 days  •  predniSONE 50 mg tablet; Take 1 tablet (50 mg total) by mouth daily for 5 days    Abnormal wellness exam  It was discussed about immunizations, diet, exercise and safety measures.            History of Present Illness     She is here today for a physical and with complaint of upper respiratory symptoms including nasal congestion, present open sinus pressure.    Cough  Associated symptoms include postnasal drip and rhinorrhea. Pertinent negatives include no chills, fever or rash.     Review of Systems   Constitutional:  Negative for activity change, chills and fever.   HENT:  Positive for congestion, postnasal drip, rhinorrhea and sinus pressure.    Respiratory:  Positive for cough. Negative for apnea.    Gastrointestinal:  Negative for diarrhea and vomiting.   Skin:  Negative for rash.   Neurological:  Negative for dizziness.     Past Medical History:   Diagnosis Date   • Anemia     Post gastric bypass surgery   • Asthma     Allergy induced   • Disease of thyroid gland      Past Surgical History:   Procedure Laterality Date   • CHOLECYSTECTOMY      When I had weight loss surgery   • HERNIA REPAIR      Belly Button   • REDUCTION MAMMAPLASTY Bilateral      Family History   Problem Relation Age of Onset   • Cancer Mother    • Hypertension Father         Has a pacemaker as well.   • Asthma Sister    • No Known Problems Daughter    • No Known Problems Maternal Grandmother    • No Known Problems Paternal Grandmother    • No Known Problems Paternal Aunt   "    Social History     Tobacco Use   • Smoking status: Never   • Smokeless tobacco: Never   Vaping Use   • Vaping status: Never Used   Substance and Sexual Activity   • Alcohol use: Yes     Comment: occasional   • Drug use: Not Currently   • Sexual activity: Yes     Partners: Male     Birth control/protection: Post-menopausal     Current Outpatient Medications on File Prior to Visit   Medication Sig   • escitalopram (LEXAPRO) 10 mg tablet TAKE ONE TABLET (10 MG TOTAL) BY MOUTH DAILY   • levothyroxine 75 mcg tablet Take one tablet daily on empty stomach 1 hour before breakfast and 4 hours apart from calcium and vitamin D supplementation   • Multiple Vitamins-Minerals (VITAMINS/MINERALS PO) Take by mouth daily   • phentermine (ADIPEX-P) 37.5 MG tablet Take 1 tablet (37.5 mg total) by mouth in the morning   • PreviDent 5000 Booster Plus 1.1 % PSTE    • rosuvastatin (CRESTOR) 10 MG tablet Take 1 tablet (10 mg total) by mouth daily   • Budeson-Glycopyrrol-Formoterol (Breztri Aerosphere) 160-9-4.8 MCG/ACT AERO Inhale 2 puffs daily Rinse mouth after use. (Patient not taking: Reported on 1/28/2025)   • gabapentin (Neurontin) 100 mg capsule Take 1 capsule (100 mg total) by mouth 3 (three) times a day (Patient not taking: Reported on 1/28/2025)   • [DISCONTINUED] budesonide (Pulmicort Flexhaler) 90 MCG/ACT inhaler Inhale 1 puff 2 (two) times a day Rinse mouth after use. (Patient not taking: Reported on 10/10/2022)     Allergies   Allergen Reactions   • Sulfa Antibiotics Hives and Rash   • Other Other (See Comments)     Seasonal allergies, Cats.        There is no immunization history on file for this patient.  Objective   /74 (BP Location: Left arm, Patient Position: Sitting, Cuff Size: Standard)   Pulse 80   Temp 97.6 °F (36.4 °C) (Tympanic)   Resp 16   Ht 5' 4\" (1.626 m)   Wt 66.8 kg (147 lb 3.2 oz)   SpO2 98%   BMI 25.27 kg/m²     Physical Exam  Vitals and nursing note reviewed.   Constitutional:       " Appearance: She is well-developed.   HENT:      Head: Normocephalic and atraumatic.      Nose: Congestion present.      Mouth/Throat:      Pharynx: Posterior oropharyngeal erythema present.   Eyes:      Pupils: Pupils are equal, round, and reactive to light.   Cardiovascular:      Rate and Rhythm: Normal rate and regular rhythm.      Heart sounds: Normal heart sounds.   Pulmonary:      Effort: Pulmonary effort is normal.      Breath sounds: Normal breath sounds.   Abdominal:      General: Bowel sounds are normal.      Palpations: Abdomen is soft.   Musculoskeletal:      Cervical back: Normal range of motion and neck supple.   Lymphadenopathy:      Cervical: No cervical adenopathy.   Skin:     General: Skin is warm.   Neurological:      Mental Status: She is alert and oriented to person, place, and time.

## 2025-03-11 NOTE — ASSESSMENT & PLAN NOTE
She was given prescriptions for Z-Lizandro and prednisone.  It was discussed about increase oral hydration.  Orders:  •  azithromycin (ZITHROMAX) 250 mg tablet; Take 2 tablets today then 1 tablet daily x 4 days  •  predniSONE 50 mg tablet; Take 1 tablet (50 mg total) by mouth daily for 5 days

## 2025-03-30 DIAGNOSIS — E03.9 HYPOTHYROIDISM, UNSPECIFIED TYPE: ICD-10-CM

## 2025-03-30 DIAGNOSIS — J20.8 ACUTE BRONCHITIS DUE TO OTHER SPECIFIED ORGANISMS: ICD-10-CM

## 2025-03-30 RX ORDER — ALBUTEROL SULFATE 90 UG/1
2 INHALANT RESPIRATORY (INHALATION) EVERY 4 HOURS PRN
Qty: 6.7 G | Refills: 0 | Status: SHIPPED | OUTPATIENT
Start: 2025-03-30

## 2025-03-31 DIAGNOSIS — E03.9 HYPOTHYROIDISM, UNSPECIFIED TYPE: ICD-10-CM

## 2025-03-31 RX ORDER — LEVOTHYROXINE SODIUM 75 UG/1
TABLET ORAL
Qty: 90 TABLET | Refills: 1 | Status: SHIPPED | OUTPATIENT
Start: 2025-03-31

## 2025-03-31 RX ORDER — PHENTERMINE HYDROCHLORIDE 37.5 MG/1
37.5 TABLET ORAL DAILY
Qty: 30 TABLET | Refills: 0 | Status: SHIPPED | OUTPATIENT
Start: 2025-03-31

## 2025-03-31 RX ORDER — LEVOTHYROXINE SODIUM 75 UG/1
TABLET ORAL
Qty: 90 TABLET | Refills: 0 | OUTPATIENT
Start: 2025-03-31

## 2025-03-31 NOTE — TELEPHONE ENCOUNTER
Refills have been requested for the following medications:         phentermine (ADIPEX-P) 37.5 MG tablet [Massimo Mays MD]         albuterol (PROVENTIL HFA,VENTOLIN HFA) 90 mcg/act inhaler [Massimo Mays MD]     Preferred pharmacy: [facility] - [place], [place] - [address]    Last seen 3/3/25  Has appt 6/10/25     1 STEPHEN LOJA 1965 F [address] [place], [place] [id number] [facility]      Search Criteria  Name Date of Birth Date Range  Stephen Loja 1965 03- To 03-  Requester Name Requested Date  MASSIMO MAYS 03- 11:29:14 ([facility])  Summary  Prescriptions  5  Prescribers  1  Pharmacies  2  Drug Classes  Benzodiazepines  0  Stimulants  5  Opioids  0  Muscle Relaxants  0  Opioid Dosage  Total MME for Active Prescriptions  0    Average MME  0.00         Prescriptions  Notifications    Prescribers  Pharmacies  MME Graph    Show All     [place]   Drug Categories:      Stimulants     Show  10  entries  Search:  Patient Id Prescription # Filled Written Drug Label Qty Days Strength MME** Prescriber Pharmacy Payment REFILL #/Auth State Detail  7 [id number] 01/20/2025 01/14/2025 Phentermine Hcl (Tablet) 30.0 30 37.5 MG NA MIMI) [facility] Commercial Insurance 0 / 0 [place]   1 [id number] 11/13/2024 11/13/2024 Phentermine Hcl (Tablet) 30.0 30 37.5 MG NA MIMI) [facility] Commercial Insurance 0 / 0 [place]   1 [id number] 08/15/2024 08/14/2024 Phentermine Hcl (Tablet) 30.0 30 37.5 MG NA MIMI) [facility] Commercial Insurance 0 / 0 [place]   1 [id number] 07/16/2024 07/16/2024 Phentermine Hcl (Tablet) 30.0 30 37.5 MG NA MIMI) [facility] Commercial Insurance 0 / 0 [place]   1 [id number] 04/02/2024 04/02/2024 Phentermine Hcl (Tablet) 30.0 30 37.5 MG NA WASSIM(MD) [facility] C

## 2025-04-03 ENCOUNTER — TELEPHONE (OUTPATIENT)
Dept: FAMILY MEDICINE CLINIC | Facility: CLINIC | Age: 60
End: 2025-04-03

## 2025-04-03 NOTE — TELEPHONE ENCOUNTER
Patient called the RX Refill Line. Message is being forwarded to the office.     Patient is requesting samples of ozempic. She is due for her shot tomorrow.    Please contact patient at [phone]

## 2025-04-04 NOTE — TELEPHONE ENCOUNTER
Pt called back to inquire until what time office is open to  ozempic samples. Spoke with Mei in the practice, advised pt she can come in before 4:30

## 2025-04-10 PROBLEM — J01.00 ACUTE NON-RECURRENT MAXILLARY SINUSITIS: Status: RESOLVED | Noted: 2022-07-12 | Resolved: 2025-04-10

## 2025-05-06 ENCOUNTER — TELEPHONE (OUTPATIENT)
Dept: FAMILY MEDICINE CLINIC | Facility: CLINIC | Age: 60
End: 2025-05-06

## 2025-05-06 NOTE — TELEPHONE ENCOUNTER
Patient called the RX Refill Line. Message is being forwarded to the office.     Patient is requesting ozempic 0.5 mg sample. If someone can call her, she is due for the injection on Friday.    Please contact patient at [phone]

## 2025-05-15 DIAGNOSIS — J20.8 ACUTE BRONCHITIS DUE TO OTHER SPECIFIED ORGANISMS: ICD-10-CM

## 2025-05-15 DIAGNOSIS — E78.49 OTHER HYPERLIPIDEMIA: ICD-10-CM

## 2025-05-16 DIAGNOSIS — J20.8 ACUTE BRONCHITIS DUE TO OTHER SPECIFIED ORGANISMS: ICD-10-CM

## 2025-05-16 RX ORDER — ROSUVASTATIN CALCIUM 10 MG/1
10 TABLET, COATED ORAL DAILY
Qty: 30 TABLET | Refills: 5 | Status: SHIPPED | OUTPATIENT
Start: 2025-05-16

## 2025-05-16 RX ORDER — ALBUTEROL SULFATE 90 UG/1
2 INHALANT RESPIRATORY (INHALATION) EVERY 4 HOURS PRN
Qty: 6.7 G | Refills: 0 | Status: SHIPPED | OUTPATIENT
Start: 2025-05-16

## 2025-05-16 NOTE — TELEPHONE ENCOUNTER
Refills have been requested for the following medications:         rosuvastatin (CRESTOR) 10 MG tablet [Henry Mays MD]         albuterol (PROVENTIL HFA,VENTOLIN HFA) 90 mcg/act inhaler [Henry Mays MD]     Preferred pharmacy: [facility] - [place], [place] - [address]    Last seen 3/3/25

## 2025-05-19 RX ORDER — ALBUTEROL SULFATE 90 UG/1
INHALANT RESPIRATORY (INHALATION)
Qty: 6.7 G | Refills: 0 | OUTPATIENT
Start: 2025-05-19

## 2025-06-10 ENCOUNTER — OFFICE VISIT (OUTPATIENT)
Dept: FAMILY MEDICINE CLINIC | Facility: CLINIC | Age: 60
End: 2025-06-10
Payer: COMMERCIAL

## 2025-06-10 VITALS
OXYGEN SATURATION: 99 % | WEIGHT: 150 LBS | RESPIRATION RATE: 16 BRPM | SYSTOLIC BLOOD PRESSURE: 110 MMHG | BODY MASS INDEX: 25.61 KG/M2 | TEMPERATURE: 95.9 F | HEART RATE: 76 BPM | DIASTOLIC BLOOD PRESSURE: 78 MMHG | HEIGHT: 64 IN

## 2025-06-10 DIAGNOSIS — R73.9 HYPERGLYCEMIA: ICD-10-CM

## 2025-06-10 DIAGNOSIS — F41.9 ANXIETY: ICD-10-CM

## 2025-06-10 DIAGNOSIS — E03.8 OTHER SPECIFIED HYPOTHYROIDISM: ICD-10-CM

## 2025-06-10 DIAGNOSIS — E03.9 HYPOTHYROIDISM, UNSPECIFIED TYPE: ICD-10-CM

## 2025-06-10 DIAGNOSIS — E55.9 VITAMIN D INSUFFICIENCY: ICD-10-CM

## 2025-06-10 DIAGNOSIS — E78.49 OTHER HYPERLIPIDEMIA: Primary | ICD-10-CM

## 2025-06-10 PROCEDURE — 99214 OFFICE O/P EST MOD 30 MIN: CPT | Performed by: FAMILY MEDICINE

## 2025-06-10 RX ORDER — PHENTERMINE HYDROCHLORIDE 37.5 MG/1
37.5 TABLET ORAL DAILY
Qty: 30 TABLET | Refills: 0 | Status: SHIPPED | OUTPATIENT
Start: 2025-06-10

## 2025-06-10 RX ORDER — HYDROCORTISONE 25 MG/G
CREAM TOPICAL AS NEEDED
COMMUNITY
Start: 2025-03-28

## 2025-06-10 RX ORDER — ESCITALOPRAM OXALATE 10 MG/1
10 TABLET ORAL DAILY
Qty: 90 TABLET | Refills: 1 | Status: SHIPPED | OUTPATIENT
Start: 2025-06-10

## 2025-06-10 NOTE — PROGRESS NOTES
Name: Dalia Loja      : 1965      MRN: [id number]  Encounter Provider: Henry Mays MD  Encounter Date: 6/10/2025   Encounter department:  [facility]  :  Assessment & Plan  Other hyperlipidemia  Stable on Crestor 10 mg daily.  Continue same.  Will continue to monitor fasting lipid profile.  Orders:  •  CBC and differential; Future  •  Comprehensive metabolic panel; Future  •  Lipid Panel with Direct LDL reflex; Future  •  TSH, 3rd generation with Free T4 reflex; Future    Hyperglycemia  It was discussed about low-carb diet and regular exercise.  Continue to monitor fasting glucose and A1c.    Orders:  •  Hemoglobin A1C; Future    Other specified hypothyroidism  Stable.  Continue on levothyroxine 75 mcg daily.  Will continue to monitor.  She is  due for blood work.    Orders:  •  TSH, 3rd generation with Free T4 reflex; Future    Vitamin D insufficiency  Continue on vitamin D.  Will continue to monitor.       Anxiety  Well controlled with lexapro 10 mg. Will continue to monitor.               History of Present Illness   She is here today for follow-up multiple medical problems.  She has been taking her medications.  She denies any side effect her medications.  She continue Ozempic 0.5 mg weekly to help with her weight loss.  She is due for blood work.      Review of Systems   Constitutional:  Negative for chills and fever.   HENT:  Negative for trouble swallowing.    Eyes:  Negative for visual disturbance.   Respiratory:  Negative for cough and shortness of breath.    Cardiovascular:  Negative for chest pain, palpitations and leg swelling.   Gastrointestinal:  Negative for abdominal pain, constipation and diarrhea.   Endocrine: Negative for cold intolerance and heat intolerance.   Genitourinary:  Negative for difficulty urinating and dysuria.   Musculoskeletal:  Negative for gait problem.   Skin:  Negative for rash.   Neurological:  Negative for dizziness, tremors,  "seizures and headaches.   Hematological:  Negative for adenopathy.   Psychiatric/Behavioral:  Negative for behavioral problems.        Objective   /78 (BP Location: Left arm, Patient Position: Sitting, Cuff Size: Standard)   Pulse 76   Temp (!) 95.9 °F (35.5 °C) (Tympanic)   Resp 16   Ht 5' 4\" (1.626 m)   Wt 68 kg (150 lb)   SpO2 99%   BMI 25.75 kg/m²      Physical Exam  Vitals and nursing note reviewed.   Constitutional:       Appearance: She is well-developed.   HENT:      Head: Normocephalic and atraumatic.     Eyes:      Pupils: Pupils are equal, round, and reactive to light.       Cardiovascular:      Rate and Rhythm: Normal rate and regular rhythm.      Heart sounds: Normal heart sounds.   Pulmonary:      Effort: Pulmonary effort is normal.      Breath sounds: Normal breath sounds.   Abdominal:      General: Bowel sounds are normal.      Palpations: Abdomen is soft.     Musculoskeletal:      Cervical back: Normal range of motion and neck supple.   Lymphadenopathy:      Cervical: No cervical adenopathy.     Skin:     General: Skin is warm.     Neurological:      Mental Status: She is alert and oriented to person, place, and time.         "

## 2025-06-10 NOTE — ASSESSMENT & PLAN NOTE
Stable on Crestor 10 mg daily.  Continue same.  Will continue to monitor fasting lipid profile.  Orders:  •  CBC and differential; Future  •  Comprehensive metabolic panel; Future  •  Lipid Panel with Direct LDL reflex; Future  •  TSH, 3rd generation with Free T4 reflex; Future

## 2025-06-10 NOTE — ASSESSMENT & PLAN NOTE
Stable.  Continue on levothyroxine 75 mcg daily.  Will continue to monitor.  She is  due for blood work.    Orders:  •  TSH, 3rd generation with Free T4 reflex; Future

## 2025-06-10 NOTE — ASSESSMENT & PLAN NOTE
It was discussed about low-carb diet and regular exercise.  Continue to monitor fasting glucose and A1c.    Orders:  •  Hemoglobin A1C; Future

## 2025-06-11 RX ORDER — LEVOTHYROXINE SODIUM 75 UG/1
TABLET ORAL
Qty: 90 TABLET | Refills: 1 | Status: SHIPPED | OUTPATIENT
Start: 2025-06-11

## 2025-06-23 ENCOUNTER — TELEPHONE (OUTPATIENT)
Dept: FAMILY MEDICINE CLINIC | Facility: CLINIC | Age: 60
End: 2025-06-23

## 2025-07-21 ENCOUNTER — OFFICE VISIT (OUTPATIENT)
Dept: FAMILY MEDICINE CLINIC | Facility: CLINIC | Age: 60
End: 2025-07-21
Payer: COMMERCIAL

## 2025-07-21 VITALS
OXYGEN SATURATION: 98 % | TEMPERATURE: 97.6 F | SYSTOLIC BLOOD PRESSURE: 124 MMHG | RESPIRATION RATE: 16 BRPM | DIASTOLIC BLOOD PRESSURE: 80 MMHG | WEIGHT: 142.2 LBS | HEART RATE: 68 BPM | BODY MASS INDEX: 24.28 KG/M2 | HEIGHT: 64 IN

## 2025-07-21 DIAGNOSIS — L29.9 ITCHING: ICD-10-CM

## 2025-07-21 DIAGNOSIS — L25.5 DERMATITIS DUE TO PLANTS: Primary | ICD-10-CM

## 2025-07-21 PROCEDURE — 99213 OFFICE O/P EST LOW 20 MIN: CPT | Performed by: FAMILY MEDICINE

## 2025-07-21 RX ORDER — PREDNISONE 10 MG/1
TABLET ORAL
Qty: 32 TABLET | Refills: 0 | Status: SHIPPED | OUTPATIENT
Start: 2025-07-21

## 2025-07-21 RX ORDER — TRIAMCINOLONE ACETONIDE 5 MG/G
CREAM TOPICAL 3 TIMES DAILY
Qty: 45 G | Refills: 1 | Status: SHIPPED | OUTPATIENT
Start: 2025-07-21

## 2025-07-21 RX ORDER — HYDROXYZINE HYDROCHLORIDE 25 MG/1
25 TABLET, FILM COATED ORAL EVERY 6 HOURS PRN
Qty: 30 TABLET | Refills: 1 | Status: SHIPPED | OUTPATIENT
Start: 2025-07-21

## 2025-07-21 NOTE — ASSESSMENT & PLAN NOTE
She was given prescriptions for prednisone, triamcinolone and Atarax.  Discussed the possible side effect.  If not improving call or come back.  Orders:  •  predniSONE 10 mg tablet; Take 4 tabs daily for 3 days,then 3 tabs daily for 3 days, then 2 tabs daily for 3 days, then 1 tab daily for 3 days, then 1/2 tab daily for 4 days and then stop.  •  triamcinolone (KENALOG) 0.5 % cream; Apply topically 3 (three) times a day  •  hydrOXYzine HCL (ATARAX) 25 mg tablet; Take 1 tablet (25 mg total) by mouth every 6 (six) hours as needed for itching

## 2025-07-21 NOTE — PROGRESS NOTES
Name: Dalia Loja      : 1965      MRN: [id number]  Encounter Provider: Henry Mays MD  Encounter Date: 2025   Encounter department: [facility]  :  Assessment & Plan  Dermatitis due to plants  She was given prescriptions for prednisone, triamcinolone and Atarax.  Discussed the possible side effect.  If not improving call or come back.  Orders:  •  predniSONE 10 mg tablet; Take 4 tabs daily for 3 days,then 3 tabs daily for 3 days, then 2 tabs daily for 3 days, then 1 tab daily for 3 days, then 1/2 tab daily for 4 days and then stop.  •  triamcinolone (KENALOG) 0.5 % cream; Apply topically 3 (three) times a day  •  hydrOXYzine HCL (ATARAX) 25 mg tablet; Take 1 tablet (25 mg total) by mouth every 6 (six) hours as needed for itching    Itching    Orders:  •  hydrOXYzine HCL (ATARAX) 25 mg tablet; Take 1 tablet (25 mg total) by mouth every 6 (six) hours as needed for itching           History of Present Illness   She is here today with complaint of rash on her upper extremity that started a week ago.  She stated since then the rash has been spreading in other places in her body and this morning started on her face.  She was in contact with plants more than a week ago.  She has no previous history of poison ivy.  She stated the rash are itchy.  Denies any fever or chills.    Rash  Pertinent negatives include no cough, diarrhea, fever or shortness of breath.     Review of Systems   Constitutional:  Negative for chills and fever.   HENT:  Negative for trouble swallowing.    Eyes:  Negative for visual disturbance.   Respiratory:  Negative for cough and shortness of breath.    Cardiovascular:  Negative for chest pain, palpitations and leg swelling.   Gastrointestinal:  Negative for abdominal pain, constipation and diarrhea.   Endocrine: Negative for cold intolerance and heat intolerance.   Genitourinary:  Negative for difficulty urinating and dysuria.   Musculoskeletal:   "Negative for gait problem.   Skin:  Positive for rash.   Neurological:  Negative for dizziness, tremors, seizures and headaches.   Hematological:  Negative for adenopathy.   Psychiatric/Behavioral:  Negative for behavioral problems.        Objective   /80 (BP Location: Left arm, Patient Position: Sitting, Cuff Size: Large)   Pulse 68   Temp 97.6 °F (36.4 °C) (Tympanic)   Resp 16   Ht 5' 4\" (1.626 m)   Wt 64.5 kg (142 lb 3.2 oz)   SpO2 98%   BMI 24.41 kg/m²      Physical Exam  Vitals and nursing note reviewed.   Constitutional:       Appearance: She is well-developed.   HENT:      Head: Normocephalic and atraumatic.     Eyes:      Pupils: Pupils are equal, round, and reactive to light.       Cardiovascular:      Rate and Rhythm: Normal rate and regular rhythm.      Heart sounds: Normal heart sounds.   Pulmonary:      Effort: Pulmonary effort is normal.      Breath sounds: Normal breath sounds.   Abdominal:      General: Bowel sounds are normal.      Palpations: Abdomen is soft.     Musculoskeletal:      Cervical back: Normal range of motion and neck supple.   Lymphadenopathy:      Cervical: No cervical adenopathy.     Skin:     General: Skin is warm.      Findings: Rash present.     Neurological:      Mental Status: She is alert and oriented to person, place, and time.         "

## 2025-07-24 DIAGNOSIS — F41.9 ANXIETY: ICD-10-CM

## 2025-07-24 DIAGNOSIS — L29.9 ITCHING: ICD-10-CM

## 2025-07-24 DIAGNOSIS — L25.5 DERMATITIS DUE TO PLANTS: ICD-10-CM

## 2025-07-25 RX ORDER — ESCITALOPRAM OXALATE 10 MG/1
10 TABLET ORAL DAILY
Qty: 90 TABLET | Refills: 0 | Status: SHIPPED | OUTPATIENT
Start: 2025-07-25

## 2025-07-25 RX ORDER — HYDROXYZINE HYDROCHLORIDE 25 MG/1
25 TABLET, FILM COATED ORAL EVERY 6 HOURS PRN
Qty: 30 TABLET | Refills: 1 | OUTPATIENT
Start: 2025-07-25

## 2025-07-31 DIAGNOSIS — L29.9 ITCHING: ICD-10-CM

## 2025-07-31 DIAGNOSIS — L25.5 DERMATITIS DUE TO PLANTS: ICD-10-CM

## 2025-08-01 RX ORDER — HYDROXYZINE HYDROCHLORIDE 25 MG/1
25 TABLET, FILM COATED ORAL EVERY 6 HOURS PRN
Qty: 30 TABLET | Refills: 1 | Status: SHIPPED | OUTPATIENT
Start: 2025-08-01